# Patient Record
Sex: FEMALE | Race: WHITE | NOT HISPANIC OR LATINO | Employment: FULL TIME | URBAN - METROPOLITAN AREA
[De-identification: names, ages, dates, MRNs, and addresses within clinical notes are randomized per-mention and may not be internally consistent; named-entity substitution may affect disease eponyms.]

---

## 2017-04-10 ENCOUNTER — ALLSCRIPTS OFFICE VISIT (OUTPATIENT)
Dept: OTHER | Facility: OTHER | Age: 27
End: 2017-04-10

## 2017-09-08 ENCOUNTER — GENERIC CONVERSION - ENCOUNTER (OUTPATIENT)
Dept: OTHER | Facility: OTHER | Age: 27
End: 2017-09-08

## 2017-10-12 ENCOUNTER — ALLSCRIPTS OFFICE VISIT (OUTPATIENT)
Dept: OTHER | Facility: OTHER | Age: 27
End: 2017-10-12

## 2017-10-24 ENCOUNTER — GENERIC CONVERSION - ENCOUNTER (OUTPATIENT)
Dept: OTHER | Facility: OTHER | Age: 27
End: 2017-10-24

## 2017-10-24 ENCOUNTER — APPOINTMENT (OUTPATIENT)
Dept: RADIOLOGY | Facility: CLINIC | Age: 27
End: 2017-10-24
Payer: COMMERCIAL

## 2017-10-24 ENCOUNTER — TRANSCRIBE ORDERS (OUTPATIENT)
Dept: LAB | Facility: CLINIC | Age: 27
End: 2017-10-24

## 2017-10-24 DIAGNOSIS — S22.39XA CLOSED FRACTURE OF ONE RIB: ICD-10-CM

## 2017-10-24 PROCEDURE — 71100 X-RAY EXAM RIBS UNI 2 VIEWS: CPT

## 2017-10-29 ENCOUNTER — GENERIC CONVERSION - ENCOUNTER (OUTPATIENT)
Dept: OTHER | Facility: OTHER | Age: 27
End: 2017-10-29

## 2017-10-29 NOTE — PROGRESS NOTES
Assessment    1  Rib fractures (807 00) (S22 39XA)   2  Cough (786 2) (R05)    Plan  Rib fractures    · Betamethasone Sod Phos & Acet 6 (3-3) MG/ML Injection Suspension   · Ketorolac Tromethamine 60 MG/2ML Intramuscular Solution    Discussion/Summary    Will give toradol for rib pain  Will also give 6mg betamethasone to help stop cough and improve pain  RTO 1 month  Chief Complaint  Pt presents today for right rib pain that is not getting any beter  Pt still has cough also  History of Present Illness  HPI: Pt presents complaining of right rib pain  She suffered a fractured right 6th rib due to repeptitive coughing  She notes she was in the ER twice for the symptoms and was given tramadol, naproxen, dilaudid and tylenol #3 and desires much benefit with any of them  She continues with the coughing also  She denies fever, chills or night sweats  No hemoptysis  Review of Systems   Constitutional: as noted in HPI   ENT: no ear ache, no loss of hearing, no nosebleeds or nasal discharge, no sore throat or hoarseness  Cardiovascular: no complaints of slow or fast heart rate, no chest pain, no palpitations, no leg claudication or lower extremity edema  Respiratory: no complaints of shortness of breath, no wheezing, no dyspnea on exertion, no orthopnea or PND  Breasts: no complaints of breast pain, breast lump or nipple discharge  Gastrointestinal: no complaints of abdominal pain, no constipation, no nausea or diarrhea, no vomiting, no bloody stools  Genitourinary: no complaints of dysuria, no incontinence, no pelvic pain, no dysmenorrhea, no vaginal discharge or abnormal vaginal bleeding  Musculoskeletal: no complaints of arthralgia, no myalgia, no joint swelling or stiffness, no limb pain or swelling  Integumentary: no complaints of skin rash or lesion, no itching or dry skin, no skin wounds  Neurological: no complaints of headache, no confusion, no numbness or tingling, no dizziness or fainting  ROS reviewed  Active Problems  1  Acanthosis nigricans (701 2) (L83)   2  Anemia (285 9) (D64 9)   3  Atypical migraine (346 80) (G43 009)   4  Combined hyperlipidemia (272 2) (E78 2)   5  Cough (786 2) (R05)   6  Depression screening (V79 0) (Z13 89)   7  Elevated triglycerides with high cholesterol (272 2) (E78 2)   8  Exercise-induced asthma (493 81) (J45 990)   9  Headache (784 0) (R51)   10  Hypertension (401 9) (I10)   11  Irregular menstrual cycle (626 4) (N92 6)   12  Migraine variant, intractable (346 21) (G43 819)   13  Neuropathy (355 9) (G62 9)   14  Non-toxic multinodular goiter (241 1) (E04 2)   15  Obesity (278 00) (E66 9)   16  Obstructive sleep apnea (327 23) (G47 33)   17  Oral contraceptive prescribed (V25 01) (Z30 011)   18  Vitamin D deficiency (268 9) (E55 9)    Past Medical History  Active Problems And Past Medical History Reviewed: The active problems and past medical history were reviewed and updated today  Surgical History  Surgical History Reviewed: The surgical history was reviewed and updated today  Social History     · Being A Social Drinker   · Marital History - Single   · Never A Smoker  The social history was reviewed and updated today  The social history was reviewed and is unchanged  Family History  Family History Reviewed: The family history was reviewed and updated today  Current Meds   1  Atorvastatin Calcium 20 MG Oral Tablet; TAKE 1 TABLET DAILY; Therapy: 35Ndl2693 to (Lory Sierra)  Requested for: 06Mjz2985; Last Rx:53Sce6880 Ordered   2  Butalbital-ASA-Caff-Codeine -45-30 MG Oral Capsule; Take 2 by mouth twice a day; Therapy: 18OJW9345 to (Last Rx:11Oct2017) Ordered   3  Fenofibrate Micronized 134 MG Oral Capsule; TAKE 1 CAPSULE DAILY   ; Therapy: 63SGK3224 to (Last Rx:11Oct2017)  Requested for: 49RCC4467 Ordered   4  Ferrous Sulfate 325 (65 Fe) MG Oral Tablet; TAKE 1 TABLET DAILY AS DIRECTED;  Therapy: 94KIV1423 to (Evaluate:89Sbj4372) Recorded   5  NuvaRing 0 12-0 015 MG/24HR Vaginal Ring; INSERT 1 RING INTRAVAGINALLY FOR 3 WEEKS THEN REMOVE FOR 1 WEEK EACH MONTH ; Therapy: 33VFI9961 to (Last Rx:24Oct2016)  Requested for: 24Oct2016 Ordered   6  Propranolol HCl - 10 MG Oral Tablet; TAKE (1) TABLET THREE TIMES DAILY; Therapy: 58IUP5041 to (Last Rx:78Ndn4204)  Requested for: 55Axi1209 Ordered    The medication list was reviewed and updated today  Allergies  1  Penicillins   2  Bactrim DS TABS    Vitals   Recorded: 24UZO0846 01:24PM   Temperature 97 5 F   Heart Rate 110   Respiration 20   Systolic 526   Diastolic 82   Height 5 ft 4 in   Weight 260 lb    BMI Calculated 44 63   BSA Calculated 2 19   O2 Saturation 98       Physical Exam   Constitutional  General appearance: No acute distress, well appearing and well nourished  Pulmonary  Respiratory effort: No increased work of breathing or signs of respiratory distress  Auscultation of lungs: Clear to auscultation  Cardiovascular  Palpation of heart: Normal PMI, no thrills  Auscultation of heart: Normal rate and rhythm, normal S1 and S2, without murmurs  Examination of extremities for edema and/or varicosities: Normal    Musculoskeletal  Gait and station: Normal    Digits and nails: Normal without clubbing or cyanosis  Inspection/palpation of joints, bones, and muscles: Normal    Skin  Skin and subcutaneous tissue: Normal without rashes or lesions     Psychiatric  Orientation to person, place, and time: Normal    Mood and affect: Normal          Signatures   Electronically signed by : Roddy Moritz, PAC; Oct 13 2017  3:36PM EST                       (Author)    Electronically signed by : GINA Singleton ; Oct 13 2017  4:00PM EST                       (Author)

## 2018-01-13 NOTE — RESULT NOTES
Verified Results  (1) CBC/ PLT (NO DIFF) [id number] 08:43AM Flower Mitchell Order Number: [id number]     Test Name Result Flag Reference   HEMATOCRIT 42 9 %  34 8-46 1   HEMOGLOBIN 14 0 g/dL  11 5-15 4   MCHC 32 6 g/dL  31 4-37 4   MCH 27 8 pg  26 8-34 3   MCV 85 fL  82-98   PLATELET COUNT 288 Thousands/uL  149-390   RBC COUNT 5 04 Million/uL  3 81-5 12   RDW 14 3 %  11 6-15 1   WBC COUNT 11 14 Thousand/uL H 4 31-10 16   MPV 11 8 fL  8 9-12 7     (1) COMPREHENSIVE METABOLIC PANEL 40AET1054 33:30ZB Flower Mitchell Order Number: [id number]     Test Name Result Flag Reference   GLUCOSE,RANDM 86 mg/dL     If the patient is fasting, the ADA then defines impaired fasting glucose as > 100 mg/dL and diabetes as > or equal to 123 mg/dL  SODIUM 137 mmol/L  136-145   POTASSIUM 3 8 mmol/L  3 5-5 3   CHLORIDE 105 mmol/L  100-108   CARBON DIOXIDE 22 mmol/L  21-32   ANION GAP (CALC) 10 mmol/L  4-13   BLOOD UREA NITROGEN 13 mg/dL  5-25   CREATININE 0 77 mg/dL  0 60-1 30   Standardized to IDMS reference method   CALCIUM 9 2 mg/dL  8 3-10 1   BILI, TOTAL 0 36 mg/dL  0 20-1 00   ALK PHOSPHATAS 60 U/L     ALT (SGPT) 29 U/L  12-78   AST(SGOT) 17 U/L  5-45   ALBUMIN 3 4 g/dL L 3 5-5 0   TOTAL PROTEIN 8 0 g/dL  6 4-8 2   eGFR Non-African American      >60 0 ml/min/1 73sq m   - Patient Instructions: This is a fasting blood test  Water,black tea or black  coffee only after 9:00pm the night before test Drink 2 glasses of water the morning of test - Patient Instructions: This bloodwork is non-fasting  Please drink two glasses of   water morning of bloodwork  National Kidney Disease Education Program recommendations are as follows:  GFR calculation is accurate only with a steady state creatinine  Chronic Kidney disease less than 60 ml/min/1 73 sq  meters  Kidney failure less than 15 ml/min/1 73 sq  meters       (1) LIPID PANEL, FASTING 75BIH8035 08:43AM Elsi Beckman   TW Order Number: [id number]     Test Name Result Flag Reference   CHOLESTEROL 275 mg/dL H    HDL,DIRECT 59 mg/dL  40-60   Specimen collection should occur prior to Metamizole administration due to the potential for falsely depressed results  LDL CHOLESTEROL CALCULATED 180 mg/dL H 0-100   - Patient Instructions: This is a fasting blood test  Water,black tea or black  coffee only after 9:00pm the night before test   Drink 2 glasses of water the morning of test     - Patient Instructions: This is a fasting blood test  Water,black tea or black  coffee only after 9:00pm the night before test Drink 2 glasses of water the morning of test - Patient Instructions: This bloodwork is non-fasting  Please drink two glasses of   water morning of bloodwork  Triglyceride:         Normal              <150 mg/dl       Borderline High    150-199 mg/dl       High               200-499 mg/dl       Very High          >499 mg/dl  Cholesterol:         Desirable        <200 mg/dl      Borderline High  200-239 mg/dl      High             >239 mg/dl  HDL Cholesterol:        High    >59 mg/dL      Low     <41 mg/dL  LDL CALCULATED:    This screening LDL is a calculated result  It does not have the accuracy of the Direct Measured LDL in the monitoring of patients with hyperlipidemia and/or statin therapy  Direct Measure LDL (GYG906) must be ordered separately in these patients  TRIGLYCERIDES 182 mg/dL H <=150   Specimen collection should occur prior to N-Acetylcysteine or Metamizole administration due to the potential for falsely depressed results  (1) TSH 71YSP2492 08:43AM Essie Patricio Order Number: [id number]     Test Name Result Flag Reference   TSH 1 780 uIU/mL  0 358-3 740   - Patient Instructions: This bloodwork is non-fasting  Please drink two glasses of water morning of bloodwork  - Patient Instructions:  This is a fasting blood test  Water,black tea or black  coffee only after 9:00pm the night before test Drink 2 glasses of water the morning of test - Patient Instructions: This bloodwork is non-fasting  Please drink two glasses of   water morning of bloodwork  Patients undergoing fluorescein dye angiography may retain small amounts of fluorescein in the body for 48-72 hours post procedure  Samples containing fluorescein can produce falsely depressed TSH values  If the patient had this procedure,a specimen should be resubmitted post fluorescein clearance  The recommended reference ranges for TSH during pregnancy are as follows:  First trimester 0 1 to 2 5 uIU/mL  Second trimester  0 2 to 3 0 uIU/mL  Third trimester 0 3 to 3 0 uIU/m     (1) VITAMIN D 25-HYDROXY 52Bxz5450 08:43AM Essie Patricio Order Number: [id number]     Test Name Result Flag Reference   VIT D 25-HYDROX 27 8 ng/mL L 30 0-100 0   This assay is a certified procedure of the CDC Vitamin D Standardization Certification Program (VDSCP)     Deficiency <20ng/ml   Insufficiency 20-30ng/ml   Sufficient  ng/ml     *Patients undergoing fluorescein dye angiography may retain small amounts of fluorescein in the body for 48-72 hours post procedure  Samples containing fluorescein can produce falsely elevated Vitamin D values  If the patient had this procedure, a specimen should be resubmitted post fluorescein clearance

## 2018-01-13 NOTE — RESULT NOTES
Verified Results  (1) FACTOR V LEIDEN MUTATION DNA ANALYSIS 44QJU6740 11:11AM Anayeli Reason Order Number: [id number]     Test Name Result Flag Reference   FACTOR V LEIDEN Comment     Result:  Negative (no mutation found)  Factor V Leiden is a specific mutation (R506Q) in the factor  V gene that is associated with an increased risk of venous  thrombosis  Factor V Leiden is more resistant to  inactivation by activated protein C  As a result, factor V  persists in the circulation leading to a mild hyper-  coagulable state  The Leiden mutation accounts for 90% -  95% of APC resistance  Factor V Leiden has been reported in  patients with deep vein thrombosis, pulmonary embolus,  central retinal vein occlusion, cerebral sinus thrombosis  and hepatic vein thrombosis  Other risk factors to be  considered in the workup for venous thrombosis include the  [id number] mutation in the factor II (prothrombin) gene,  protein S and C deficiency, and antithrombin deficiencies  Anticardiolipin antibody and lupus anticoagulant analysis  may be appropriate for certain patients, as well as  homocysteine levels  Contact your local [facility] for information on how to order  additional testing if desired  FACTOR V LEIDEN INTERPRETATION Comment     **Genetic counselors are available for health care providers to**    discuss results at [phone]  Methodology:  DNA analysis of the Factor V gene was performed by allele-specific  PCR  The diagnostic sensitivity and specificity is >99% for both  Molecular-based testing is highly accurate, but as in any laboratory  test, diagnostic errors may occur  All test results must be combined  with clinical information for the most accurate interpretation  References:  Dori MARTINEZ (1996)  Clin Lab Med 88:542-323    Gianfranco Horner, PhD, Umair Hill, PhD, Nonda Dandy, PhD, GINA Waterman , PhD, Janak Guzmán, PhD, Chelsea Paul, PhD, Sean Triana PhD, [facility], [facility]     Performed at:  [facility], [place]  [id number]  : Carrillo Hitchcock MD, Phone:  [phone]

## 2018-01-14 VITALS
HEART RATE: 76 BPM | SYSTOLIC BLOOD PRESSURE: 116 MMHG | HEIGHT: 64 IN | RESPIRATION RATE: 18 BRPM | BODY MASS INDEX: 44.9 KG/M2 | TEMPERATURE: 98.1 F | DIASTOLIC BLOOD PRESSURE: 84 MMHG | WEIGHT: 263 LBS

## 2018-01-14 VITALS
WEIGHT: 260 LBS | RESPIRATION RATE: 20 BRPM | HEART RATE: 110 BPM | TEMPERATURE: 97.5 F | HEIGHT: 64 IN | BODY MASS INDEX: 44.39 KG/M2 | DIASTOLIC BLOOD PRESSURE: 82 MMHG | SYSTOLIC BLOOD PRESSURE: 126 MMHG | OXYGEN SATURATION: 98 %

## 2018-01-15 NOTE — RESULT NOTES
Verified Results  (1) PT WITH INR 16Sep2016 11:11AM Cruz Dove Order Number: [id number]     Test Name Result Flag Reference   INR 0 98  0 86-1 16   PT 13 1 seconds  12 0-14 3     (1) CBC/PLT/DIFF 16Sep2016 11:11AM Elsi Beckman     Test Name Result Flag Reference   WBC COUNT 11 97 Thousand/uL H 4 31-10 16   RBC COUNT 4 77 Million/uL  3 81-5 12   HEMOGLOBIN 13 3 g/dL  11 5-15 4   HEMATOCRIT 41 1 %  34 8-46  1   MCV 86 fL  82-98   MCH 27 9 pg  26 8-34 3   MCHC 32 4 g/dL  31 4-37 4   RDW 14 6 %  11 6-15 1   MPV 11 5 fL  8 9-12 7   PLATELET COUNT 374 Thousands/uL  149-390   nRBC AUTOMATED 0 /100 WBCs     NEUTROPHILS RELATIVE PERCENT 74 %  43-75   LYMPHOCYTES RELATIVE PERCENT 19 %  14-44   MONOCYTES RELATIVE PERCENT 6 %  4-12   EOSINOPHILS RELATIVE PERCENT 1 %  0-6   BASOPHILS RELATIVE PERCENT 0 %  0-1   NEUTROPHILS ABSOLUTE COUNT 8 88 Thousands/?L H 1 85-7 62   LYMPHOCYTES ABSOLUTE COUNT 2 26 Thousands/?L  0 60-4 47   MONOCYTES ABSOLUTE COUNT 0 67 Thousand/?L  0 17-1 22   EOSINOPHILS ABSOLUTE COUNT 0 08 Thousand/?L  0 00-0 61   BASOPHILS ABSOLUTE COUNT 0 04 Thousands/?L  0 00-0 10   - Patient Instructions: This bloodwork is non-fasting  Please drink two glasses of water morning of bloodwork  - Patient Instructions: This bloodwork is non-fasting  Please drink two glasses of water morning of bloodwork       (1) PROTEIN C ACTIVITY 16Sep2016 11:11AM Cruz Dove Order Number: [id number]     Test Name Result Flag Reference   PROTEIN C >150 % of Normal H      (1) ANTITHROMBIN III ANTIGEN [id number] 11:11AM Cruz Dove Order Number: [id number]     Test Name Result Flag Reference   ANTITHROMBIN III ANTIGEN 81 %  75 - 130   Performed at:  [address]  [id number]  : Melvin Guerrero MD, Phone:  [phone]

## 2018-01-16 NOTE — RESULT NOTES
Verified Results  XR RIBS 2 VIEW RIGHT [id number] 10:29AM Jannet Marinelli Order Number: [id number]     Test Name Result Flag Reference   XR RIBS 2 VW RIGHT (Report)     RIGHT RIBS AND CHEST     INDICATION: Right lower anterior rib pain since September, started with heavy coughing  Hospital discovered cracked ribs  COMPARISON: None     VIEWS: Frontal chest and 5 views right hemithorax     VIEWS: 6     FINDINGS:     The cardiomediastinal silhouette is unremarkable  Lungs are clear  No pleural effusions  There is no pneumothorax  Probable nondisplaced fractures of the right lateral 6th, 7th and 8th ribs  IMPRESSION:     1  No active pulmonary disease  2  Probable nondisplaced fractures of the right lateral 6th, 7th and 8th ribs         Workstation performed: [id number]     Signed by:   Claire Reyna DO   10/26/17

## 2018-01-16 NOTE — RESULT NOTES
Verified Results  (1) INSULIN, FASTING 26Oct2016 08:51AM Avery Mccormick Order Number: [id number]     Test Name Result Flag Reference   INSULIN, FASTING 30 0 mU/L H 3 0-25 0

## 2018-01-22 VITALS
HEIGHT: 64 IN | RESPIRATION RATE: 20 BRPM | SYSTOLIC BLOOD PRESSURE: 122 MMHG | DIASTOLIC BLOOD PRESSURE: 78 MMHG | TEMPERATURE: 97.4 F | OXYGEN SATURATION: 97 % | HEART RATE: 100 BPM | WEIGHT: 260 LBS | BODY MASS INDEX: 44.39 KG/M2

## 2018-01-22 VITALS
HEART RATE: 98 BPM | WEIGHT: 265.5 LBS | OXYGEN SATURATION: 98 % | RESPIRATION RATE: 20 BRPM | HEIGHT: 64 IN | DIASTOLIC BLOOD PRESSURE: 84 MMHG | SYSTOLIC BLOOD PRESSURE: 134 MMHG | TEMPERATURE: 96.3 F | BODY MASS INDEX: 45.33 KG/M2

## 2018-05-09 ENCOUNTER — TRANSITIONAL CARE MANAGEMENT (OUTPATIENT)
Dept: FAMILY MEDICINE CLINIC | Facility: CLINIC | Age: 28
End: 2018-05-09

## 2018-05-10 RX ORDER — OXYCODONE HYDROCHLORIDE 5 MG/1
5 TABLET ORAL EVERY 4 HOURS
COMMUNITY
Start: 2018-05-05 | End: 2018-06-29 | Stop reason: ALTCHOICE

## 2018-05-10 RX ORDER — NITROFURANTOIN 25; 75 MG/1; MG/1
100 CAPSULE ORAL
COMMUNITY
Start: 2018-05-05 | End: 2018-05-10

## 2018-05-10 RX ORDER — MORPHINE SULFATE 15 MG/1
15 TABLET, FILM COATED, EXTENDED RELEASE ORAL
COMMUNITY
Start: 2018-05-05 | End: 2018-06-29 | Stop reason: ALTCHOICE

## 2018-05-10 RX ORDER — ACETAMINOPHEN 500 MG
1000 TABLET ORAL
COMMUNITY
Start: 2018-05-05 | End: 2019-03-18 | Stop reason: ALTCHOICE

## 2018-05-10 RX ORDER — IBUPROFEN 600 MG/1
600 TABLET ORAL
COMMUNITY
Start: 2018-05-05 | End: 2019-03-18 | Stop reason: ALTCHOICE

## 2018-05-10 RX ORDER — BACITRACIN ZINC AND POLYMYXIN B SULFATE 500; 1000 [USP'U]/G; [USP'U]/G
OINTMENT TOPICAL
COMMUNITY
Start: 2018-05-05 | End: 2018-09-24 | Stop reason: ALTCHOICE

## 2018-05-10 RX ORDER — METHOCARBAMOL 750 MG/1
750 TABLET, FILM COATED ORAL 3 TIMES DAILY
COMMUNITY
Start: 2018-05-05 | End: 2018-06-29 | Stop reason: ALTCHOICE

## 2018-05-11 ENCOUNTER — OFFICE VISIT (OUTPATIENT)
Dept: FAMILY MEDICINE CLINIC | Facility: CLINIC | Age: 28
End: 2018-05-11
Payer: COMMERCIAL

## 2018-05-11 VITALS
HEART RATE: 100 BPM | TEMPERATURE: 98.1 F | DIASTOLIC BLOOD PRESSURE: 70 MMHG | RESPIRATION RATE: 20 BRPM | SYSTOLIC BLOOD PRESSURE: 114 MMHG

## 2018-05-11 DIAGNOSIS — S22.49XD CLOSED FRACTURE OF MULTIPLE RIBS WITH ROUTINE HEALING, UNSPECIFIED LATERALITY, SUBSEQUENT ENCOUNTER: ICD-10-CM

## 2018-05-11 DIAGNOSIS — S82.141A TIBIAL PLATEAU FRACTURE, RIGHT, CLOSED, INITIAL ENCOUNTER: Primary | ICD-10-CM

## 2018-05-11 DIAGNOSIS — S32.009D CLOSED FRACTURE OF TRANSVERSE PROCESS OF LUMBAR VERTEBRA WITH ROUTINE HEALING, SUBSEQUENT ENCOUNTER: ICD-10-CM
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
## 2018-05-11 PROBLEM — S92.402A FRACTURE OF GREAT TOE, LEFT, CLOSED: Status: ACTIVE | Noted: 2018-04-29

## 2018-05-11 PROBLEM — S60.511A: Status: ACTIVE | Noted: 2018-04-29

## 2018-05-11 PROBLEM — S00.81XA FACIAL ABRASION, INITIAL ENCOUNTER: Status: ACTIVE | Noted: 2018-04-29

## 2018-05-11 PROBLEM — S22.49XA MULTIPLE FRACTURES OF RIBS: Status: ACTIVE | Noted: 2018-04-29
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
## 2018-05-11 PROBLEM — S93.401A RIGHT ANKLE SPRAIN: Status: ACTIVE | Noted: 2018-04-29

## 2018-05-11 PROBLEM — S82.401A FRACTURE OF RIGHT FIBULA: Status: ACTIVE | Noted: 2018-04-29

## 2018-05-11 PROBLEM — S32.009A LUMBAR TRANSVERSE PROCESS FRACTURE (HCC): Status: ACTIVE | Noted: 2018-04-29

## 2018-05-11 PROCEDURE — 99496 TRANSJ CARE MGMT HIGH F2F 7D: CPT | Performed by: PHYSICIAN ASSISTANT

## 2018-05-11 NOTE — ASSESSMENT & PLAN NOTE
Patient is having routine healing of multiple orthopedic injuries    Continue current regimen of medications and continue with follow-up appointment with Ortho in 1 week
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
## 2018-06-29 ENCOUNTER — OFFICE VISIT (OUTPATIENT)
Dept: FAMILY MEDICINE CLINIC | Facility: CLINIC | Age: 28
End: 2018-06-29
Payer: COMMERCIAL

## 2018-06-29 VITALS
RESPIRATION RATE: 20 BRPM | DIASTOLIC BLOOD PRESSURE: 92 MMHG | TEMPERATURE: 97.7 F | SYSTOLIC BLOOD PRESSURE: 140 MMHG | HEART RATE: 84 BPM

## 2018-06-29 DIAGNOSIS — S82.141A TIBIAL PLATEAU FRACTURE, RIGHT, CLOSED, INITIAL ENCOUNTER: ICD-10-CM

## 2018-06-29 DIAGNOSIS — S32.009D CLOSED FRACTURE OF TRANSVERSE PROCESS OF LUMBAR VERTEBRA WITH ROUTINE HEALING, SUBSEQUENT ENCOUNTER: ICD-10-CM
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
## 2018-06-29 PROCEDURE — 99213 OFFICE O/P EST LOW 20 MIN: CPT | Performed by: PHYSICIAN ASSISTANT
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
## 2018-07-05 ENCOUNTER — TELEPHONE (OUTPATIENT)
Dept: FAMILY MEDICINE CLINIC | Facility: CLINIC | Age: 28
End: 2018-07-05

## 2018-07-05 NOTE — TELEPHONE ENCOUNTER
Pt called and she was wondering if she can use Vitamin E mineral skin oil on her knee and scar to help with healing faster and she said her skin is very tight especially around her knee      Please advise    [phone]

## 2018-07-27 ENCOUNTER — OFFICE VISIT (OUTPATIENT)
Dept: FAMILY MEDICINE CLINIC | Facility: CLINIC | Age: 28
End: 2018-07-27
Payer: COMMERCIAL

## 2018-07-27 VITALS
RESPIRATION RATE: 20 BRPM | BODY MASS INDEX: 48.49 KG/M2 | HEIGHT: 64 IN | TEMPERATURE: 99 F | DIASTOLIC BLOOD PRESSURE: 98 MMHG | SYSTOLIC BLOOD PRESSURE: 136 MMHG | WEIGHT: 284 LBS | HEART RATE: 80 BPM
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
## 2018-07-27 DIAGNOSIS — S32.009D CLOSED FRACTURE OF TRANSVERSE PROCESS OF LUMBAR VERTEBRA WITH ROUTINE HEALING, SUBSEQUENT ENCOUNTER: ICD-10-CM

## 2018-07-27 DIAGNOSIS — S82.141A TIBIAL PLATEAU FRACTURE, RIGHT, CLOSED, INITIAL ENCOUNTER: ICD-10-CM

## 2018-07-27 PROBLEM — S00.81XA FACIAL ABRASION, INITIAL ENCOUNTER: Status: RESOLVED | Noted: 2018-04-29 | Resolved: 2018-07-27

## 2018-07-27 PROBLEM — S93.401A RIGHT ANKLE SPRAIN: Status: RESOLVED | Noted: 2018-04-29 | Resolved: 2018-07-27

## 2018-07-27 PROCEDURE — 99213 OFFICE O/P EST LOW 20 MIN: CPT | Performed by: PHYSICIAN ASSISTANT
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
## 2018-08-31 ENCOUNTER — OFFICE VISIT (OUTPATIENT)
Dept: FAMILY MEDICINE CLINIC | Facility: CLINIC | Age: 28
End: 2018-08-31
Payer: COMMERCIAL

## 2018-08-31 ENCOUNTER — TRANSCRIBE ORDERS (OUTPATIENT)
Dept: LAB | Facility: CLINIC | Age: 28
End: 2018-08-31

## 2018-08-31 ENCOUNTER — APPOINTMENT (OUTPATIENT)
Dept: LAB | Facility: CLINIC | Age: 28
End: 2018-08-31
Payer: COMMERCIAL

## 2018-08-31 VITALS
HEART RATE: 80 BPM | SYSTOLIC BLOOD PRESSURE: 120 MMHG | HEIGHT: 64 IN | WEIGHT: 291 LBS | BODY MASS INDEX: 49.68 KG/M2 | RESPIRATION RATE: 20 BRPM | TEMPERATURE: 97.7 F | DIASTOLIC BLOOD PRESSURE: 96 MMHG

## 2018-08-31 DIAGNOSIS — E55.9 VITAMIN D DEFICIENCY: ICD-10-CM

## 2018-08-31 DIAGNOSIS — I10 ESSENTIAL HYPERTENSION: ICD-10-CM

## 2018-08-31 DIAGNOSIS — Z13.29 SCREENING FOR THYROID DISORDER: ICD-10-CM

## 2018-08-31 DIAGNOSIS — Z13.1 SCREENING FOR DIABETES MELLITUS: ICD-10-CM

## 2018-08-31 DIAGNOSIS — Z13.220 SCREENING FOR HYPERLIPIDEMIA: ICD-10-CM

## 2018-08-31 DIAGNOSIS — Z13.0 SCREENING FOR DEFICIENCY ANEMIA: ICD-10-CM

## 2018-08-31 DIAGNOSIS — Z00.00 WELL ADULT EXAM: Primary | ICD-10-CM

## 2018-08-31 LAB
25(OH)D3 SERPL-MCNC: 17.3 NG/ML (ref 30–100)
ALBUMIN SERPL BCP-MCNC: 3.6 G/DL (ref 3.5–5)
ALP SERPL-CCNC: 116 U/L (ref 46–116)
ALT SERPL W P-5'-P-CCNC: 38 U/L (ref 12–78)
ANION GAP SERPL CALCULATED.3IONS-SCNC: 10 MMOL/L (ref 4–13)
AST SERPL W P-5'-P-CCNC: 17 U/L (ref 5–45)
BASOPHILS # BLD AUTO: 0.05 THOUSANDS/ΜL (ref 0–0.1)
BASOPHILS NFR BLD AUTO: 1 % (ref 0–1)
BILIRUB SERPL-MCNC: 0.26 MG/DL (ref 0.2–1)
BUN SERPL-MCNC: 17 MG/DL (ref 5–25)
CALCIUM SERPL-MCNC: 9.2 MG/DL (ref 8.3–10.1)
CHLORIDE SERPL-SCNC: 103 MMOL/L (ref 100–108)
CHOLEST SERPL-MCNC: 280 MG/DL (ref 50–200)
CO2 SERPL-SCNC: 23 MMOL/L (ref 21–32)
CREAT SERPL-MCNC: 0.78 MG/DL (ref 0.6–1.3)
EOSINOPHIL # BLD AUTO: 0.1 THOUSAND/ΜL (ref 0–0.61)
EOSINOPHIL NFR BLD AUTO: 1 % (ref 0–6)
ERYTHROCYTE [DISTWIDTH] IN BLOOD BY AUTOMATED COUNT: 15.3 % (ref 11.6–15.1)
GFR SERPL CREATININE-BSD FRML MDRD: 103 ML/MIN/1.73SQ M
GLUCOSE P FAST SERPL-MCNC: 76 MG/DL (ref 65–99)
HCT VFR BLD AUTO: 44.5 % (ref 34.8–46.1)
HDLC SERPL-MCNC: 44 MG/DL (ref 40–60)
HGB BLD-MCNC: 14 G/DL (ref 11.5–15.4)
IMM GRANULOCYTES # BLD AUTO: 0.05 THOUSAND/UL (ref 0–0.2)
IMM GRANULOCYTES NFR BLD AUTO: 1 % (ref 0–2)
LDLC SERPL CALC-MCNC: 187 MG/DL (ref 0–100)
LYMPHOCYTES # BLD AUTO: 2.48 THOUSANDS/ΜL (ref 0.6–4.47)
LYMPHOCYTES NFR BLD AUTO: 24 % (ref 14–44)
MCH RBC QN AUTO: 27 PG (ref 26.8–34.3)
MCHC RBC AUTO-ENTMCNC: 31.5 G/DL (ref 31.4–37.4)
MCV RBC AUTO: 86 FL (ref 82–98)
MONOCYTES # BLD AUTO: 0.66 THOUSAND/ΜL (ref 0.17–1.22)
MONOCYTES NFR BLD AUTO: 6 % (ref 4–12)
NEUTROPHILS # BLD AUTO: 6.93 THOUSANDS/ΜL (ref 1.85–7.62)
NEUTS SEG NFR BLD AUTO: 67 % (ref 43–75)
NONHDLC SERPL-MCNC: 236 MG/DL
NRBC BLD AUTO-RTO: 0 /100 WBCS
PLATELET # BLD AUTO: 391 THOUSANDS/UL (ref 149–390)
PMV BLD AUTO: 11.1 FL (ref 8.9–12.7)
POTASSIUM SERPL-SCNC: 4 MMOL/L (ref 3.5–5.3)
PROT SERPL-MCNC: 8.3 G/DL (ref 6.4–8.2)
RBC # BLD AUTO: 5.18 MILLION/UL (ref 3.81–5.12)
SODIUM SERPL-SCNC: 136 MMOL/L (ref 136–145)
TRIGL SERPL-MCNC: 244 MG/DL
TSH SERPL DL<=0.05 MIU/L-ACNC: 1.91 UIU/ML (ref 0.36–3.74)
WBC # BLD AUTO: 10.27 THOUSAND/UL (ref 4.31–10.16)

## 2018-08-31 PROCEDURE — 99395 PREV VISIT EST AGE 18-39: CPT | Performed by: PHYSICIAN ASSISTANT

## 2018-08-31 PROCEDURE — 80053 COMPREHEN METABOLIC PANEL: CPT

## 2018-08-31 PROCEDURE — 82306 VITAMIN D 25 HYDROXY: CPT

## 2018-08-31 PROCEDURE — 85025 COMPLETE CBC W/AUTO DIFF WBC: CPT

## 2018-08-31 PROCEDURE — 80061 LIPID PANEL: CPT

## 2018-08-31 PROCEDURE — 84443 ASSAY THYROID STIM HORMONE: CPT

## 2018-08-31 PROCEDURE — 36415 COLL VENOUS BLD VENIPUNCTURE: CPT

## 2018-08-31 RX ORDER — PROPRANOLOL HYDROCHLORIDE 20 MG/1
20 TABLET ORAL EVERY 12 HOURS SCHEDULED
Qty: 30 TABLET | Refills: 2 | Status: SHIPPED | OUTPATIENT
Start: 2018-08-31 | End: 2018-09-24 | Stop reason: SDUPTHER
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
## 2018-09-05 DIAGNOSIS — E78.00 PURE HYPERCHOLESTEROLEMIA: ICD-10-CM

## 2018-09-05 DIAGNOSIS — E55.9 VITAMIN D DEFICIENCY: Primary | ICD-10-CM

## 2018-09-05 RX ORDER — ERGOCALCIFEROL 1.25 MG/1
50000 CAPSULE ORAL WEEKLY
Qty: 4 CAPSULE | Refills: 0 | Status: SHIPPED | OUTPATIENT
Start: 2018-09-05 | End: 2018-09-24 | Stop reason: SDUPTHER

## 2018-09-05 RX ORDER — ATORVASTATIN CALCIUM 20 MG/1
20 TABLET, FILM COATED ORAL DAILY
Qty: 30 TABLET | Refills: 5 | Status: SHIPPED | OUTPATIENT
Start: 2018-09-05 | End: 2019-02-25 | Stop reason: SDUPTHER

## 2018-09-10 ENCOUNTER — APPOINTMENT (OUTPATIENT)
Dept: RADIOLOGY | Facility: CLINIC | Age: 28
End: 2018-09-10
Payer: COMMERCIAL

## 2018-09-10 ENCOUNTER — OFFICE VISIT (OUTPATIENT)
Dept: URGENT CARE | Facility: CLINIC | Age: 28
End: 2018-09-10
Payer: COMMERCIAL

## 2018-09-10 VITALS
HEIGHT: 64 IN | TEMPERATURE: 98.2 F | HEART RATE: 96 BPM | DIASTOLIC BLOOD PRESSURE: 100 MMHG | BODY MASS INDEX: 49.68 KG/M2 | RESPIRATION RATE: 18 BRPM | OXYGEN SATURATION: 99 % | WEIGHT: 291 LBS | SYSTOLIC BLOOD PRESSURE: 142 MMHG

## 2018-09-10 DIAGNOSIS — S89.91XA RIGHT LEG INJURY, INITIAL ENCOUNTER: Primary | ICD-10-CM

## 2018-09-10 DIAGNOSIS — S89.91XA RIGHT LEG INJURY, INITIAL ENCOUNTER: ICD-10-CM

## 2018-09-10 PROCEDURE — 73610 X-RAY EXAM OF ANKLE: CPT

## 2018-09-10 PROCEDURE — 73590 X-RAY EXAM OF LOWER LEG: CPT

## 2018-09-10 PROCEDURE — 73564 X-RAY EXAM KNEE 4 OR MORE: CPT

## 2018-09-10 PROCEDURE — 99213 OFFICE O/P EST LOW 20 MIN: CPT | Performed by: PHYSICIAN ASSISTANT

## 2018-09-10 NOTE — PATIENT INSTRUCTIONS
Advised patient to use crutches for ambulation  She does have a  Knee immobilizer at home that she can use as well  Recommend icing and elevating the her right lower leg  Ibuprofen 600 mg every 6-8 hours  Patient has an appointment to follow up with her surgeon in 2 days  Recommend follow-up at that time
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
## 2018-09-14 ENCOUNTER — EVALUATION (OUTPATIENT)
Dept: PHYSICAL THERAPY | Facility: CLINIC | Age: 28
End: 2018-09-14
Payer: COMMERCIAL

## 2018-09-14 ENCOUNTER — TRANSCRIBE ORDERS (OUTPATIENT)
Dept: PHYSICAL THERAPY | Facility: CLINIC | Age: 28
End: 2018-09-14

## 2018-09-14 DIAGNOSIS — Z98.890 S/P ORIF (OPEN REDUCTION INTERNAL FIXATION) FRACTURE: Primary | ICD-10-CM

## 2018-09-14 DIAGNOSIS — Z87.81 S/P ORIF (OPEN REDUCTION INTERNAL FIXATION) FRACTURE: Primary | ICD-10-CM

## 2018-09-14 DIAGNOSIS — M25.561 RIGHT KNEE PAIN, UNSPECIFIED CHRONICITY: ICD-10-CM

## 2018-09-14 DIAGNOSIS — M25.561 ACUTE PAIN OF RIGHT KNEE: ICD-10-CM

## 2018-09-14 PROCEDURE — G8991 OTHER PT/OT GOAL STATUS: HCPCS | Performed by: PHYSICAL THERAPIST

## 2018-09-14 PROCEDURE — G8990 OTHER PT/OT CURRENT STATUS: HCPCS | Performed by: PHYSICAL THERAPIST

## 2018-09-14 PROCEDURE — 97162 PT EVAL MOD COMPLEX 30 MIN: CPT | Performed by: PHYSICAL THERAPIST

## 2018-09-14 PROCEDURE — 97535 SELF CARE MNGMENT TRAINING: CPT | Performed by: PHYSICAL THERAPIST
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
## 2018-09-17 ENCOUNTER — OFFICE VISIT (OUTPATIENT)
Dept: PHYSICAL THERAPY | Facility: CLINIC | Age: 28
End: 2018-09-17
Payer: COMMERCIAL

## 2018-09-17 DIAGNOSIS — M25.561 RIGHT KNEE PAIN, UNSPECIFIED CHRONICITY: ICD-10-CM

## 2018-09-17 DIAGNOSIS — Z98.890 S/P ORIF (OPEN REDUCTION INTERNAL FIXATION) FRACTURE: Primary | ICD-10-CM

## 2018-09-17 DIAGNOSIS — Z87.81 S/P ORIF (OPEN REDUCTION INTERNAL FIXATION) FRACTURE: Primary | ICD-10-CM

## 2018-09-17 PROCEDURE — 97110 THERAPEUTIC EXERCISES: CPT | Performed by: PHYSICAL THERAPIST

## 2018-09-17 PROCEDURE — 97140 MANUAL THERAPY 1/> REGIONS: CPT | Performed by: PHYSICAL THERAPIST

## 2018-09-17 NOTE — PROGRESS NOTES
Daily Note     Today's date: 2018  Patient name: Adrienne Mejia  : 1990  MRN: [id number]  Referring provider: Juanjose Allison MD  Dx:   Encounter Diagnosis     ICD-10-CM    1  S/P ORIF (open reduction internal fixation) fracture Z96 7     Z87 81    2  Right knee pain, unspecified chronicity M25 561                   Subjective: My Knee just feels stiff today  It hurts sometimes, but I cant explain it "       Objective: See treatment diary below  Precautions: S/P ORIF right tibial plateau fx  DOS 18     Daily Treatment Diary     Manual              Flexion/Extension PROM to right knee  10 min                                                                     Exercise Diary              Nu Step 10 min             Quad Set  10''10x            Hip Add with Ball 5''20x            Clamshell Heavy Green 5''20x            Bridges  5''20x            SLR 4 way  3x10 flexion and Abduction             Step Up  B 3x10             SLS             HR/TR  30x             Mini Squat             Weight Shifting on BD  Forward 60 hit, Lateral 60 hits                                                                                                                                      Modalities                                                         Assessment: Tolerated treatment well  Patient demonstrated fatigue post treatment and exhibited good technique with therapeutic exercises  RLE weakness observed during SLR exercises  Pt requires rest breaks during step ups, due to fatigue  Pt ambulates with an antalgic gait pattern  No difficulty with weight shifting activities on the [employer]        Plan: Continue per plan of care  Progress treatment as tolerated

## 2018-09-19 ENCOUNTER — OFFICE VISIT (OUTPATIENT)
Dept: PHYSICAL THERAPY | Facility: CLINIC | Age: 28
End: 2018-09-19
Payer: COMMERCIAL

## 2018-09-19 DIAGNOSIS — Z98.890 S/P ORIF (OPEN REDUCTION INTERNAL FIXATION) FRACTURE: Primary | ICD-10-CM

## 2018-09-19 DIAGNOSIS — M25.561 RIGHT KNEE PAIN, UNSPECIFIED CHRONICITY: ICD-10-CM

## 2018-09-19 DIAGNOSIS — Z87.81 S/P ORIF (OPEN REDUCTION INTERNAL FIXATION) FRACTURE: Primary | ICD-10-CM

## 2018-09-19 PROCEDURE — 97110 THERAPEUTIC EXERCISES: CPT

## 2018-09-19 PROCEDURE — 97140 MANUAL THERAPY 1/> REGIONS: CPT

## 2018-09-19 NOTE — PROGRESS NOTES
Daily Note     Today's date: 2018  Patient name: Jarvis Cartwright  : 1990  MRN: [id number]  Referring provider: Katelyn Fiore MD  Dx:   Encounter Diagnosis     ICD-10-CM    1  S/P ORIF (open reduction internal fixation) fracture Z96 7     Z87 81    2  Right knee pain, unspecified chronicity M25 561                   Subjective: "I did a lot of walking yesterday " "This leg makes everything hard " "I'm tired "      Objective: See treatment diary below  Precautions: S/P ORIF right tibial plateau fx  DOS 18     Daily Treatment Diary     Manual        Flexion/Extension PROM to right knee  10 min  10'                                          Exercise Diary        Nu Step 10 min  L3 x10'      Quad Set  10''10x 10" x10      Hip Add with Ball 5''20x 5" x20      Clamshell Heavy Green 5''20x Heavy Green 5''20x      Bridges  5''20x Pt defers      SLR 4 way  3x10 flexion and Abduction  3x10 Flex/ABD      Step Up  B 3x10  "B" 3x10      SLS        HR/TR  30x  x30      Mini Squat        Weight Shifting on BD  Forward 60 hit, Lateral 60 hits  Static, fwd/back and s/s x60 hits ea                                                                                  Modalities                                     Assessment: Tolerated treatment fair  Pt defers bridges secondary to pain  Patient demonstrated fatigue post treatment and would benefit from continued PT      Plan: Continue per plan of care  Progress treatment as tolerated

## 2018-09-21 ENCOUNTER — OFFICE VISIT (OUTPATIENT)
Dept: PHYSICAL THERAPY | Facility: CLINIC | Age: 28
End: 2018-09-21
Payer: COMMERCIAL

## 2018-09-21 DIAGNOSIS — Z98.890 S/P ORIF (OPEN REDUCTION INTERNAL FIXATION) FRACTURE: Primary | ICD-10-CM

## 2018-09-21 DIAGNOSIS — M25.561 RIGHT KNEE PAIN, UNSPECIFIED CHRONICITY: ICD-10-CM

## 2018-09-21 DIAGNOSIS — Z87.81 S/P ORIF (OPEN REDUCTION INTERNAL FIXATION) FRACTURE: Primary | ICD-10-CM

## 2018-09-21 PROCEDURE — 97140 MANUAL THERAPY 1/> REGIONS: CPT

## 2018-09-21 PROCEDURE — 97110 THERAPEUTIC EXERCISES: CPT

## 2018-09-21 RX ORDER — ERGOCALCIFEROL (VITAMIN D2) 1250 MCG
50000 CAPSULE ORAL
COMMUNITY
Start: 2018-09-05 | End: 2018-09-24 | Stop reason: SDUPTHER

## 2018-09-21 NOTE — PROGRESS NOTES
Daily Note     Today's date: 2018  Patient name: Donta Harris  : 1990  MRN: [id number]  Referring provider: Felicita Lyon MD  Dx:   Encounter Diagnosis     ICD-10-CM    1  S/P ORIF (open reduction internal fixation) fracture Z96 7     Z87 81    2  Right knee pain, unspecified chronicity M25 561                   Subjective: "i had a bad day yesterday, I'm tired " "Let's get this over with "      Objective: See treatment diary below  Precautions: S/P ORIF right tibial plateau fx  DOS 18     Daily Treatment Diary     Manual       Flexion/Extension PROM to right knee  10 min  10' 10'                                         Exercise Diary       Nu Step 10 min  L3 x10' L3 x10'     Quad Set  10''10x 10" x10 10" x10     Hip Add with Ball 5''20x 5" x20 5" x20     Clamshell Heavy Green 5''20x Heavy Green 5''20x Heavy Green 5" x20     Bridges  5''20x Pt defers Pt defers      SLR 4 way  3x10 flexion and Abduction  3x10 Flex/ABD 3x10 Flex and ABD     Step Up  B 3x10  "B" 3x10 "B" 3x10     SLS        HR/TR  30x  x30 x30     Mini Squat        Weight Shifting on BD  Forward 60 hit, Lateral 60 hits  Static, fwd/back and s/s x60 hits ea Static, fwd/back and s/s x60 hits ea                                                                                 Modalities                                     Assessment: Tolerated treatment well  Patient demonstrated fatigue post treatment, exhibited good technique with therapeutic exercises and would benefit from continued PT      Plan: Continue per plan of care  Progress treatment as tolerated

## 2018-09-24 ENCOUNTER — OFFICE VISIT (OUTPATIENT)
Dept: PHYSICAL THERAPY | Facility: CLINIC | Age: 28
End: 2018-09-24
Payer: COMMERCIAL

## 2018-09-24 ENCOUNTER — OFFICE VISIT (OUTPATIENT)
Dept: FAMILY MEDICINE CLINIC | Facility: CLINIC | Age: 28
End: 2018-09-24
Payer: COMMERCIAL

## 2018-09-24 VITALS
WEIGHT: 292 LBS | HEIGHT: 64 IN | TEMPERATURE: 95.7 F | DIASTOLIC BLOOD PRESSURE: 84 MMHG | RESPIRATION RATE: 20 BRPM | SYSTOLIC BLOOD PRESSURE: 120 MMHG | HEART RATE: 68 BPM | BODY MASS INDEX: 49.85 KG/M2

## 2018-09-24 DIAGNOSIS — I10 ESSENTIAL HYPERTENSION: ICD-10-CM

## 2018-09-24 DIAGNOSIS — Z98.890 S/P ORIF (OPEN REDUCTION INTERNAL FIXATION) FRACTURE: Primary | ICD-10-CM

## 2018-09-24 DIAGNOSIS — Z01.419 WOMEN'S ANNUAL ROUTINE GYNECOLOGICAL EXAMINATION: Primary | ICD-10-CM

## 2018-09-24 DIAGNOSIS — Z87.81 S/P ORIF (OPEN REDUCTION INTERNAL FIXATION) FRACTURE: Primary | ICD-10-CM

## 2018-09-24 DIAGNOSIS — E55.9 VITAMIN D DEFICIENCY: ICD-10-CM

## 2018-09-24 DIAGNOSIS — M25.561 RIGHT KNEE PAIN, UNSPECIFIED CHRONICITY: ICD-10-CM

## 2018-09-24 PROCEDURE — 99213 OFFICE O/P EST LOW 20 MIN: CPT | Performed by: PHYSICIAN ASSISTANT

## 2018-09-24 PROCEDURE — 97140 MANUAL THERAPY 1/> REGIONS: CPT | Performed by: PHYSICAL THERAPIST

## 2018-09-24 PROCEDURE — 3008F BODY MASS INDEX DOCD: CPT | Performed by: PHYSICIAN ASSISTANT

## 2018-09-24 PROCEDURE — 97110 THERAPEUTIC EXERCISES: CPT | Performed by: PHYSICAL THERAPIST

## 2018-09-24 PROCEDURE — G0145 SCR C/V CYTO,THINLAYER,RESCR: HCPCS | Performed by: PHYSICIAN ASSISTANT

## 2018-09-24 PROCEDURE — 3074F SYST BP LT 130 MM HG: CPT | Performed by: PHYSICIAN ASSISTANT

## 2018-09-24 PROCEDURE — 3079F DIAST BP 80-89 MM HG: CPT | Performed by: PHYSICIAN ASSISTANT

## 2018-09-24 PROCEDURE — 1036F TOBACCO NON-USER: CPT | Performed by: PHYSICIAN ASSISTANT

## 2018-09-24 RX ORDER — ETONOGESTREL AND ETHINYL ESTRADIOL 11.7; 2.7 MG/1; MG/1
INSERT, EXTENDED RELEASE VAGINAL
Qty: 1 EACH | Refills: 6 | Status: SHIPPED | OUTPATIENT
Start: 2018-09-24 | End: 2019-03-18 | Stop reason: SDDI

## 2018-09-24 RX ORDER — PROPRANOLOL HYDROCHLORIDE 20 MG/1
20 TABLET ORAL EVERY 12 HOURS SCHEDULED
Qty: 60 TABLET | Refills: 4 | Status: SHIPPED | OUTPATIENT
Start: 2018-09-24 | End: 2019-02-25 | Stop reason: SDUPTHER

## 2018-09-24 RX ORDER — ERGOCALCIFEROL 1.25 MG/1
50000 CAPSULE ORAL WEEKLY
Qty: 4 CAPSULE | Refills: 4 | Status: SHIPPED | OUTPATIENT
Start: 2018-09-24 | End: 2019-02-25 | Stop reason: SDUPTHER
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
## 2018-09-24 NOTE — PROGRESS NOTES
Daily Note     Today's date: 2018  Patient name: Roula Baron  : 1990  MRN: [id number]  Referring provider: Kishore Andrea MD  Dx:   Encounter Diagnosis     ICD-10-CM    1  S/P ORIF (open reduction internal fixation) fracture Z96 7     Z87 81    2  Right knee pain, unspecified chronicity M25 561                   Subjective: " My knee hurts today,  I am soaking it in the tub at home "       Objective: See treatment diary below  Manual      Flexion/Extension PROM to right knee  10 min  10' 10' 10'                                         Exercise Diary      Nu Step 10 min  L3 x10' L3 x10' L3 x10'     Quad Set  10''10x 10" x10 10" x10     Hip Add with Ball 5''20x 5" x20 5" x20 5''20x    Clamshell Heavy Green 5''20x Heavy Green 5''20x Heavy Green 5" x20 Heavy Green 5" x20    Bridges  5''20x Pt defers Pt defers      SLR 4 way  3x10 flexion and Abduction  3x10 Flex/ABD 3x10 Flex and ABD 3x10 Flex and ABD    Step Up  B 3x10  "B" 3x10 "B" 3x10 B 3x10     SLS    3x20'' each leg     HR/TR  30x  x30 x30 x30    Mini Squat    10x Biodex, Cues     Weight Shifting on BD  Forward 60 hit, Lateral 60 hits  Static, fwd/back and s/s x60 hits ea Static, fwd/back and s/s x60 hits ea                                                                                 Modalities                                       Assessment: Tolerated treatment well  Patient exhibited good technique with therapeutic exercises  Added Mini squats on the biodex, and SLS balance this session, to help with closed chain strength and stability in right knee  Mild buckling episodes during SLS balance  Plan: Continue per plan of care

## 2018-09-26 ENCOUNTER — OFFICE VISIT (OUTPATIENT)
Dept: PHYSICAL THERAPY | Facility: CLINIC | Age: 28
End: 2018-09-26
Payer: COMMERCIAL

## 2018-09-26 DIAGNOSIS — Z87.81 S/P ORIF (OPEN REDUCTION INTERNAL FIXATION) FRACTURE: Primary | ICD-10-CM

## 2018-09-26 DIAGNOSIS — Z98.890 S/P ORIF (OPEN REDUCTION INTERNAL FIXATION) FRACTURE: Primary | ICD-10-CM

## 2018-09-26 DIAGNOSIS — M25.561 RIGHT KNEE PAIN, UNSPECIFIED CHRONICITY: ICD-10-CM

## 2018-09-26 PROCEDURE — 97140 MANUAL THERAPY 1/> REGIONS: CPT | Performed by: PHYSICAL THERAPIST

## 2018-09-26 PROCEDURE — 97110 THERAPEUTIC EXERCISES: CPT | Performed by: PHYSICAL THERAPIST

## 2018-09-26 NOTE — PROGRESS NOTES
Daily Note     Today's date: 2018  Patient name: Kina Darnell  : 1990  MRN: [id number]  Referring provider: Tomás Shepard MD  Dx:   Encounter Diagnosis     ICD-10-CM    1  S/P ORIF (open reduction internal fixation) fracture Z96 7     Z87 81    2  Right knee pain, unspecified chronicity M25 561        Start Time: 0900  Stop Time: 1000  Total time in clinic (min): 60 minutes    Subjective: Patient states 3-4/10 pain level coming into clinic today  Objective: See treatment diary below  Manual     Flexion/Extension PROM to right knee  10 min  10' 10' 10'   10'                                                                 Exercise Diary     Nu Step 10 min  L3 x10' L3 x10' L3 x10'   L3 10'   Quad Set  10''10x 10" x10 10" x10    10"x10   Hip Add with Ball 5''20x 5" x20 5" x20 5''20x  5"x20   Clamshell Heavy Green 5''20x Heavy Green 5''20x Heavy Green 5" x20 Heavy Green 5" x20 Heavy grn 5"x20   Bridges  5''20x Pt defers Pt defers        SLR 4 way  3x10 flexion and Abduction  3x10 Flex/ABD 3x10 Flex and ABD 3x10 Flex and ABD  1# 2x10 flex and abd   Step Up  B 3x10  "B" 3x10 "B" 3x10 B 3x10   B 3x10   SLS       3x20'' each leg   3x20"   HR/TR  30x  x30 x30 x30  x30   Mini Squat       10x Biodex, Cues   10x biodex, cues   Weight Shifting on BD  Forward 60 hit, Lateral 60 hits  Static, fwd/back and s/s x60 hits ea Static, fwd/back and s/s x60 hits ea        standing hamstring curl         10x                                                                                                                          Modalities                                                              Assessment: Patient displays good scar mobility  Limited knee flexion strength impacting pain mechanics with decreased knee flexion in swing; keeps knee in extension causing hip hike and circumduction  Plan: Continue per plan of care  Progress treatment as tolerated

## 2018-09-28 ENCOUNTER — OFFICE VISIT (OUTPATIENT)
Dept: PHYSICAL THERAPY | Facility: CLINIC | Age: 28
End: 2018-09-28
Payer: COMMERCIAL

## 2018-09-28 DIAGNOSIS — Z87.81 S/P ORIF (OPEN REDUCTION INTERNAL FIXATION) FRACTURE: Primary | ICD-10-CM

## 2018-09-28 DIAGNOSIS — Z98.890 S/P ORIF (OPEN REDUCTION INTERNAL FIXATION) FRACTURE: Primary | ICD-10-CM

## 2018-09-28 DIAGNOSIS — M25.561 RIGHT KNEE PAIN, UNSPECIFIED CHRONICITY: ICD-10-CM

## 2018-09-28 LAB
LAB AP GYN PRIMARY INTERPRETATION: NORMAL
Lab: NORMAL

## 2018-09-28 PROCEDURE — 97110 THERAPEUTIC EXERCISES: CPT | Performed by: PHYSICAL THERAPIST

## 2018-09-28 PROCEDURE — 97140 MANUAL THERAPY 1/> REGIONS: CPT | Performed by: PHYSICAL THERAPIST

## 2018-09-28 NOTE — PROGRESS NOTES
Daily Note     Today's date: 2018  Patient name: Clarence Dawson  : 1990  MRN: [id number]  Referring provider: Jose Maria Desai MD  Dx:   Encounter Diagnosis     ICD-10-CM    1  S/P ORIF (open reduction internal fixation) fracture Z96 7     Z87 81    2  Right knee pain, unspecified chronicity M25 561                   Subjective: " Im frustrated with myself, that I cant get my knee straight "        Objective: See treatment diary below  Manual     Flexion/Extension PROM to right knee  10 min  10' 10' 10'   10'                                                                 Exercise Diary     Nu Step 10 min  L3 x10' L3 x10' L3 x10'   L3 10'   Quad Set  10''10x 10" x10 10" x10    10"x10   Hip Add with Ball 5''20x 5" x20 5" x20 5''20x  5"x20   Clamshell Heavy Green 5''20x Heavy Green 5''20x Heavy Green 5" x20 Heavy Green 5" x20 Heavy grn 5"x20   Bridges  5''20x Pt defers Pt defers        SLR 4 way  3x10 flexion and Abduction  3x10 Flex/ABD 3x10 Flex and ABD 3x10 Flex and ABD  1# 2x10 flex and abd   Step Up  B 3x10  "B" 3x10 "B" 3x10 B 3x10   B 3x10   SLS  3x20'' each leg      3x20'' each leg   3x20"   HR/TR  30x  x30 x30 x30  x30   Mini Squat  2x10 Biodex      10x Biodex, Cues   10x biodex, cues   Weight Shifting on BD   Static, fwd/back and s/s x60 hits ea Static, fwd/back and s/s x60 hits ea        standing hamstring curl         10x                                                                                                                          Modalities                                                              Assessment: Tolerated treatment well  Patient exhibited good technique with therapeutic exercises  Pt continues to ambulate with antalgic gait and no AD  Lacks TKE on her right lower extremity  Plan: Continue per plan of care

## 2018-10-01 ENCOUNTER — OFFICE VISIT (OUTPATIENT)
Dept: PHYSICAL THERAPY | Facility: CLINIC | Age: 28
End: 2018-10-01
Payer: COMMERCIAL

## 2018-10-01 DIAGNOSIS — Z87.81 S/P ORIF (OPEN REDUCTION INTERNAL FIXATION) FRACTURE: Primary | ICD-10-CM

## 2018-10-01 DIAGNOSIS — M25.561 RIGHT KNEE PAIN, UNSPECIFIED CHRONICITY: ICD-10-CM

## 2018-10-01 DIAGNOSIS — Z98.890 S/P ORIF (OPEN REDUCTION INTERNAL FIXATION) FRACTURE: Primary | ICD-10-CM

## 2018-10-01 PROCEDURE — 97140 MANUAL THERAPY 1/> REGIONS: CPT

## 2018-10-01 PROCEDURE — 97110 THERAPEUTIC EXERCISES: CPT

## 2018-10-01 NOTE — PROGRESS NOTES
Daily Note     Today's date: 10/1/2018  Patient name: Gallo Pruitt  : 1990  MRN: [id number]  Referring provider: Pranav Frey MD  Dx: No diagnosis found  Subjective: Pt  Reports she went to work for four hours for the first time this weekend  Pt states," I am sore, but no pain "      Objective: See treatment diary below  Manual  9/28 10/1 9/21 9/24  9/26   Flexion/Extension PROM to right knee  10 min  10' 10' 10'   10'                                                                 Exercise Diary  9/28 10/1 9/21 9/24  9/26   Nu Step 10 min  L3 x10' L3 x10' L3 x10'   L3 10'   Quad Set  10''10x 10" x10 10" x10    10"x10   Hip Add with Ball 5''20x 5" x20 5" x20 5''20x  5"x20   Clamshell Heavy Green 5''20x Heavy Green 5''20x Heavy Green 5" x20 Heavy Green 5" x20 Heavy grn 5"x20   Bridges  5''20x Pt defers Pt defers        SLR 4 way  3x10 flexion and Abduction  3x10 Flex/ABD 1# 3x10 Flex and ABD 3x10 Flex and ABD  1# 2x10 flex and abd   Step Up  B 3x10  "B" 3x10 "B" 3x10 B 3x10   B 3x10   SLS  3x20'' each leg   3x30" b/l   3x20'' each leg   3x20"   HR/TR  30x  x30 x30 x30  x30   Mini Squat  2x10 Biodex   2x10 Biodex   10x Biodex, Cues   10x biodex, cues   Weight Shifting on BD   Static, fwd/back and s/s x60 hits ea Static, fwd/back and s/s x60 hits ea        standing hamstring curl         10x         TKE Blue TB NV                                                                                                                 Modalities                                                                Assessment: Tolerated treatment well  Patient demonstrated fatigue post treatment, exhibited good technique with therapeutic exercises and would benefit from continued PT  Pt  lacks terminal knee extension  Will add TKE with blue band NV  Plan: Progress treatment as tolerated

## 2018-10-03 ENCOUNTER — OFFICE VISIT (OUTPATIENT)
Dept: PHYSICAL THERAPY | Facility: CLINIC | Age: 28
End: 2018-10-03
Payer: COMMERCIAL

## 2018-10-03 DIAGNOSIS — M25.561 RIGHT KNEE PAIN, UNSPECIFIED CHRONICITY: ICD-10-CM

## 2018-10-03 DIAGNOSIS — Z87.81 S/P ORIF (OPEN REDUCTION INTERNAL FIXATION) FRACTURE: Primary | ICD-10-CM

## 2018-10-03 DIAGNOSIS — Z98.890 S/P ORIF (OPEN REDUCTION INTERNAL FIXATION) FRACTURE: Primary | ICD-10-CM

## 2018-10-03 PROCEDURE — 97110 THERAPEUTIC EXERCISES: CPT | Performed by: PHYSICAL THERAPIST

## 2018-10-03 PROCEDURE — 97140 MANUAL THERAPY 1/> REGIONS: CPT | Performed by: PHYSICAL THERAPIST

## 2018-10-03 NOTE — PROGRESS NOTES
Daily Note     Today's date: 10/3/2018  Patient name: Mary Jane Fabian  : 1990  MRN: [id number]  Referring provider: Adrian Garnett MD  Dx:   Encounter Diagnosis     ICD-10-CM    1  S/P ORIF (open reduction internal fixation) fracture Z96 7     Z87 81    2  Right knee pain, unspecified chronicity M25 561        Start Time: 0800  Stop Time: 0900  Total time in clinic (min): 60 minutes    Subjective: Pt reports she is a little sore today and states she worked yesterday which has made her even more sore  Notes she continues to have some pain on the anterior aspect of the knee  Objective: See treatment diary below  Manual  9/28 10/1 10/3 9/24  9/26   Flexion/Extension PROM to right knee  10 min  10' 10 min 10'   10'                                                                 Exercise Diary  9/28 10/1 10/3 9/24  9/26   Nu Step 10 min  L3 x10' L6 10 min L3 x10'   L3 10'   Quad Set  10''10x 10" x10 10x 10"    10"x10   Hip Add with Ball 5''20x 5" x20 5" 20x 5''20x  5"x20   Clamshell Heavy Green 5''20x Heavy Green 5''20x Heavy Green  20x 5" Heavy Green 5" x20 Heavy grn 5"x20   Bridges  5''20x Pt defers Defers        SLR 4 way  3x10 flexion and Abduction  3x10 Flex/ABD 1# 3x10  Flex/Abd  1# 3x10 Flex and ABD  1# 2x10 flex and abd   Step Up  B 3x10  "B" 3x10 "B" 3x10 B 3x10   B 3x10   SLS  3x20'' each leg   3x30" b/l 3x30" B/L 3x20'' each leg   3x20"   HR/TR  30x  x30 30x x30  x30   Mini Squat  2x10 Biodex   2x10 Biodex 2x10 Biodex 10x Biodex, Cues   10x biodex, cues   Weight Shifting on BD    Static, fwd/back and s/s x60 hits ea Statis  Fwd/Back, S/S  60x ea        standing hamstring curl        10x         TKE Blue TB NV 2x10 5" hold  Blue                                                                                                               Modalities                                                              Assessment: Tolerated treatment well   Patient exhibited good technique with therapeutic exercises and would benefit from continued PT  Pt with good tolerance to exercises this visit  Required occasional cuing for proper exercise technique and rest break t/o treatment 2* fatigue and subjective report of increased soreness  Added standing TKE with blue TB  Reported difficulty, but was able to complete with good form  Plan: Continue per plan of care  Progress treatment as tolerated

## 2018-10-04 ENCOUNTER — OFFICE VISIT (OUTPATIENT)
Dept: PHYSICAL THERAPY | Facility: CLINIC | Age: 28
End: 2018-10-04
Payer: COMMERCIAL

## 2018-10-04 DIAGNOSIS — Z98.890 S/P ORIF (OPEN REDUCTION INTERNAL FIXATION) FRACTURE: Primary | ICD-10-CM

## 2018-10-04 DIAGNOSIS — M25.561 RIGHT KNEE PAIN, UNSPECIFIED CHRONICITY: ICD-10-CM

## 2018-10-04 DIAGNOSIS — Z87.81 S/P ORIF (OPEN REDUCTION INTERNAL FIXATION) FRACTURE: Primary | ICD-10-CM

## 2018-10-04 PROCEDURE — 97140 MANUAL THERAPY 1/> REGIONS: CPT | Performed by: PHYSICAL THERAPIST

## 2018-10-04 PROCEDURE — 97110 THERAPEUTIC EXERCISES: CPT | Performed by: PHYSICAL THERAPIST

## 2018-10-04 NOTE — PROGRESS NOTES
Daily Note     Today's date: 10/4/2018  Patient name: Ene Gunter  : 1990  MRN: [id number]  Referring provider: Min Munguia MD  Dx:   Encounter Diagnosis     ICD-10-CM    1  S/P ORIF (open reduction internal fixation) fracture Z96 7     Z87 81    2  Right knee pain, unspecified chronicity M25 561        Start Time: 0900  Stop Time: 1000  Total time in clinic (min): 60 minutes    Subjective: Patient states she started working again a few days ago which has aggravated knee  Objective: See treatment diary below  Manual  9/28 10/1 10/3 10/4  9/26   Flexion/Extension PROM to right knee  10 min  10' 10 min 10'   10'                                                                 Exercise Diary  9/28 10/1 10/3 10/4  9/26   Nu Step 10 min  L3 x10' L6 10 min L3 x10'   L3 10'   Quad Set  10''10x 10" x10 10x 10"  10x10"  10"x10   Hip Add with Ball 5''20x 5" x20 5" 20x 5''20x  5"x20   Clamshell Heavy Green 5''20x Heavy Green 5''20x Heavy Green  20x 5" Heavy green 20x 5" Heavy grn 5"x20   Bridges  5''20x Pt defers Defers  defer     SLR 4 way  3x10 flexion and Abduction  3x10 Flex/ABD 1# 3x10  Flex/Abd  1# 3x10 flex/abd 1#  1# 2x10 flex and abd   Step Up  B 3x10  "B" 3x10 "B" 3x10 "B" 3x10  B 3x10   SLS  3x20'' each leg   3x30" b/l 3x30" B/L 3x30" bilat  3x20"   HR/TR  30x  x30 30x 30x  x30   Mini Squat  2x10 Biodex   2x10 Biodex 2x10 Biodex 2x10 biodex  10x biodex, cues   Weight Shifting on BD    Static, fwd/back and s/s x60 hits ea Statis  Fwd/Back, S/S  60x ea Statis fwd/back, S/S 60x ea      standing hamstring curl        10x         TKE Blue TB NV 2x10 5" hold  Blue 2x10 blue                                                                                                             Modalities                                                              Assessment: Difficulty with WB TKE causing pain at medial portion of patella possibly contributing to gait dysfunction        Plan: Continue per plan of care   Progress treatment as tolerated

## 2018-10-05 ENCOUNTER — APPOINTMENT (OUTPATIENT)
Dept: PHYSICAL THERAPY | Facility: CLINIC | Age: 28
End: 2018-10-05
Payer: COMMERCIAL

## 2018-10-09 ENCOUNTER — OFFICE VISIT (OUTPATIENT)
Dept: PHYSICAL THERAPY | Facility: CLINIC | Age: 28
End: 2018-10-09
Payer: COMMERCIAL

## 2018-10-09 DIAGNOSIS — M25.561 RIGHT KNEE PAIN, UNSPECIFIED CHRONICITY: ICD-10-CM

## 2018-10-09 DIAGNOSIS — Z98.890 S/P ORIF (OPEN REDUCTION INTERNAL FIXATION) FRACTURE: Primary | ICD-10-CM

## 2018-10-09 DIAGNOSIS — Z87.81 S/P ORIF (OPEN REDUCTION INTERNAL FIXATION) FRACTURE: Primary | ICD-10-CM

## 2018-10-09 PROCEDURE — 97140 MANUAL THERAPY 1/> REGIONS: CPT | Performed by: PHYSICAL THERAPIST

## 2018-10-09 PROCEDURE — 97110 THERAPEUTIC EXERCISES: CPT | Performed by: PHYSICAL THERAPIST
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
## 2018-10-10 ENCOUNTER — OFFICE VISIT (OUTPATIENT)
Dept: PHYSICAL THERAPY | Facility: CLINIC | Age: 28
End: 2018-10-10
Payer: COMMERCIAL

## 2018-10-10 DIAGNOSIS — Z87.81 S/P ORIF (OPEN REDUCTION INTERNAL FIXATION) FRACTURE: Primary | ICD-10-CM

## 2018-10-10 DIAGNOSIS — M25.561 RIGHT KNEE PAIN, UNSPECIFIED CHRONICITY: ICD-10-CM

## 2018-10-10 DIAGNOSIS — Z98.890 S/P ORIF (OPEN REDUCTION INTERNAL FIXATION) FRACTURE: Primary | ICD-10-CM

## 2018-10-10 PROCEDURE — 97110 THERAPEUTIC EXERCISES: CPT

## 2018-10-10 PROCEDURE — 97140 MANUAL THERAPY 1/> REGIONS: CPT

## 2018-10-10 NOTE — PROGRESS NOTES
Daily Note     Today's date: 10/10/2018  Patient name: Daria Slater  : 1990  MRN: [id number]  Referring provider: Mo Agee MD  Dx:   Encounter Diagnosis     ICD-10-CM    1  S/P ORIF (open reduction internal fixation) fracture Z96 7     Z87 81    2  Right knee pain, unspecified chronicity M25 561                   Subjective: Pt reports that she "is tired today " Pt reports she is working 6 hrs after she leaves PT  Objective: See treatment diary below  Manual  10/10 10/1 10/3 10/4  10/9   Flexion/Extension PROM to right knee  10 min  10' 10 min 10'   10'                                                                 Exercise Diary  10/10 10/1 10/3 10/4  10/9   Nu Step L6 x10 min  L3 x10' L6 10 min L3 x10'   L3 10'   Quad Set  10''10x 10" x10 10x 10"  10x10"  10"x10   Hip Add with Ball 5''20x 5" x20 5" 20x 5''20x  5"x20   Clamshell Heavy Green 5''20x Heavy Green 5''20x Heavy Green  20x 5" Heavy green 20x 5" Heavy grn 5"x20   Bridges   Pt defers Defers  defer     SLR 4 way  3x10 flex/abd 1 5#  3x10 Flex/ABD 1# 3x10  Flex/Abd  1# 3x10 flex/abd 1#  1# 2x10 flex and abd   Step Up  B 3x10  "B" 3x10 "B" 3x10 "B" 3x10  patient deferred   SLS  3x30" bilat  3x30" b/l 3x30" B/L 3x30" bilat  3x20"   HR/TR  30x  x30 30x 30x  x30   Mini Squat  2x10 Biodex   2x10 Biodex 2x10 Biodex 2x10 biodex  10x biodex, cues   Weight Shifting on BD   Statis fwd/back, S/S 60x ea Static, fwd/back and s/s x60 hits ea Statis  Fwd/Back, S/S  60x ea Statis fwd/back, S/S 60x ea Statis fwd/back, S/S 60x ea    standing hamstring curl  x20       10x    TKE   2x10 blue  TKE Blue TB NV 2x10 5" hold  Blue 2x10 blue  2x10 blue                                                                                                           Modalities                                                            Assessment: Tolerated treatment well  Increased bonilla during SLR this date  PROM of the R knee continues to slightly increase   Patient demonstrated fatigue post treatment, exhibited good technique with therapeutic exercises and would benefit from continued PT      Plan: Continue per plan of care  Progress treatment as tolerated

## 2018-10-11 ENCOUNTER — EVALUATION (OUTPATIENT)
Dept: PHYSICAL THERAPY | Facility: CLINIC | Age: 28
End: 2018-10-11
Payer: COMMERCIAL

## 2018-10-11 DIAGNOSIS — Z98.890 S/P ORIF (OPEN REDUCTION INTERNAL FIXATION) FRACTURE: Primary | ICD-10-CM

## 2018-10-11 DIAGNOSIS — Z87.81 S/P ORIF (OPEN REDUCTION INTERNAL FIXATION) FRACTURE: Primary | ICD-10-CM

## 2018-10-11 DIAGNOSIS — M25.561 RIGHT KNEE PAIN, UNSPECIFIED CHRONICITY: ICD-10-CM

## 2018-10-11 PROCEDURE — G8990 OTHER PT/OT CURRENT STATUS: HCPCS | Performed by: PHYSICAL THERAPIST

## 2018-10-11 PROCEDURE — G8991 OTHER PT/OT GOAL STATUS: HCPCS | Performed by: PHYSICAL THERAPIST

## 2018-10-11 PROCEDURE — 97164 PT RE-EVAL EST PLAN CARE: CPT | Performed by: PHYSICAL THERAPIST

## 2018-10-11 PROCEDURE — 97110 THERAPEUTIC EXERCISES: CPT | Performed by: PHYSICAL THERAPIST

## 2018-10-11 PROCEDURE — 97140 MANUAL THERAPY 1/> REGIONS: CPT | Performed by: PHYSICAL THERAPIST
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
## 2018-10-12 ENCOUNTER — APPOINTMENT (OUTPATIENT)
Dept: PHYSICAL THERAPY | Facility: CLINIC | Age: 28
End: 2018-10-12
Payer: COMMERCIAL

## 2018-10-15 ENCOUNTER — OFFICE VISIT (OUTPATIENT)
Dept: PHYSICAL THERAPY | Facility: CLINIC | Age: 28
End: 2018-10-15
Payer: COMMERCIAL

## 2018-10-15 DIAGNOSIS — Z87.81 S/P ORIF (OPEN REDUCTION INTERNAL FIXATION) FRACTURE: Primary | ICD-10-CM

## 2018-10-15 DIAGNOSIS — M25.561 RIGHT KNEE PAIN, UNSPECIFIED CHRONICITY: ICD-10-CM

## 2018-10-15 DIAGNOSIS — Z98.890 S/P ORIF (OPEN REDUCTION INTERNAL FIXATION) FRACTURE: Primary | ICD-10-CM

## 2018-10-15 PROCEDURE — 97110 THERAPEUTIC EXERCISES: CPT

## 2018-10-15 PROCEDURE — 97140 MANUAL THERAPY 1/> REGIONS: CPT

## 2018-10-15 NOTE — PROGRESS NOTES
Daily Note     Today's date: 10/15/2018  Patient name: Halina Sepulveda  : 1990  MRN: [id number]  Referring provider: Gregg Gilbert MD  Dx: No diagnosis found  Subjective: Pt  reports she worked 4 days in a row, so she is sore in the front of her knee  Objective: See treatment diary below    Manual  10/10 10/11 10/15 10/4  10/9   Flexion/Extension PROM to right knee  10 min  10' 10 min 10'   10'                                                                 Exercise Diary  10/10 10/11 10/15 10/4  10/9   Nu Step L6 x10 min  L3 x10' L6 10 min L3 x10'   L3 10'   Quad Set  10''10x 10" x10 10x 10"  10x10"  10"x10   Hip Add with Ball 5''20x 5" x20 5" 20x 5''20x  5"x20   Clamshell Heavy Green 5''20x Heavy Green 5''20x Heavy Green  20x 5" Heavy green 20x 5" Heavy grn 5"x20   Bridges   Pt defers Defers  defer     SLR 4 way  3x10 flex/abd 1 5#  3x10 Flex/ABD 1# 3x10  Flex/Abd  1 5 # 3x10 flex/abd 1#  1# 2x10 flex and abd   Step Up  B 3x10  "B" 3x10 "B" 3x10 "B" 3x10  patient deferred   SLS  3x30" bilat  3x30" b/l 3x30" B/L 3x30" bilat  3x20"   HR/TR  30x  x30 30x 30x  x30   Mini Squat  2x10 Biodex   2x10 Biodex 2x10 Biodex 2x10 biodex  10x biodex, cues   Weight Shifting on BD   Statis fwd/back, S/S 60x ea Static, fwd/back and s/s x60 hits ea Static  Fwd/Back, S/S  60x ea Statis fwd/back, S/S 60x ea Statis fwd/back, S/S 60x ea    standing hamstring curl  x20       10x    TKE   2x10 blue  TKE Blue TB NV 2x10 5" hold  Blue 2x10 blue  2x10 blue                                                                                                           Modalities                                                              Assessment: Tolerated treatment well  Patient demonstrated fatigue post treatment, exhibited good technique with therapeutic exercises and would benefit from continued PT  Noted increased strength and ROM  Plan: Progress treatment as tolerated

## 2018-10-17 ENCOUNTER — APPOINTMENT (OUTPATIENT)
Dept: PHYSICAL THERAPY | Facility: CLINIC | Age: 28
End: 2018-10-17
Payer: COMMERCIAL

## 2018-10-19 ENCOUNTER — OFFICE VISIT (OUTPATIENT)
Dept: PHYSICAL THERAPY | Facility: CLINIC | Age: 28
End: 2018-10-19
Payer: COMMERCIAL

## 2018-10-19 DIAGNOSIS — M25.561 RIGHT KNEE PAIN, UNSPECIFIED CHRONICITY: ICD-10-CM

## 2018-10-19 DIAGNOSIS — Z87.81 S/P ORIF (OPEN REDUCTION INTERNAL FIXATION) FRACTURE: Primary | ICD-10-CM

## 2018-10-19 DIAGNOSIS — Z98.890 S/P ORIF (OPEN REDUCTION INTERNAL FIXATION) FRACTURE: Primary | ICD-10-CM

## 2018-10-19 PROCEDURE — 97110 THERAPEUTIC EXERCISES: CPT | Performed by: PHYSICAL THERAPIST

## 2018-10-19 PROCEDURE — 97140 MANUAL THERAPY 1/> REGIONS: CPT | Performed by: PHYSICAL THERAPIST

## 2018-10-19 NOTE — PROGRESS NOTES
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 "       Objective: See treatment diary below    Manual  10/10 10/11 10/15 10/19  10/9   Flexion/Extension PROM to right knee  10 min  10' 10 min 10'   10'                                                                 Exercise Diary  10/10 10/11 10/15 10/19  10/9   Nu Step L6 x10 min  L3 x10' L6 10 min L3 x10'   L3 10'   Quad Set  10''10x 10" x10 10x 10"  10x10"  10"x10   Hip Add with Ball 5''20x 5" x20 5" 20x 5''20x  5"x20   Clamshell Heavy Green 5''20x Heavy Green 5''20x Heavy Green  20x 5" Heavy green 20x 5" Heavy grn 5"x20   Bridges   Pt defers Defers  defer     SLR 4 way  3x10 flex/abd 1 5#  3x10 Flex/ABD 1# 3x10  Flex/Abd  1 5 # 3x10 flex/abd 1 5#  1# 2x10 flex and abd   Step Up  B 3x10  "B" 3x10 "B" 3x10 "B" 3x10  patient deferred   SLS  3x30" bilat  3x30" b/l 3x30" B/L 3x30" bilat  3x20"   HR/TR  30x  x30 30x 30x  x30   Mini Squat  2x10 Biodex   2x10 Biodex 2x10 Biodex 2x10 biodex    Pt defers   10x biodex, cues   Weight Shifting on BD   Statis fwd/back, S/S 60x ea Static, fwd/back and s/s x60 hits ea Static  Fwd/Back, S/S  60x ea Statis fwd/back, S/S 60x ea    Pt defers  Statis fwd/back, S/S 60x ea    standing hamstring curl  x20       10x    TKE   2x10 blue  TKE Blue TB NV 2x10 5" hold  Blue 2x10 blue  2x10 blue    LAQ/ SAQ         3'' 3x10 bilateral                                                                                                Modalities                                                                  Assessment: Tolerated treatment well  Patient exhibited good technique with therapeutic exercises  Better tolerance to right knee extension PROM during manual therapy  Added LAQ and SAQ this session to work on [employer] and quad strengthening  Plan: Continue per plan of care

## 2018-10-22 ENCOUNTER — OFFICE VISIT (OUTPATIENT)
Dept: PHYSICAL THERAPY | Facility: CLINIC | Age: 28
End: 2018-10-22
Payer: COMMERCIAL

## 2018-10-22 DIAGNOSIS — M25.561 RIGHT KNEE PAIN, UNSPECIFIED CHRONICITY: ICD-10-CM

## 2018-10-22 DIAGNOSIS — Z98.890 S/P ORIF (OPEN REDUCTION INTERNAL FIXATION) FRACTURE: Primary | ICD-10-CM

## 2018-10-22 DIAGNOSIS — Z87.81 S/P ORIF (OPEN REDUCTION INTERNAL FIXATION) FRACTURE: Primary | ICD-10-CM

## 2018-10-22 PROCEDURE — 97140 MANUAL THERAPY 1/> REGIONS: CPT | Performed by: PHYSICAL THERAPIST

## 2018-10-22 PROCEDURE — 97110 THERAPEUTIC EXERCISES: CPT | Performed by: PHYSICAL THERAPIST

## 2018-10-22 NOTE — PROGRESS NOTES
Daily Note     Today's date: 10/22/2018  Patient name: Carolyn Mcpherson  : 1990  MRN: [id number]  Referring provider: Mirna Nails MD  Dx:   Encounter Diagnosis     ICD-10-CM    1  S/P ORIF (open reduction internal fixation) fracture Z96 7     Z87 81    2  Right knee pain, unspecified chronicity M25 561                   Subjective:  " My knee feels pretty good today, since it is early in the morning "       Objective: See treatment diary below  Manual  10/10 10/11 10/15 10/19  10/22   Flexion/Extension PROM to right knee  10 min  10' 10 min 10'   10'                                                                 Exercise Diary  10/10 10/11 10/15 10/19  10/22   Nu Step L6 x10 min  L3 x10' L6 10 min L3 x10'   L3 10'   Quad Set  10''10x 10" x10 10x 10"  10x10"  10"x10   Hip Add with Ball 5''20x 5" x20 5" 20x 5''20x  5"x20   Clamshell Heavy Green 5''20x Heavy Green 5''20x Heavy Green  20x 5" Heavy green 20x 5" Heavy grn 5"x20   Bridges   Pt defers Defers  defer     SLR 4 way  3x10 flex/abd 1 5#  3x10 Flex/ABD 1# 3x10  Flex/Abd  1 5 # 3x10 flex/abd 1 5#  1 5# 2x10 flex and abd   Step Up  B 3x10  "B" 3x10 "B" 3x10 "B" 3x10  B 3x10   SLS  3x30" bilat  3x30" b/l 3x30" B/L 3x30" bilat  3x20"   HR/TR  30x  x30 30x 30x  x30   Mini Squat  2x10 Biodex   2x10 Biodex 2x10 Biodex 2x10 biodex    Pt defers   10x biodex, cues   Weight Shifting on BD   Statis fwd/back, S/S 60x ea Static, fwd/back and s/s x60 hits ea Static  Fwd/Back, S/S  60x ea Statis fwd/back, S/S 60x ea    Pt defers      standing hamstring curl  x20       10x    TKE   2x10 blue  TKE Blue TB NV 2x10 5" hold  Blue 2x10 blue  2x10 blue    LAQ/ SAQ         3'' 3x10 bilateral   1 5 # 3x10 Bilateral                                                                                              Modalities                                                                Assessment: Tolerated treatment well  Patient exhibited good technique with therapeutic exercises   Pt continues to ambulate with an antalgic gait pattern, and lacks TKE on her right LE  Plan: Continue per plan of care

## 2018-10-23 ENCOUNTER — OFFICE VISIT (OUTPATIENT)
Dept: PHYSICAL THERAPY | Facility: CLINIC | Age: 28
End: 2018-10-23
Payer: COMMERCIAL

## 2018-10-23 DIAGNOSIS — Z87.81 S/P ORIF (OPEN REDUCTION INTERNAL FIXATION) FRACTURE: Primary | ICD-10-CM

## 2018-10-23 DIAGNOSIS — M25.561 RIGHT KNEE PAIN, UNSPECIFIED CHRONICITY: ICD-10-CM

## 2018-10-23 DIAGNOSIS — Z98.890 S/P ORIF (OPEN REDUCTION INTERNAL FIXATION) FRACTURE: Primary | ICD-10-CM

## 2018-10-23 PROCEDURE — 97140 MANUAL THERAPY 1/> REGIONS: CPT | Performed by: PHYSICAL THERAPIST

## 2018-10-23 PROCEDURE — 97110 THERAPEUTIC EXERCISES: CPT | Performed by: PHYSICAL THERAPIST

## 2018-10-23 NOTE — PROGRESS NOTES
Daily Note     Today's date: 10/23/2018  Patient name: Camden Ceron  : 1990  MRN: [id number]  Referring provider: Elba Gustafson MD  Dx:   Encounter Diagnosis     ICD-10-CM    1  S/P ORIF (open reduction internal fixation) fracture Z96 7     Z87 81    2  Right knee pain, unspecified chronicity M25 561        Start Time: 0800  Stop Time: 0905  Total time in clinic (min): 65 minutes    Subjective: Worked yesterday and has been doing a lot causing some increased discomfort, but no significant pain today        Objective: See treatment diary below  Manual  10/23 10/11 10/15 10/19  10/22   Flexion/Extension PROM to right knee  10 min  10' 10 min 10'   10'                                                                 Exercise Diary  10/23 10/11 10/15 10/19  10/22   Nu Step L7 x10 min  L3 x10' L6 10 min L3 x10'   L3 10'   Quad Set  HEP 10" x10 10x 10"  10x10"  10"x10   Hip Add with Ball 5" x20 5" x20 5" 20x 5''20x  5"x20   Clamshell Heavy Green 5''20x Heavy Green 5''20x Heavy Green  20x 5" Heavy green 20x 5" Heavy grn 5"x20   Bridges  DC Pt defers Defers  defer     SLR 4 way  3x10  Flex/Abd  1 5 # 3x10 Flex/ABD 1# 3x10  Flex/Abd  1 5 # 3x10 flex/abd 1 5#  1 5# 2x10 flex and abd   Step Up  "B" 3x10 "B" 3x10 "B" 3x10 "B" 3x10  B 3x10   SLS 3x30"  3x30" b/l 3x30" B/L 3x30" bilat  3x20"   HR/TR  TR x30  Ecc HR (2 up, 1 down) 10x x30 30x 30x  x30   Mini Squat 2x10 Biodex  2x10 Biodex 2x10 Biodex 2x10 biodex     Pt defers   10x biodex, cues   Weight Shifting on BD  Static, fwd/back and s/s x60 hits ea Static, fwd/back and s/s x60 hits ea Static  Fwd/Back, S/S  60x ea Statis fwd/back, S/S 60x ea     Pt defers       standing hamstring curl 2x10       10x    TKE Blue 2x10  TKE Blue TB NV 2x10 5" hold  Blue 2x10 blue  2x10 blue    LAQ/ SAQ   1 5 # 3x10 Bilateral each      3'' 3x10 bilateral   1 5 # 3x10 Bilateral                                                                                              Modalities                                                              Assessment: Good activation of VMO in supine position with absent extensor lag during SLR Good progress towards goals with increased ability to tolerate ADLs and work activities  Plan: Continue per plan of care  Progress treatment as tolerated  Patient will be OOT next week

## 2018-10-24 ENCOUNTER — APPOINTMENT (OUTPATIENT)
Dept: PHYSICAL THERAPY | Facility: CLINIC | Age: 28
End: 2018-10-24
Payer: COMMERCIAL

## 2018-10-26 ENCOUNTER — TELEPHONE (OUTPATIENT)
Dept: FAMILY MEDICINE CLINIC | Facility: CLINIC | Age: 28
End: 2018-10-26

## 2018-10-26 ENCOUNTER — OFFICE VISIT (OUTPATIENT)
Dept: PHYSICAL THERAPY | Facility: CLINIC | Age: 28
End: 2018-10-26
Payer: COMMERCIAL

## 2018-10-26 DIAGNOSIS — Z87.81 S/P ORIF (OPEN REDUCTION INTERNAL FIXATION) FRACTURE: Primary | ICD-10-CM

## 2018-10-26 DIAGNOSIS — S32.009D CLOSED FRACTURE OF TRANSVERSE PROCESS OF LUMBAR VERTEBRA WITH ROUTINE HEALING, SUBSEQUENT ENCOUNTER: Primary | ICD-10-CM

## 2018-10-26 DIAGNOSIS — M25.561 RIGHT KNEE PAIN, UNSPECIFIED CHRONICITY: ICD-10-CM

## 2018-10-26 DIAGNOSIS — Z98.890 S/P ORIF (OPEN REDUCTION INTERNAL FIXATION) FRACTURE: Primary | ICD-10-CM

## 2018-10-26 PROCEDURE — 97140 MANUAL THERAPY 1/> REGIONS: CPT | Performed by: PHYSICAL THERAPIST

## 2018-10-26 PROCEDURE — 97110 THERAPEUTIC EXERCISES: CPT | Performed by: PHYSICAL THERAPIST

## 2018-10-26 RX ORDER — TRAMADOL HYDROCHLORIDE 50 MG/1
50 TABLET ORAL EVERY 6 HOURS PRN
Qty: 30 TABLET | Refills: 0 | Status: SHIPPED | OUTPATIENT
Start: 2018-10-26 | End: 2019-03-18 | Stop reason: ALTCHOICE

## 2018-10-26 NOTE — TELEPHONE ENCOUNTER
Lauryn is going to [place] on Monday and would like to know if she could get some pain medication, just a few pills just in case she has pain while traveling    Please advise    [address] [place] [address]    [phone]

## 2018-11-05 ENCOUNTER — OFFICE VISIT (OUTPATIENT)
Dept: PHYSICAL THERAPY | Facility: CLINIC | Age: 28
End: 2018-11-05
Payer: COMMERCIAL

## 2018-11-05 DIAGNOSIS — Z98.890 S/P ORIF (OPEN REDUCTION INTERNAL FIXATION) FRACTURE: Primary | ICD-10-CM

## 2018-11-05 DIAGNOSIS — M25.561 RIGHT KNEE PAIN, UNSPECIFIED CHRONICITY: ICD-10-CM

## 2018-11-05 DIAGNOSIS — Z87.81 S/P ORIF (OPEN REDUCTION INTERNAL FIXATION) FRACTURE: Primary | ICD-10-CM

## 2018-11-05 PROCEDURE — 97110 THERAPEUTIC EXERCISES: CPT

## 2018-11-05 PROCEDURE — 97140 MANUAL THERAPY 1/> REGIONS: CPT

## 2018-11-05 NOTE — PROGRESS NOTES
Daily Note     Today's date: 2018  Patient name: Modesto Bonilla  : 1990  MRN: [id number]  Referring provider: Nela Lyons MD  Dx:   Encounter Diagnosis     ICD-10-CM    1  S/P ORIF (open reduction internal fixation) fracture Z96 7     Z87 81    2  Right knee pain, unspecified chronicity M25 561                   Subjective: Pt  reports she did a lot of walking on her vacation  Her knee does "lock" sometimes but it "goes away " Today she reports it is good sore        Objective: See treatment diary below  Manual  10/23 10/26 11/5 10/19  10/22   Flexion/Extension PROM to right knee  10 min  10' 10 min 10'   10'                                                                 Exercise Diary  10/23 10/26 11/5 10/19  10/22   Nu Step L7 x10 min  L3 x10' L7 10 min L3 x10'   L3 10'   Quad Set  HEP 10" x10 10x 10"  10x10"  10"x10   Hip Add with Ball 5" x20 5" x20 5" 20x 5''20x  5"x20   Clamshell Heavy Green 5''20x Heavy Green 5''20x Heavy Green  20x 5" Heavy green 20x 5" Heavy grn 5"x20   Bridges  DC Pt defers Defers  defer     SLR 4 way  3x10  Flex/Abd  1 5 # 3x10 Flex/ABD 1# 3x10  Flex/Abd  1 5 # 3x10 flex/abd 1 5#  1 5# 2x10 flex and abd   Step Up  "B" 3x10 "B" 3x10 "B" 3x10 "B" 3x10  B 3x10   SLS 3x30"  3x30" b/l 3x30" B/L 3x30" bilat  3x20"   HR/TR  TR x30  Ecc HR (2 up, 1 down) 10x x30 30x 30x  x30   Mini Squat 2x10 Biodex  2x10 Biodex 2x10 Biodex 2x10 biodex     Pt defers   10x biodex, cues   Weight Shifting on BD  Static, fwd/back and s/s x60 hits ea Static, fwd/back and s/s x60 hits ea Static  Fwd/Back, S/S  60x ea Statis fwd/back, S/S 60x ea     Pt defers       standing hamstring curl 2x10    2x10    10x    TKE Blue 2x10  TKE Blue TB NV 2x10 5" hold  Blue 2x10 blue  2x10 blue    LAQ/ SAQ   1 5 # 3x10 Bilateral each   1 5 # 3x10 Bilateral each  1 5# 3x10  b/l  3'' 3x10 bilateral   1 5 # 3x10 Bilateral           Bosu lunges x 10 b/l                                                                                   Modalities                                                                Assessment: Tolerated treatment well  Patient demonstrated fatigue post treatment, exhibited good technique with therapeutic exercises and would benefit from continued PT  Noted persistent antalgic gait  Will add TM next visit to encourage heel strike and equal step length  Plan: Progress treatment as tolerated

## 2018-11-07 ENCOUNTER — OFFICE VISIT (OUTPATIENT)
Dept: PHYSICAL THERAPY | Facility: CLINIC | Age: 28
End: 2018-11-07
Payer: COMMERCIAL

## 2018-11-07 DIAGNOSIS — Z98.890 S/P ORIF (OPEN REDUCTION INTERNAL FIXATION) FRACTURE: Primary | ICD-10-CM

## 2018-11-07 DIAGNOSIS — Z87.81 S/P ORIF (OPEN REDUCTION INTERNAL FIXATION) FRACTURE: Primary | ICD-10-CM

## 2018-11-07 DIAGNOSIS — M25.561 RIGHT KNEE PAIN, UNSPECIFIED CHRONICITY: ICD-10-CM

## 2018-11-07 PROCEDURE — 97110 THERAPEUTIC EXERCISES: CPT

## 2018-11-07 PROCEDURE — 97116 GAIT TRAINING THERAPY: CPT

## 2018-11-07 NOTE — PROGRESS NOTES
Daily Note     Today's date: 2018  Patient name: Gallo Pruitt  : 1990  MRN: [id number]  Referring provider: Pranav Frey MD  Dx:   Encounter Diagnosis     ICD-10-CM    1  S/P ORIF (open reduction internal fixation) fracture Z96 7     Z87 81    2   Right knee pain, unspecified chronicity M25 561                   Subjective: "I am really tired from work " "I've had a rough week " Pt denies pain in the knee at rest        Objective: See treatment diary below  Manual  10/23 10/26 11/5 11/7  10/22   Flexion/Extension PROM to right knee  10 min  10' 10 min NP  10'                                                                 Exercise Diary  10/23 10/26 11/5 11/7  10/22   Nu Step L7 x10 min  L3 x10' L7 10 min L7 x10'   L3 10'   Quad Set  HEP 10" x10 10x 10"  10x10"  10"x10   Hip Add with Ball 5" x20 5" x20 5" 20x 5''20x  5"x20   Clamshell Heavy Green 5''20x Heavy Green 5''20x Heavy Green  20x 5" Kennedy 20x 5" Heavy grn 5"x20   Bridges  DC Pt defers Defers  defer     SLR 4 way  3x10  Flex/Abd  1 5 # 3x10 Flex/ABD 1# 3x10  Flex/Abd  1 5 # 3x10 flex/abd 1 5#  1 5# 2x10 flex and abd   Step Up  "B" 3x10 "B" 3x10 "B" 3x10 "B" 3x10  B 3x10   SLS 3x30"  3x30" b/l 3x30" B/L 3x30" bilat  3x20"   HR/TR  TR x30  Ecc HR (2 up, 1 down) 10x x30 30x 30x  x30   Mini Squat 2x10 Biodex  2x10 Biodex 2x10 Biodex 2x10 biodex  10x biodex, cues   Weight Shifting on BD  Static, fwd/back and s/s x60 hits ea Static, fwd/back and s/s x60 hits ea Static  Fwd/Back, S/S  60x ea Statis fwd/back, S/S 60x ea      standing hamstring curl 2x10    2x10    2x10  10x    TKE Blue 2x10  TKE Blue TB NV 2x10 5" hold  Blue 2x10 blue  2x10 blue    LAQ/ SAQ   1 5 # 3x10 Bilateral each   1 5 # 3x10 Bilateral each  1 5# 3x10  b/l 1 5# 3x10 bilateral   1 5 # 3x10 Bilateral     Lunges      Bosu lunges x 10 b/l  Bosu lunges x 10 b/l      TMill walk        5' @ 1 5 mph Cueing                                                                    Modalities                                                            Assessment: Tolerated treatment well  Manuals held today secondary to time restraints  Initiated a tmill walk to normalize gait pattern  Pt requires cueing to have proper heel strike  Patient demonstrated fatigue post treatment and would benefit from continued PT      Plan: Continue per plan of care  Progress treatment as tolerated

## 2018-11-09 ENCOUNTER — OFFICE VISIT (OUTPATIENT)
Dept: PHYSICAL THERAPY | Facility: CLINIC | Age: 28
End: 2018-11-09
Payer: COMMERCIAL

## 2018-11-09 DIAGNOSIS — M25.561 RIGHT KNEE PAIN, UNSPECIFIED CHRONICITY: ICD-10-CM

## 2018-11-09 DIAGNOSIS — Z87.81 S/P ORIF (OPEN REDUCTION INTERNAL FIXATION) FRACTURE: Primary | ICD-10-CM

## 2018-11-09 DIAGNOSIS — Z98.890 S/P ORIF (OPEN REDUCTION INTERNAL FIXATION) FRACTURE: Primary | ICD-10-CM

## 2018-11-09 PROCEDURE — 97110 THERAPEUTIC EXERCISES: CPT

## 2018-11-09 NOTE — PROGRESS NOTES
Daily Note     Today's date: 2018  Patient name: Izzy Sharma  : 1990  MRN: [id number]  Referring provider: Sarah Nation MD  Dx:   Encounter Diagnosis     ICD-10-CM    1  S/P ORIF (open reduction internal fixation) fracture Z96 7     Z87 81    2  Right knee pain, unspecified chronicity M25 561                   Subjective: "I'm sore today   I've been working a lot "      Objective: See treatment diary below  Manual  10/23 10/26 11/5 11/7 11/9   Flexion/Extension PROM to right knee  10 min  10' 10 min NP NP                                                                 Exercise Diary  10/23 10/26 11/5 11/7  11/9   Nu Step L7 x10 min  L3 x10' L7 10 min L7 x10'   L7 10'   Quad Set  HEP 10" x10 10x 10"  10x10" 10"x10   Hip Add with Ball 5" x20 5" x20 5" 20x 5''20x 5"x20   Clamshell Heavy Green 5''20x Heavy Green 5''20x Heavy Green  20x 5" Grey 20x 5" Grey 5"x20   Bridges  DC Pt defers Defers  defer  defers   SLR 4 way  3x10  Flex/Abd  1 5 # 3x10 Flex/ABD 1# 3x10  Flex/Abd  1 5 # 3x10 flex/abd 1 5# 1 5# 3x10 flex and abd   Step Up  "B" 3x10 "B" 3x10 "B" 3x10 "B" 3x10 B 3x10   SLS 3x30"  3x30" b/l 3x30" B/L 3x30" bilat  3x30"   HR/TR  TR x30  Ecc HR (2 up, 1 down) 10x x30 30x 30x x30   Mini Squat 2x10 Biodex  2x10 Biodex 2x10 Biodex 2x10 biodex 2x10 biodex, cues   Weight Shifting on BD  Static, fwd/back and s/s x60 hits ea Static, fwd/back and s/s x60 hits ea Static  Fwd/Back, S/S  60x ea Statis fwd/back, S/S 60x ea Statis fwd/back, S/S 60x ea    standing hamstring curl 2x10    2x10   2x10  2x10   TKE Blue 2x10  TKE Blue TB NV 2x10 5" hold  Blue 2x10 blue  2x10 blue    LAQ/ SAQ   1 5 # 3x10 Bilateral each   1 5 # 3x10 Bilateral each  1 5# 3x10  b/l 1 5# 3x10 bilateral   1 5 # 3x10 Bilateral     Lunges      Bosu lunges x 10 b/l  Bosu lunges x 10 b/l  Bosu lunges x 10 b/l    TMill walk        5' @ 1 5 mph Cueing   5' @ 1 6 mph Cueing 3%                                                               Modalities                                                            Assessment: Tolerated treatment well  Better tolerance to TMill today  Patient demonstrated fatigue post treatment, exhibited good technique with therapeutic exercises and would benefit from continued PT      Plan: Continue per plan of care  Progress treatment as tolerated

## 2018-11-12 ENCOUNTER — APPOINTMENT (OUTPATIENT)
Dept: PHYSICAL THERAPY | Facility: CLINIC | Age: 28
End: 2018-11-12
Payer: COMMERCIAL

## 2018-11-14 ENCOUNTER — OFFICE VISIT (OUTPATIENT)
Dept: PHYSICAL THERAPY | Facility: CLINIC | Age: 28
End: 2018-11-14
Payer: COMMERCIAL

## 2018-11-14 DIAGNOSIS — Z98.890 S/P ORIF (OPEN REDUCTION INTERNAL FIXATION) FRACTURE: Primary | ICD-10-CM

## 2018-11-14 DIAGNOSIS — M25.561 RIGHT KNEE PAIN, UNSPECIFIED CHRONICITY: ICD-10-CM

## 2018-11-14 DIAGNOSIS — Z87.81 S/P ORIF (OPEN REDUCTION INTERNAL FIXATION) FRACTURE: Primary | ICD-10-CM

## 2018-11-14 PROCEDURE — 97110 THERAPEUTIC EXERCISES: CPT | Performed by: PHYSICAL THERAPIST

## 2018-11-14 NOTE — PROGRESS NOTES
Daily Note     Today's date: 2018  Patient name: Modesto Bonilla  : 1990  MRN: [id number]  Referring provider: Nela Lyons MD  Dx:   Encounter Diagnosis     ICD-10-CM    1  S/P ORIF (open reduction internal fixation) fracture Z96 7     Z87 81    2  Right knee pain, unspecified chronicity M25 561        Start Time: 0900  Stop Time: 1000  Total time in clinic (min): 60 minutes    Subjective: Pt reports "I've had a migraine the past 3 days " No new complaints noted with right knee         Objective: See treatment diary below  Manual  11/14 10/26 11/5 11/7 11/9   Flexion/Extension PROM to right knee  NP  10' 10 min NP NP                                                                 Exercise Diary  11/14 10/26 11/5 11/7  11/9   Nu Step L8 10 min L3 x10' L7 10 min L7 x10'   L7 10'   Quad Set  10x 10" 10" x10 10x 10"  10x10" 10"x10   Hip Add with Ball 20x 5" 5" x20 5" 20x 5''20x 5"x20   Clamshell 20x5"  Grey  Heavy Green 5''20x Heavy Green  20x 5" Grey 20x 5" Grey 5"x20   Bridges  Declined  Pt defers Defers  defer  defers   SLR 4 way  3x10  1 5#  Flex, Abd 3x10 Flex/ABD 1# 3x10  Flex/Abd  1 5 # 3x10 flex/abd 1 5# 1 5# 3x10 flex and abd   Step Up  "B" 3x10 "B" 3x10 "B" 3x10 "B" 3x10 B 3x10   SLS 3x 30"  3x30" b/l 3x30" B/L 3x30" bilat  3x30"   HR/TR  30x x30 30x 30x x30   Mini Squat 2x10  Biodex  2x10 Biodex 2x10 Biodex 2x10 biodex 2x10 biodex, cues   Weight Shifting on BD  Static  Fwd/Back, S/S 60x ea Static, fwd/back and s/s x60 hits ea Static  Fwd/Back, S/S  60x ea Statis fwd/back, S/S 60x ea Statis fwd/back, S/S 60x ea    standing hamstring curl 2x10    2x10   2x10  2x10   TKE 2x10  Blue   TKE Blue TB NV 2x10 5" hold  Blue 2x10 blue  2x10 blue    LAQ/ SAQ  1 5# 3x10  Bilateral    1 5 # 3x10 Bilateral each  1 5# 3x10  b/l 1 5# 3x10 bilateral   1 5 # 3x10 Bilateral     Lunges Bosu Lunges  10x B/L    Bosu lunges x 10 b/l  Bosu lunges x 10 b/l  Bosu lunges x 10 b/l    TMill walk 5 min  1 6 mph  3% incline       5' @ 1 5 mph Cueing   5' @ 1 6 mph Cueing 3%                                                             Modalities                                                              Assessment: Tolerated treatment well  Patient exhibited good technique with therapeutic exercises and would benefit from continued PT  Pt with good tolerance to exercises this visit  No increased pain noted in the right knee  Occasional cuing required for proper exercise technique  Plan: Continue per plan of care  Progress treatment as tolerated

## 2018-11-16 ENCOUNTER — APPOINTMENT (OUTPATIENT)
Dept: PHYSICAL THERAPY | Facility: CLINIC | Age: 28
End: 2018-11-16
Payer: COMMERCIAL

## 2018-11-19 ENCOUNTER — OFFICE VISIT (OUTPATIENT)
Dept: PHYSICAL THERAPY | Facility: CLINIC | Age: 28
End: 2018-11-19
Payer: COMMERCIAL

## 2018-11-19 DIAGNOSIS — Z98.890 S/P ORIF (OPEN REDUCTION INTERNAL FIXATION) FRACTURE: Primary | ICD-10-CM

## 2018-11-19 DIAGNOSIS — M25.561 RIGHT KNEE PAIN, UNSPECIFIED CHRONICITY: ICD-10-CM

## 2018-11-19 DIAGNOSIS — Z87.81 S/P ORIF (OPEN REDUCTION INTERNAL FIXATION) FRACTURE: Primary | ICD-10-CM

## 2018-11-19 PROCEDURE — 97110 THERAPEUTIC EXERCISES: CPT

## 2018-11-20 ENCOUNTER — APPOINTMENT (OUTPATIENT)
Dept: PHYSICAL THERAPY | Facility: CLINIC | Age: 28
End: 2018-11-20
Payer: COMMERCIAL

## 2018-11-21 ENCOUNTER — OFFICE VISIT (OUTPATIENT)
Dept: PHYSICAL THERAPY | Facility: CLINIC | Age: 28
End: 2018-11-21
Payer: COMMERCIAL

## 2018-11-21 DIAGNOSIS — Z87.81 S/P ORIF (OPEN REDUCTION INTERNAL FIXATION) FRACTURE: Primary | ICD-10-CM

## 2018-11-21 DIAGNOSIS — Z98.890 S/P ORIF (OPEN REDUCTION INTERNAL FIXATION) FRACTURE: Primary | ICD-10-CM

## 2018-11-21 DIAGNOSIS — M25.561 RIGHT KNEE PAIN, UNSPECIFIED CHRONICITY: ICD-10-CM

## 2018-11-21 PROCEDURE — 97110 THERAPEUTIC EXERCISES: CPT | Performed by: PHYSICAL THERAPIST

## 2018-11-21 PROCEDURE — 97140 MANUAL THERAPY 1/> REGIONS: CPT | Performed by: PHYSICAL THERAPIST

## 2018-11-21 NOTE — PROGRESS NOTES
Daily Note     Today's date: 2018  Patient name: Guy Ramirez  : 1990  MRN: [id number]  Referring provider: Paresh Beaulieu MD  Dx:   Encounter Diagnosis     ICD-10-CM    1  S/P ORIF (open reduction internal fixation) fracture Z96 7     Z87 81    2  Right knee pain, unspecified chronicity M25 561        Start Time: 0900  Stop Time: 1010  Total time in clinic (min): 70 minutes    Subjective: Pt with no new complaints noted at this time   States "It's the same as it always is "      Objective: See treatment diary below  Manual     Flexion/Extension PROM to right knee  NP  10' 10 min NP NP                                                                 Exercise Diary     Nu Step L8 10 min L6 x10' L7 10 min L7 x10'   L7 10'   Quad Set  10x 10" 10" x10 10x 10"  10x10" 10"x10   Hip Add with Ball 20x 5" 5" x20 20x 5" 5''20x 5"x20   Clamshell 20x5"  [place] 5''20x S/L Abd and IR  30x B/L Kennedy 20x 5" Kennedy 5"x20   Bridges  Declined  Pt defers Defers defer  defers   SLR 4 way  3x10  1 5#  Flex, Abd 3x10 Flex/ABD 1 5# 3x10   Flex/Abd  1 5# 3x10 flex/abd 1 5# 1 5# 3x10 flex and abd   Step Up  "B" 3x10 NP as/pt  "B" 3x10 "B" 3x10 B 3x10   SLS 3x 30"  3x30" b/l 3x30" B/L 3x30" bilat  3x30"   HR/TR  30x x30 30x 30x x30   Mini Squat 2x10  Biodex  2x10 Biodex 2x10 Biodex 2x10 biodex 2x10 biodex, cues   Weight Shifting on BD  Static  Fwd/Back, S/S 60x ea NP as/pt  Static  FwdBack, S/S 60x ea Statis fwd/back, S/S 60x ea Statis fwd/back, S/S 60x ea    standing hamstring curl 2x10  2x10 2x10  2x10  2x10   TKE 2x10  Blue    Blue TB  2x10 Blue TB 2x10 blue  2x10 blue    LAQ/ SAQ  1 5# 3x10  Bilateral    1 5 # 3x10 Bilateral each 3x10 1 5#  B/L each 1 5# 3x10 bilateral   1 5 # 3x10 Bilateral     Lunges Bosu Lunges  10x B/L  Bosu 10x b/l NP  Bosu lunges x 10 b/l  Bosu lunges x 10 b/l    TMill walk 5 min  1 6 mph  3% incline   5 min  1 6 mph  3% incline 5 min  1 6 mph  3% incline   5' @ 1 5 mph Cueing   5' @ 1 6 mph Cueing 3%                                                             Modalities                                                              Assessment: Tolerated treatment well  Patient exhibited good technique with therapeutic exercises and would benefit from continued PT  Pt with good tolerance to exercises this visit  Evy iglesias held per pt's request  Hip abd progress to sidelying clam #3 (hip in abd with IR)  WFL flexibility noted of right LE with stretching  Plan: Continue per plan of care  Progress treatment as tolerated

## 2018-11-23 ENCOUNTER — APPOINTMENT (OUTPATIENT)
Dept: PHYSICAL THERAPY | Facility: CLINIC | Age: 28
End: 2018-11-23
Payer: COMMERCIAL

## 2018-11-26 ENCOUNTER — EVALUATION (OUTPATIENT)
Dept: PHYSICAL THERAPY | Facility: CLINIC | Age: 28
End: 2018-11-26
Payer: COMMERCIAL

## 2018-11-26 ENCOUNTER — TRANSCRIBE ORDERS (OUTPATIENT)
Dept: PHYSICAL THERAPY | Facility: CLINIC | Age: 28
End: 2018-11-26

## 2018-11-26 DIAGNOSIS — M25.561 RIGHT KNEE PAIN, UNSPECIFIED CHRONICITY: ICD-10-CM

## 2018-11-26 DIAGNOSIS — Z98.890 S/P ORIF (OPEN REDUCTION INTERNAL FIXATION) FRACTURE: Primary | ICD-10-CM

## 2018-11-26 DIAGNOSIS — Z87.81 S/P ORIF (OPEN REDUCTION INTERNAL FIXATION) FRACTURE: Primary | ICD-10-CM

## 2018-11-26 PROCEDURE — G8991 OTHER PT/OT GOAL STATUS: HCPCS | Performed by: PHYSICAL THERAPIST

## 2018-11-26 PROCEDURE — 97164 PT RE-EVAL EST PLAN CARE: CPT | Performed by: PHYSICAL THERAPIST

## 2018-11-26 PROCEDURE — G8990 OTHER PT/OT CURRENT STATUS: HCPCS | Performed by: PHYSICAL THERAPIST

## 2018-11-26 PROCEDURE — 97110 THERAPEUTIC EXERCISES: CPT | Performed by: PHYSICAL THERAPIST
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
## 2018-11-27 ENCOUNTER — APPOINTMENT (OUTPATIENT)
Dept: PHYSICAL THERAPY | Facility: CLINIC | Age: 28
End: 2018-11-27
Payer: COMMERCIAL

## 2018-11-29 ENCOUNTER — APPOINTMENT (OUTPATIENT)
Dept: PHYSICAL THERAPY | Facility: CLINIC | Age: 28
End: 2018-11-29
Payer: COMMERCIAL

## 2018-12-03 ENCOUNTER — OFFICE VISIT (OUTPATIENT)
Dept: PHYSICAL THERAPY | Facility: CLINIC | Age: 28
End: 2018-12-03
Payer: COMMERCIAL

## 2018-12-03 DIAGNOSIS — M25.561 RIGHT KNEE PAIN, UNSPECIFIED CHRONICITY: ICD-10-CM

## 2018-12-03 DIAGNOSIS — Z98.890 S/P ORIF (OPEN REDUCTION INTERNAL FIXATION) FRACTURE: Primary | ICD-10-CM

## 2018-12-03 DIAGNOSIS — Z87.81 S/P ORIF (OPEN REDUCTION INTERNAL FIXATION) FRACTURE: Primary | ICD-10-CM

## 2018-12-03 PROCEDURE — 97140 MANUAL THERAPY 1/> REGIONS: CPT | Performed by: PHYSICAL THERAPIST

## 2018-12-03 PROCEDURE — 97110 THERAPEUTIC EXERCISES: CPT | Performed by: PHYSICAL THERAPIST

## 2018-12-03 NOTE — PROGRESS NOTES
Daily Note     Today's date: 12/3/2018  Patient name: Leonor Davidson  : 1990  MRN: [id number]  Referring provider: Андрей Shabazz MD  Dx:   Encounter Diagnosis     ICD-10-CM    1  S/P ORIF (open reduction internal fixation) fracture Z96 7     Z87 81    2   Right knee pain, unspecified chronicity M25 561                   Subjective: "My knee is sore from feeling like it's been buckling lately "      Objective: See treatment diary below    Manual  11/14 11/26 12/3 11/7 11/9   Flexion/Extension PROM to right knee  NP  NP 10 min NP NP                                                                 Exercise Diary  11/14 11/26  12/3 11/7  11/9   Nu Step L8 10 min L6 x10' L6 10 min L7 x10'   L7 10'   Quad Set  10x 10" 10" x10 10x 10"  10x10" 10"x10   Hip Add with Ball 20x 5" 5" x20 20x 5" 5''20x 5"x20   Clamshell 20x5"  Shabazz Quick 5''20x S/L Abd and IR  30x B/L Kennedy 20x 5" Kennedy 5"x20   Bridges  Declined  Pt defers Defers defer  defers   SLR 4 way  3x10  1 5#  Flex, Abd 3x10 Flex/ABD 1 5# 3x10   Flex/Abd  2# 3x10 flex/abd 1 5# 1 5# 3x10 flex and abd   Step Up  "B" 3x10 NP as/pt   "B" 3x10 B 3x10   SLS 3x 30"  3x30" b/l  3x30" bilat  3x30"   HR/TR  30x x30  30x x30   Mini Squat 2x10  Biodex  2x10 Biodex  2x10 biodex 2x10 biodex, cues   Weight Shifting on BD  Static  Fwd/Back, S/S 60x ea NP as/pt   Statis fwd/back, S/S 60x ea Statis fwd/back, S/S 60x ea    standing hamstring curl 2x10  2x10 DC - machine  2x10  2x10   TKE 2x10  Blue    Blue TB   2x10 blue  2x10 blue    LAQ/ SAQ  1 5# 3x10  Bilateral    1 5 # 3x10 Bilateral each 3x10 2#  B/L each 1 5# 3x10 bilateral   1 5 # 3x10 Bilateral     Lunges Bosu Lunges  10x B/L  Bosu 10x b/l Resume NV  Bosu lunges x 10 b/l  Bosu lunges x 10 b/l    TMill walk 5 min  1 6 mph  3% incline   5 min  1 6 mph  3% incline 5 min  1 6 mph  5% incline   5' @ 1 5 mph Cueing   5' @ 1 6 mph Cueing 3%    Leg Extension     20 3x10  20# 3x10        Leg Curl     20 3x10  20# 3x10        Hip Adduction     90 3x10  90# 3x10        Hip Abduction   Leg Press     70 3x10   120 3x10  70# 3x10  120# 3x10             Modalities                                                                 Assessment: Tolerated treatment well  Did not progress due to recent progressions from last visit  Review lunges with patient next visit and monitor LE alignment, progress reps as able  Patient demonstrated fatigue post treatment, exhibited good technique with therapeutic exercises and would benefit from continued PT      Plan: Continue per plan of care  Progress treatment as tolerated

## 2018-12-04 ENCOUNTER — OFFICE VISIT (OUTPATIENT)
Dept: PHYSICAL THERAPY | Facility: CLINIC | Age: 28
End: 2018-12-04
Payer: COMMERCIAL

## 2018-12-04 DIAGNOSIS — Z87.81 S/P ORIF (OPEN REDUCTION INTERNAL FIXATION) FRACTURE: Primary | ICD-10-CM

## 2018-12-04 DIAGNOSIS — Z98.890 S/P ORIF (OPEN REDUCTION INTERNAL FIXATION) FRACTURE: Primary | ICD-10-CM

## 2018-12-04 DIAGNOSIS — M25.561 RIGHT KNEE PAIN, UNSPECIFIED CHRONICITY: ICD-10-CM

## 2018-12-04 PROCEDURE — 97140 MANUAL THERAPY 1/> REGIONS: CPT | Performed by: PHYSICAL THERAPIST

## 2018-12-04 PROCEDURE — 97110 THERAPEUTIC EXERCISES: CPT | Performed by: PHYSICAL THERAPIST

## 2018-12-04 NOTE — PROGRESS NOTES
Daily Note     Today's date: 2018  Patient name: Gallo Pruitt  : 1990  MRN: [id number]  Referring provider: Pranav Frey MD  Dx:   Encounter Diagnosis     ICD-10-CM    1  S/P ORIF (open reduction internal fixation) fracture Z96 7     Z87 81    2  Right knee pain, unspecified chronicity M25 561                   Subjective: "My foot and ankle are a little sore today "      Objective: See treatment diary below    Manual  11/14 11/26 12/3 12/4 11/9   Flexion/Extension PROM to right knee  NP  NP 10 min 10 min NP                                                                 Exercise Diary  11/14 11/26  12/3 12/4  11/9   Nu Step L8 10 min L6 x10' L6 10 min L5 x10'   L7 10'   Quad Set  10x 10" 10" x10 10x 10"  10x10" 10"x10   Hip Add with Ball 20x 5" 5" x20 20x 5" 5''20x 5"x20   Clamshell 20x5"  Kennedy  Kennedy 5''20x S/L Abd and IR  30x B/L Kennedy 20x 5" Kennedy 5"x20   Bridges  Declined  Pt defers Defers defer  defers   SLR 4 way  3x10  1 5#  Flex, Abd 3x10 Flex/ABD 1 5# 3x10   Flex/Abd  2# 3x10 flex/abd 2# 1 5# 3x10 flex and abd   Step Up  "B" 3x10 NP as/pt     B 3x10   SLS 3x 30"  3x30" b/l    3x30"   HR/TR  30x x30   x30   Mini Squat 2x10  Biodex  2x10 Biodex   2x10 biodex, cues   Weight Shifting on BD  Static  Fwd/Back, S/S 60x ea NP as/pt    Statis fwd/back, S/S 60x ea    standing hamstring curl 2x10  2x10 DC - machine   2x10   TKE 2x10  Blue    Blue TB     2x10 blue    LAQ/ SAQ  1 5# 3x10  Bilateral    1 5 # 3x10 Bilateral each 3x10 2#  B/L each 2# 3x10 bilateral   1 5 # 3x10 Bilateral     Lunges Bosu Lunges  10x B/L  Bosu 10x b/l Resume NV Bosu lunges 1x10, 5", b/l  Bosu lunges x 10 b/l    TMill walk 5 min  1 6 mph  3% incline   5 min  1 6 mph  3% incline 5 min  1 6 mph  5% incline    5' @ 1 6 mph Cueing 3%    Leg Extension     20 3x10  20# 3x10  10# 3x10      Leg Curl     20 3x10  20# 3x10  20# 3x10      Hip Adduction     90 3x10  90# 3x10  90# 3x10      Hip Abduction   Leg Press     70 3x10   120 3x10 70# 3x10  120# 3x10  70# 3x10  120# 3x10           Modalities                                                               Assessment: Tolerated treatment fair  Reported onset of hip pain after 10 reps of lunges  Decreased weight for leg extension machine this date due to patient compensating using LLE  Patient demonstrated fatigue post treatment, exhibited good technique with therapeutic exercises and would benefit from continued PT      Plan: Continue per plan of care  Progress treatment as tolerated

## 2018-12-05 ENCOUNTER — APPOINTMENT (OUTPATIENT)
Dept: PHYSICAL THERAPY | Facility: CLINIC | Age: 28
End: 2018-12-05
Payer: COMMERCIAL

## 2018-12-06 ENCOUNTER — OFFICE VISIT (OUTPATIENT)
Dept: PHYSICAL THERAPY | Facility: CLINIC | Age: 28
End: 2018-12-06
Payer: COMMERCIAL

## 2018-12-06 DIAGNOSIS — Z98.890 S/P ORIF (OPEN REDUCTION INTERNAL FIXATION) FRACTURE: Primary | ICD-10-CM

## 2018-12-06 DIAGNOSIS — M25.561 RIGHT KNEE PAIN, UNSPECIFIED CHRONICITY: ICD-10-CM

## 2018-12-06 DIAGNOSIS — Z87.81 S/P ORIF (OPEN REDUCTION INTERNAL FIXATION) FRACTURE: Primary | ICD-10-CM

## 2018-12-06 PROCEDURE — 97140 MANUAL THERAPY 1/> REGIONS: CPT

## 2018-12-06 PROCEDURE — 97110 THERAPEUTIC EXERCISES: CPT

## 2018-12-06 NOTE — PROGRESS NOTES
Daily Note     Today's date: 2018  Patient name: Nathanael Treviño  : 1990  MRN: [id number]  Referring provider: Bobby De Jesus MD  Dx:   Encounter Diagnosis     ICD-10-CM    1  S/P ORIF (open reduction internal fixation) fracture Z96 7     Z87 81    2  Right knee pain, unspecified chronicity M25 561                   Subjective: "My knee otis and locks and I have to be careful walking  The leg doesn't feel like it there sometimes "      Objective: See treatment diary below  Manual  11/14 11/26 12/3 12/4 12/6   Flexion/Extension PROM to right knee  NP  NP 10 min 10 min 10 min                                                                 Exercise Diary  11/14 11/26  12/3 12/4  12/6   Nu Step L8 10 min L6 x10' L6 10 min L5 x10'   L7 10'   Quad Set  10x 10" 10" x10 10x 10"  10x10" 10"x10   Hip Add with Ball 20x 5" 5" x20 20x 5" 5''20x 5"x20   Clamshell 20x5"  Adali Conch 5''20x S/L Abd and IR  30x B/L Kennedy 20x 5" Kennedy 5"x20   Bridges  Declined  Pt defers Defers defer  defers   SLR 4 way  3x10  1 5#  Flex, Abd 3x10 Flex/ABD 1 5# 3x10   Flex/Abd  2# 3x10 flex/abd 2# 2# 3x10 flex and abd   Step Up  "B" 3x10 NP as/pt        SLS 3x 30"  3x30" b/l      HR/TR  30x x30      Mini Squat 2x10  Biodex  2x10 Biodex      Weight Shifting on BD  Static  Fwd/Back, S/S 60x ea NP as/pt        standing hamstring curl 2x10  2x10 DC - machine      TKE 2x10  Blue    Blue TB        LAQ/ SAQ  1 5# 3x10  Bilateral    1 5 # 3x10 Bilateral each 3x10 2#  B/L each 2# 3x10 bilateral   2# 3x10 Bilateral     Lunges Bosu Lunges  10x B/L  Bosu 10x b/l Resume NV Bosu lunges 1x10, 5", b/l  Bosu lunges x 10 b/l 5"    TMill walk 5 min  1 6 mph  3% incline   5 min  1 6 mph  3% incline 5 min  1 6 mph  5% incline    NP    Leg Extension     20 3x10  20# 3x10  10# 3x10  20# 3x10    Leg Curl     20 3x10  20# 3x10  20# 3x10  20# 3x10    Hip Adduction     90 3x10  90# 3x10  90# 3x10  90# 3x10    Hip Abduction   Leg Press   HR on LP    70 3x10   120 3x10 70# 3x10  120# 3x10  70# 3x10  120# 3x10  70# 3x10  140# 3x10  120# 3x10         Modalities                                                            Assessment: Tolerated treatment well  Patient demonstrated fatigue post treatment, exhibited good technique with therapeutic exercises and would benefit from continued PT      Plan: Continue per plan of care  Progress treatment as tolerated

## 2018-12-10 ENCOUNTER — OFFICE VISIT (OUTPATIENT)
Dept: PHYSICAL THERAPY | Facility: CLINIC | Age: 28
End: 2018-12-10
Payer: COMMERCIAL

## 2018-12-10 DIAGNOSIS — M25.561 RIGHT KNEE PAIN, UNSPECIFIED CHRONICITY: ICD-10-CM

## 2018-12-10 DIAGNOSIS — Z98.890 S/P ORIF (OPEN REDUCTION INTERNAL FIXATION) FRACTURE: Primary | ICD-10-CM

## 2018-12-10 DIAGNOSIS — Z87.81 S/P ORIF (OPEN REDUCTION INTERNAL FIXATION) FRACTURE: Primary | ICD-10-CM

## 2018-12-10 PROCEDURE — 97110 THERAPEUTIC EXERCISES: CPT

## 2018-12-10 PROCEDURE — 97140 MANUAL THERAPY 1/> REGIONS: CPT

## 2018-12-10 NOTE — PROGRESS NOTES
Daily Note     Today's date: 12/10/2018  Patient name: Trevor Thornton  : 1990  MRN: [id number]  Referring provider: Sherri Sniedr MD  Dx:   Encounter Diagnosis     ICD-10-CM    1  S/P ORIF (open reduction internal fixation) fracture Z96 7     Z87 81    2  Right knee pain, unspecified chronicity M25 561        Start Time: 0800  Stop Time: 0900  Total time in clinic (min): 60 minutes    Subjective: "I feel the same  My knee just doesn't like me "      Objective: See treatment diary below  Manual  12/10 11/26 12/3 12/4 12/6   Flexion/Extension PROM to right knee  10 min NP 10 min 10 min 10 min                                                                 Exercise Diary  12/10 11/26  12/3 12/4  12/6   Nu Step L8 10 min L6 x10' L6 10 min L5 x10'   L7 10'   Quad Set  10x 10" 10" x10 10x 10"  10x10" 10"x10   Hip Add with Ball -- 5" x20 20x 5" 5''20x 5"x20   Clamshell -- Kennedy 5''20x S/L Abd and IR  30x B/L Kennedy 20x 5" Kennedy 5"x20   Bridges  Declined  Pt defers Defers defer  defers   SLR 4 way  3x10 2#  Flex, Abd 3x10 Flex/ABD 1 5# 3x10   Flex/Abd  2# 3x10 flex/abd 2# 2# 3x10 flex and abd   Step Up   NP as/pt        SLS 3x30" b/l 3x30" b/l      HR/TR  x30 TR x30      Mini Squat  2x10 Biodex      Weight Shifting on BD   NP as/pt        standing hamstring curl   2x10 DC - machine      TKE    Blue TB        LAQ/ SAQ  2# 3x10  Bilateral    1 5 # 3x10 Bilateral each 3x10 2#  B/L each 2# 3x10 bilateral   2# 3x10 Bilateral     Lunges Bosu Lunges  10x B/L 5"  Bosu 10x b/l Resume NV Bosu lunges 1x10, 5", b/l  Bosu lunges x 10 b/l 5"    TMill walk    5 min  1 6 mph  3% incline 5 min  1 6 mph  5% incline    NP    Leg Extension   20# 3x10  20 3x10  20# 3x10  10# 3x10  20# 3x10    Leg Curl   30# 3x10  20 3x10  20# 3x10  20# 3x10  20# 3x10    Hip Adduction   90# 3x10  90 3x10  90# 3x10  90# 3x10  90# 3x10    Hip Abduction   Leg Press   HR on LP  90# 3x10  140# 3x10  140# 3x10  70 3x10   120 3x10  70# 3x10  120# 3x10  70# 3x10  120# 3x10  70# 3x10  140# 3x10  120# 3x10         Modalities                                                            Assessment: Tolerated treatment well  Increased weight on multiple machines to increase strength  Patient demonstrated fatigue post treatment and would benefit from continued PT      Plan: Continue per plan of care  Progress treatment as tolerated

## 2018-12-12 ENCOUNTER — OFFICE VISIT (OUTPATIENT)
Dept: PHYSICAL THERAPY | Facility: CLINIC | Age: 28
End: 2018-12-12
Payer: COMMERCIAL

## 2018-12-12 DIAGNOSIS — Z98.890 S/P ORIF (OPEN REDUCTION INTERNAL FIXATION) FRACTURE: Primary | ICD-10-CM

## 2018-12-12 DIAGNOSIS — M25.561 RIGHT KNEE PAIN, UNSPECIFIED CHRONICITY: ICD-10-CM

## 2018-12-12 DIAGNOSIS — Z87.81 S/P ORIF (OPEN REDUCTION INTERNAL FIXATION) FRACTURE: Primary | ICD-10-CM

## 2018-12-12 PROCEDURE — 97110 THERAPEUTIC EXERCISES: CPT

## 2018-12-12 PROCEDURE — 97140 MANUAL THERAPY 1/> REGIONS: CPT

## 2018-12-12 NOTE — PROGRESS NOTES
Daily Note     Today's date: 2018  Patient name: Ene Gunter  : 1990  MRN: [id number]  Referring provider: Min Munguia MD  Dx:   Encounter Diagnosis     ICD-10-CM    1  S/P ORIF (open reduction internal fixation) fracture Z96 7     Z87 81    2  Right knee pain, unspecified chronicity M25 561                   Subjective: "My knee is okay "       Objective: See treatment diary below  Manual  12/10 12/12 12/3 12/4 12/6   Flexion/Extension PROM to right knee  10 min 10 min 10 min 10 min 10 min                                                                 Exercise Diary  12/10 12/12 12/3 12/4  12/6   Nu Step L8 10 min L7 x10' L6 10 min L5 x10'   L7 10'   Quad Set  10x 10" 10" x10 10x 10"  10x10" 10"x10   Hip Add with Ball -- -- 20x 5" 5''20x 5"x20   Clamshell -- -- S/L Abd and IR  30x B/L Kennedy 20x 5" Kennedy 5"x20   Bridges  Declined  Pt defers Defers defer  defers   SLR 4 way  3x10 2#  Flex, Abd 3x10 Flex/ABD 2# 3x10   Flex/Abd  2# 3x10 flex/abd 2# 2# 3x10 flex and abd   Step Up   --      SLS 3x30" b/l 3x30" b/l      HR/TR  x30 TR x30      Mini Squat  --      Weight Shifting on BD   --       standing hamstring curl  -- DC - machine      TKE  --       LAQ/ SAQ  2# 3x10  Bilateral  2# 3x10 Bilateral each 3x10 2#  B/L each 2# 3x10 bilateral   2# 3x10 Bilateral     Lunges Bosu Lunges  10x B/L 5" Bosu 5" 2x10 b/l Resume NV Bosu lunges 1x10, 5", b/l  Bosu lunges x 10 b/l 5"    TMill walk   -- 5 min  1 6 mph  5% incline    NP    Leg Extension   20# 3x10  30 3x10  20# 3x10  10# 3x10  20# 3x10    Leg Curl   30# 3x10  30 3x10  20# 3x10  20# 3x10  20# 3x10    Hip Adduction   90# 3x10  90 3x10  90# 3x10  90# 3x10  90# 3x10    Hip Abduction   Leg Press   HR on LP  90# 3x10  140# 3x10  140# 3x10  90 3x10   140 3x10  140 3x10  70# 3x10  120# 3x10  70# 3x10  120# 3x10  70# 3x10  140# 3x10  120# 3x10         Modalities                                                            Assessment: Tolerated treatment well  Increased weight during knee extensions to increase quad strength  Patient demonstrated fatigue post treatment and exhibited good technique with therapeutic exercises      Plan: Continue per plan of care

## 2018-12-18 ENCOUNTER — OFFICE VISIT (OUTPATIENT)
Dept: PHYSICAL THERAPY | Facility: CLINIC | Age: 28
End: 2018-12-18
Payer: COMMERCIAL

## 2018-12-18 DIAGNOSIS — M25.561 RIGHT KNEE PAIN, UNSPECIFIED CHRONICITY: ICD-10-CM

## 2018-12-18 DIAGNOSIS — Z87.81 S/P ORIF (OPEN REDUCTION INTERNAL FIXATION) FRACTURE: Primary | ICD-10-CM

## 2018-12-18 DIAGNOSIS — Z98.890 S/P ORIF (OPEN REDUCTION INTERNAL FIXATION) FRACTURE: Primary | ICD-10-CM

## 2018-12-18 PROCEDURE — 97110 THERAPEUTIC EXERCISES: CPT | Performed by: PHYSICAL THERAPIST

## 2018-12-18 NOTE — PROGRESS NOTES
Daily Note     Today's date: 2018  Patient name: Brianda Arriaga  : 1990  MRN: [id number]  Referring provider: Arcadio Goins MD  Dx:   Encounter Diagnosis     ICD-10-CM    1  S/P ORIF (open reduction internal fixation) fracture Z96 7     Z87 81    2   Right knee pain, unspecified chronicity M25 561                   Subjective: " My knee is there, It still doesn't go straight all the way "       Objective: See treatment diary below  Manual  12/10 12/12 12/18 12/4 12/6   Flexion/Extension PROM to right knee  10 min 10 min Pt Defers  10 min 10 min                                                                 Exercise Diary  12/10 12/12 12/18 12/4  12/6   Nu Step L8 10 min L7 x10' L6 10 min L5 x10'   L7 10'   Quad Set  10x 10" 10" x10 10x 10"  10x10" 10"x10   Hip Add with Ball -- -- 20x 5" 5''20x 5"x20   Clamshell -- -- S/L Abd and IR  30x B/L Kennedy 20x 5" Kennedy 5"x20   Bridges  Declined  Pt defers Defers defer  defers   SLR 4 way  3x10 2#  Flex, Abd 3x10 Flex/ABD 2# 3x10   Flex/Abd  2# 3x10 flex/abd 2# 2# 3x10 flex and abd   Step Up   --      SLS 3x30" b/l 3x30" b/l      HR/TR  x30 TR x30      Mini Squat  --      Weight Shifting on BD   --       standing hamstring curl  -- DC - machine      TKE  --       LAQ/ SAQ  2# 3x10  Bilateral  2# 3x10 Bilateral each 3x10 2#  B/L each 2# 3x10 bilateral   2# 3x10 Bilateral     Lunges Bosu Lunges  10x B/L 5" Bosu 5" 2x10 b/l Resume NV Bosu lunges 1x10, 5", b/l  Bosu lunges x 10 b/l 5"    TMill walk   --    NP    Leg Extension   20# 3x10  30 3x10  30# 3x10  10# 3x10  20# 3x10    Leg Curl   30# 3x10  30 3x10  30# 3x10  20# 3x10  20# 3x10    Hip Adduction   90# 3x10  90 3x10  90# 3x10  90# 3x10  90# 3x10    Hip Abduction   Leg Press   HR on LP  90# 3x10  140# 3x10  140# 3x10  90 3x10   140 3x10  140 3x10  90# 3x10  140 3x10  140 3x10   70# 3x10  120# 3x10  70# 3x10  140# 3x10  120# 3x10         Modalities                                                       Assessment: Tolerated treatment well  Patient exhibited good technique with therapeutic exercises  Pt Continues to ambulate with a decreased step and stride length on her right LE during ambulation  Plan: Continue per plan of care

## 2018-12-19 ENCOUNTER — OFFICE VISIT (OUTPATIENT)
Dept: PHYSICAL THERAPY | Facility: CLINIC | Age: 28
End: 2018-12-19
Payer: COMMERCIAL

## 2018-12-19 DIAGNOSIS — Z87.81 S/P ORIF (OPEN REDUCTION INTERNAL FIXATION) FRACTURE: Primary | ICD-10-CM

## 2018-12-19 DIAGNOSIS — M25.561 RIGHT KNEE PAIN, UNSPECIFIED CHRONICITY: ICD-10-CM

## 2018-12-19 DIAGNOSIS — Z98.890 S/P ORIF (OPEN REDUCTION INTERNAL FIXATION) FRACTURE: Primary | ICD-10-CM

## 2018-12-19 PROCEDURE — 97110 THERAPEUTIC EXERCISES: CPT | Performed by: PHYSICAL THERAPIST

## 2018-12-19 PROCEDURE — 97140 MANUAL THERAPY 1/> REGIONS: CPT | Performed by: PHYSICAL THERAPIST

## 2018-12-19 NOTE — PROGRESS NOTES
Daily Note     Today's date: 2018  Patient name: Daniella Rodriguez  : 1990  MRN: [id number]  Referring provider: Carolyne Nissen, MD  Dx:   Encounter Diagnosis     ICD-10-CM    1  S/P ORIF (open reduction internal fixation) fracture Z96 7     Z87 81    2  Right knee pain, unspecified chronicity M25 561        Start Time: 0800  Stop Time: 0855  Total time in clinic (min): 55 minutes    Subjective: Pt with no new complaints noted at this time  Continues to report varying soreness in the right knee dependent upon how much she works         Objective: See treatment diary below  Manual  12/10 12/12 12/18 12/19 12/6   Flexion/Extension PROM to right knee  10 min 10 min Pt Defers  10 min 10 min                                                                 Exercise Diary  12/10 12/12 12/18 12/19  12/6   Nu Step L8 10 min L7 x10' L6 10 min L7 10 min  L7 10'   Quad Set  10x 10" 10" x10 10x 10" NP 10"x10   Hip Add with Ball -- -- 20x 5" NP 5"x20   Clamshell -- -- S/L Abd and IR  30x B/L NP Kennedy 5"x20   Bridges  Declined  Pt defers Defers Defers   defers   SLR 4 way  3x10 2#  Flex, Abd 3x10 Flex/ABD 2# 3x10   Flex/Abd  2# 3x10  Flex/Abd  2# 2# 3x10 flex and abd   Step Up    --        SLS 3x30" b/l 3x30" b/l        HR/TR  x30 TR x30        Mini Squat   --        Weight Shifting on BD    --         standing hamstring curl   -- DC - machine NP     TKE   --         LAQ/ SAQ  2# 3x10  Bilateral  2# 3x10 Bilateral each 3x10 2#  B/L each 3x10 2#  B/L ea  2# 3x10 Bilateral     Lunges Bosu Lunges  10x B/L 5" Bosu 5" 2x10 b/l Resume NV   Bosu lunges x 10 b/l 5"    TMill walk   --     NP    Leg Extension   20# 3x10  30 3x10  30# 3x10 30# 3x10  20# 3x10    Leg Curl   30# 3x10  30 3x10  30# 3x10 30# 3x10  20# 3x10    Hip Adduction   90# 3x10  90 3x10  90# 3x10 90# 3x10  90# 3x10    Hip Abduction   Leg Press   HR on LP  90# 3x10  140# 3x10  140# 3x10  90 3x10   140 3x10  140 3x10  90# 3x10  140 3x10  140 3x10  90# 3x10  140# 3x10  140# 3x10  70# 3x10  140# 3x10  120# 3x10         Modalities                                                               Assessment: Tolerated treatment well  Patient exhibited good technique with therapeutic exercises and would benefit from continued PT  Pt with good tolerance to exercises this visit  No increased pain noted t/o tx  Continues to progress towards full ROM of the right knee  Plan: Continue per plan of care  Progress treatment as tolerated

## 2018-12-20 ENCOUNTER — APPOINTMENT (OUTPATIENT)
Dept: PHYSICAL THERAPY | Facility: CLINIC | Age: 28
End: 2018-12-20
Payer: COMMERCIAL

## 2018-12-24 ENCOUNTER — APPOINTMENT (OUTPATIENT)
Dept: PHYSICAL THERAPY | Facility: CLINIC | Age: 28
End: 2018-12-24
Payer: COMMERCIAL

## 2018-12-27 ENCOUNTER — APPOINTMENT (OUTPATIENT)
Dept: PHYSICAL THERAPY | Facility: CLINIC | Age: 28
End: 2018-12-27
Payer: COMMERCIAL

## 2019-01-02 ENCOUNTER — TRANSCRIBE ORDERS (OUTPATIENT)
Dept: PHYSICAL THERAPY | Facility: CLINIC | Age: 29
End: 2019-01-02

## 2019-01-02 ENCOUNTER — EVALUATION (OUTPATIENT)
Dept: PHYSICAL THERAPY | Facility: CLINIC | Age: 29
End: 2019-01-02
Payer: COMMERCIAL

## 2019-01-02 DIAGNOSIS — Z98.890 S/P ORIF (OPEN REDUCTION INTERNAL FIXATION) FRACTURE: Primary | ICD-10-CM

## 2019-01-02 DIAGNOSIS — M25.561 CHRONIC PAIN OF RIGHT KNEE: ICD-10-CM

## 2019-01-02 DIAGNOSIS — M25.561 RIGHT KNEE PAIN, UNSPECIFIED CHRONICITY: ICD-10-CM

## 2019-01-02 DIAGNOSIS — Z87.81 S/P ORIF (OPEN REDUCTION INTERNAL FIXATION) FRACTURE: Primary | ICD-10-CM

## 2019-01-02 DIAGNOSIS — G89.29 CHRONIC PAIN OF RIGHT KNEE: ICD-10-CM

## 2019-01-02 PROCEDURE — G8991 OTHER PT/OT GOAL STATUS: HCPCS | Performed by: PHYSICAL THERAPIST

## 2019-01-02 PROCEDURE — G8990 OTHER PT/OT CURRENT STATUS: HCPCS | Performed by: PHYSICAL THERAPIST

## 2019-01-02 PROCEDURE — 97110 THERAPEUTIC EXERCISES: CPT | Performed by: PHYSICAL THERAPIST

## 2019-01-02 PROCEDURE — 97164 PT RE-EVAL EST PLAN CARE: CPT | Performed by: PHYSICAL THERAPIST
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
## 2019-01-07 ENCOUNTER — OFFICE VISIT (OUTPATIENT)
Dept: PHYSICAL THERAPY | Facility: CLINIC | Age: 29
End: 2019-01-07
Payer: COMMERCIAL

## 2019-01-07 DIAGNOSIS — Z87.81 S/P ORIF (OPEN REDUCTION INTERNAL FIXATION) FRACTURE: Primary | ICD-10-CM

## 2019-01-07 DIAGNOSIS — Z98.890 S/P ORIF (OPEN REDUCTION INTERNAL FIXATION) FRACTURE: Primary | ICD-10-CM

## 2019-01-07 DIAGNOSIS — M25.561 RIGHT KNEE PAIN, UNSPECIFIED CHRONICITY: ICD-10-CM

## 2019-01-07 PROCEDURE — 97140 MANUAL THERAPY 1/> REGIONS: CPT | Performed by: PHYSICAL THERAPIST

## 2019-01-07 PROCEDURE — 97110 THERAPEUTIC EXERCISES: CPT | Performed by: PHYSICAL THERAPIST

## 2019-01-07 NOTE — PROGRESS NOTES
Daily Note     Today's date: 2019  Patient name: Mary Jane Fabian  : 1990  MRN: [id number]  Referring provider: Adrian Garnett MD  Dx:   Encounter Diagnosis     ICD-10-CM    1  S/P ORIF (open reduction internal fixation) fracture Z96 7     Z87 81    2  Right knee pain, unspecified chronicity M25 561                   Subjective: " My knee feels pretty good, I just want to do the gym "       Objective: See treatment diary below      Assessment: Tolerated treatment well  Patient exhibited good technique with therapeutic exercises      Plan: Continue per plan of care        Manual     Flexion/Extension PROM to right knee  10 min 10 min Pt Defers  10 min 10 min                                                                 Exercise Diary     Nu Step -- L7 x10' L6 10 min L7 10 min  L7 10'   Quad Set  --  10" x10 10x 10" NP 10"x10   Hip Add with Ball -- -- 20x 5" NP 5"x20   Clamshell -- -- S/L Abd and IR  30x B/L NP Kennedy 5"x20   Bridges  Declined  Pt defers Defers Defers   defers   SLR 4 way  3x10 2#  Flex, Abd 3x10 Flex/ABD 2# 3x10   Flex/Abd  2# 3x10  Flex/Abd  2# 2# 3x10 flex and abd   Step Up    --        SLS 3x30" b/l 3x30" b/l        HR/TR  x30 TR x30        Mini Squat   --        Weight Shifting on BD    --         standing hamstring curl   -- DC - machine NP     TKE   --         LAQ/ SAQ  2# 3x10  Bilateral  2# 3x10 Bilateral each 3x10 2#  B/L each 3x10 2#  B/L ea  2# 3x10 Bilateral     Lunges Bosu Lunges  10x B/L 5" Bosu 5" 2x10 b/l Resume NV   Bosu lunges x 10 b/l 5"    TMill walk   --     NP    Leg Extension  35# 3x10  30 3x10  30# 3x10 30# 3x10  35 # 3x10    Leg Curl  40# 3x10  30 3x10  30# 3x10 30# 3x10  40 # 3x10    Hip Adduction  100# 3x10  90 3x10  90# 3x10 90# 3x10  100#3x10    Hip Abduction   Leg Press   HR on # 3x10  160# 3x10  160# 3x10  90 3x10   140 3x10  140 3x10  90# 3x10  140 3x10  140 3x10  90# 3x10  140# 3x10  140# 3x10  100#3x10  160# 3x10  160# 3x10

## 2019-01-09 ENCOUNTER — OFFICE VISIT (OUTPATIENT)
Dept: PHYSICAL THERAPY | Facility: CLINIC | Age: 29
End: 2019-01-09
Payer: COMMERCIAL

## 2019-01-09 DIAGNOSIS — Z87.81 S/P ORIF (OPEN REDUCTION INTERNAL FIXATION) FRACTURE: Primary | ICD-10-CM

## 2019-01-09 DIAGNOSIS — Z98.890 S/P ORIF (OPEN REDUCTION INTERNAL FIXATION) FRACTURE: Primary | ICD-10-CM

## 2019-01-09 DIAGNOSIS — M25.561 RIGHT KNEE PAIN, UNSPECIFIED CHRONICITY: ICD-10-CM

## 2019-01-09 PROCEDURE — 97140 MANUAL THERAPY 1/> REGIONS: CPT

## 2019-01-09 PROCEDURE — 97110 THERAPEUTIC EXERCISES: CPT

## 2019-01-09 NOTE — PROGRESS NOTES
Daily Note     Today's date: 2019  Patient name: Avila Kamara  : 1990  MRN: [id number]  Referring provider: Travis Ramirez MD  Dx:   Encounter Diagnosis     ICD-10-CM    1  S/P ORIF (open reduction internal fixation) fracture Z96 7     Z87 81    2  Right knee pain, unspecified chronicity M25 561                   Subjective: "I'm fine " Pt denies all pain  Objective: See treatment diary below  Manual     Flexion/ExtensionPROM to right knee  10 min 10 min Pt Defers  10 min 10 min                                                                 Exercise Diary     Nu Step -- L7 x10' L6 10 min L7 10 min  L7 10'   Quad Set  --  -- 10x 10" NP 10"x10   Hip Add with Ball -- -- 20x 5" NP 5"x20   Clamshell -- -- S/L Abd and IR  30x B/L NP Kennedy 5"x20   Bridges  Declined  Pt defers Defers Defers   defers   SLR 4 way  3x10 2#  Flex, Abd -- 3x10   Flex/Abd  2# 3x10  Flex/Abd  2# 2# 3x10 flex and abd   Step Up    --        SLS 3x30" b/l 3x30" b/l        HR/TR  x30 TR x30        Mini Squat   --        Weight Shifting on BD    --         standing hamstring curl   -- DC - machine NP     TKE   --         LAQ/ SAQ  2# 3x10  Bilateral  2# 3x10  Bilateral  3x10 2#  B/L each 3x10 2#  B/L ea  2# 3x10 Bilateral     Lunges Bosu Lunges  10x B/L 5" Bosu 5" 2x10 b/l Resume NV   Bosu lunges x 10 b/l 5"    TMill walk   --     NP    Leg Extension  35# 3x10 15# 3x10 SL 30# 3x10 30# 3x10  35 # 3x10    Leg Curl  40# 3x10 15# 3x10 SL 30# 3x10 30# 3x10  40 # 3x10    Hip Adduction  100# 3x10 100# 3x10  90# 3x10 90# 3x10  100#3x10    Hip Abduction   Leg Press   HR on # 3x10  160# 3x10  160# 3x10 100# 3x10   160# 3x10  160# 3x10  90# 3x10  140 3x10  140 3x10  90# 3x10  140# 3x10  140# 3x10  100#3x10  160# 3x10  160# 3x10       Assessment: Tolerated treatment well  Progressed to single leg on the knee extension and knee curl machine  No exacerbations of symptoms this date   Patient exhibited good technique with therapeutic exercises and would benefit from continued PT      Plan: Continue per plan of care  Progress treatment as tolerated

## 2019-01-11 ENCOUNTER — OFFICE VISIT (OUTPATIENT)
Dept: PHYSICAL THERAPY | Facility: CLINIC | Age: 29
End: 2019-01-11
Payer: COMMERCIAL

## 2019-01-11 DIAGNOSIS — M25.561 RIGHT KNEE PAIN, UNSPECIFIED CHRONICITY: ICD-10-CM

## 2019-01-11 DIAGNOSIS — Z87.81 S/P ORIF (OPEN REDUCTION INTERNAL FIXATION) FRACTURE: Primary | ICD-10-CM

## 2019-01-11 DIAGNOSIS — Z98.890 S/P ORIF (OPEN REDUCTION INTERNAL FIXATION) FRACTURE: Primary | ICD-10-CM

## 2019-01-11 PROCEDURE — 97140 MANUAL THERAPY 1/> REGIONS: CPT | Performed by: PHYSICAL THERAPIST

## 2019-01-11 PROCEDURE — 97110 THERAPEUTIC EXERCISES: CPT | Performed by: PHYSICAL THERAPIST

## 2019-01-11 NOTE — PROGRESS NOTES
Daily Note     Today's date: 2019  Patient name: Carolyn Mcpherson  : 1990  MRN: [id number]  Referring provider: Mirna Nails MD  Dx:   Encounter Diagnosis     ICD-10-CM    1  S/P ORIF (open reduction internal fixation) fracture Z96 7     Z87 81    2  Right knee pain, unspecified chronicity M25 561                   Subjective:     "Pt offers no new complaints this session"       Objective: See treatment diary below  Manual     Flexion/ExtensionPROM to right knee  10 min 10 min 10 min  10 min 10 min                                                                 Exercise Diary     Nu Step -- L7 x10' L7 10 min L7 10 min  L7 10'   Quad Set  --  --  NP 10"x10   Hip Add with Sonam Freeze -- --  NP 5"x20   Clamshell -- --  NP Kennedy 5"x20   Bridges  Declined  Pt defers  Defers   defers   SLR 4 way  3x10 2#  Flex, Abd --  3x10  Flex/Abd  2# 2# 3x10 flex and abd   Step Up    --        SLS 3x30" b/l 3x30" b/l        HR/TR  x30 TR x30        Mini Squat   --        Weight Shifting on BD    --         standing hamstring curl   -- DC - machine NP     TKE   --         LAQ/ SAQ  2# 3x10  Bilateral  2# 3x10  Bilateral  3x10 3#  B/L each 3x10 2#  B/L ea  2# 3x10 Bilateral     Lunges Bosu Lunges  10x B/L 5" Bosu 5" 2x10 b/l Resume NV   Bosu lunges x 10 b/l 5"    TMill walk   --     NP    Leg Extension  35# 3x10 15# 3x10 SL 15# 3x10 SL 30# 3x10  35 # 3x10    Leg Curl  40# 3x10 15# 3x10 SL 15# 3x10 SL 30# 3x10  40 # 3x10    Hip Adduction  100# 3x10 100# 3x10  100# 3x10 90# 3x10  100#3x10    Hip Abduction   Leg Press   HR on # 3x10  160# 3x10  160# 3x10 100# 3x10   160# 3x10  160# 3x10  100# 3x10  160# 3x10  160# 3x10  90# 3x10  140# 3x10  140# 3x10  100#3x10  160# 3x10  160# 3x10           Assessment: Tolerated treatment well  Patient exhibited good technique with therapeutic exercises      Plan: Continue per plan of care

## 2019-01-14 ENCOUNTER — OFFICE VISIT (OUTPATIENT)
Dept: PHYSICAL THERAPY | Facility: CLINIC | Age: 29
End: 2019-01-14
Payer: COMMERCIAL

## 2019-01-14 DIAGNOSIS — M25.561 RIGHT KNEE PAIN, UNSPECIFIED CHRONICITY: ICD-10-CM

## 2019-01-14 DIAGNOSIS — Z87.81 S/P ORIF (OPEN REDUCTION INTERNAL FIXATION) FRACTURE: Primary | ICD-10-CM

## 2019-01-14 DIAGNOSIS — Z98.890 S/P ORIF (OPEN REDUCTION INTERNAL FIXATION) FRACTURE: Primary | ICD-10-CM

## 2019-01-14 PROCEDURE — 97110 THERAPEUTIC EXERCISES: CPT | Performed by: PHYSICAL THERAPIST

## 2019-01-14 PROCEDURE — 97140 MANUAL THERAPY 1/> REGIONS: CPT | Performed by: PHYSICAL THERAPIST

## 2019-01-14 NOTE — PROGRESS NOTES
Daily Note     Today's date: 2019  Patient name: Halina Sepulveda  : 1990  MRN: [id number]  Referring provider: Gregg Gilbert MD  Dx:   Encounter Diagnosis     ICD-10-CM    1  S/P ORIF (open reduction internal fixation) fracture Z96 7     Z87 81    2  Right knee pain, unspecified chronicity M25 561                   Subjective: "My knee feels funny  Just ever since the surgery it feels funny  It's feeling good though considering how much I've been working "      Objective: See treatment diary below  Manual     Flexion/ExtensionPROM to right knee  10 min 10 min 10 min  10 min 10 min                                                                 Exercise Diary     Nu Step -- L7 x10' L7 10 min L7 10 min  L7 10'   SLR 4 way  3x10 2#  Flex, Abd --  3x10  Flex/Abd  3# 2# 3x10 flex and abd   Step Up    --        SLS 3x30" b/l 3x30" b/l        HR/TR  x30 TR x30         LAQ/ SAQ  2# 3x10  Bilateral  2# 3x10  Bilateral  3x10 3#  B/L each 3x10 3#  B/L ea  2# 3x10 Bilateral     Lunges Bosu Lunges  10x B/L 5" Bosu 5" 2x10 b/l Resume NV   Bosu lunges x 10 b/l 5"    TMill walk   --     NP    Leg Extension  35# 3x10 15# 3x10 SL 15# 3x10 SL 10# 3x10 SL  35 # 3x10    Leg Curl  40# 3x10 15# 3x10 SL 15# 3x10 SL 15# 3x10 SL  40 # 3x10    Hip Adduction  100# 3x10 100# 3x10  100# 3x10 140# 3x10  100#3x10    Hip Abduction   Leg Press   HR on # 3x10  160# 3x10  160# 3x10 100# 3x10   160# 3x10  160# 3x10  100# 3x10  160# 3x10  160# 3x10  140# 3x10  160# 3x10  160# 3x10  100#3x10  160# 3x10  160# 3x10       Assessment: Tolerated treatment well  Patient demonstrated fatigue post treatment, exhibited good technique with therapeutic exercises and would benefit from continued PT       Plan: Continue per plan of care  Progress treatment as tolerated

## 2019-01-16 ENCOUNTER — OFFICE VISIT (OUTPATIENT)
Dept: PHYSICAL THERAPY | Facility: CLINIC | Age: 29
End: 2019-01-16
Payer: COMMERCIAL

## 2019-01-16 DIAGNOSIS — M25.561 RIGHT KNEE PAIN, UNSPECIFIED CHRONICITY: ICD-10-CM

## 2019-01-16 DIAGNOSIS — Z98.890 S/P ORIF (OPEN REDUCTION INTERNAL FIXATION) FRACTURE: Primary | ICD-10-CM

## 2019-01-16 DIAGNOSIS — Z87.81 S/P ORIF (OPEN REDUCTION INTERNAL FIXATION) FRACTURE: Primary | ICD-10-CM

## 2019-01-16 PROCEDURE — 97110 THERAPEUTIC EXERCISES: CPT | Performed by: PHYSICAL THERAPIST

## 2019-01-16 PROCEDURE — 97140 MANUAL THERAPY 1/> REGIONS: CPT | Performed by: PHYSICAL THERAPIST

## 2019-01-16 NOTE — PROGRESS NOTES
Daily Note     Today's date: 2019  Patient name: Guy Ramirez  : 1990  MRN: [id number]  Referring provider: Paresh Beaulieu MD  Dx:   Encounter Diagnosis     ICD-10-CM    1  S/P ORIF (open reduction internal fixation) fracture Z96 7     Z87 81    2  Right knee pain, unspecified chronicity M25 561                   Subjective:  "Pt offers no new complaints this session "       Objective: See treatment diary below  Manual      Flexion/ExtensionPROM to right knee  10 min 10 min 10 min  10 min 10 min                                                                 Exercise Diary     Nu Step -- L7 x10' L7 10 min L7 10 min  L7 10'   SLR 4 way  3x10 2#  Flex, Abd --  3x10  Flex/Abd  3# 3# 3x10 flex and abd   Step Up    --        SLS 3x30" b/l 3x30" b/l        HR/TR  x30 TR x30         LAQ/ SAQ  2# 3x10  Bilateral  2# 3x10  Bilateral  3x10 3#  B/L each 3x10 3#  B/L ea  2# 3x10 Bilateral     Lunges Bosu Lunges  10x B/L 5" Bosu 5" 2x10 b/l Resume NV   Bosu lunges x 10 b/l 5"    TMill walk   --     NP    Leg Extension  35# 3x10 15# 3x10 SL 15# 3x10 SL 10# 3x10 SL  15# # 3x10 SL    Leg Curl  40# 3x10 15# 3x10 SL 15# 3x10 SL 15# 3x10 SL  15# # 3x10 SL    Hip Adduction  100# 3x10 100# 3x10  100# 3x10 140# 3x10  140#3x10    Hip Abduction   Leg Press   HR on # 3x10  160# 3x10  160# 3x10 100# 3x10   160# 3x10  160# 3x10  100# 3x10  160# 3x10  160# 3x10  140# 3x10  160# 3x10  160# 3x10  140#3x10  160# 3x10  160# 3x10           Assessment: Tolerated treatment well  Patient exhibited good technique with therapeutic exercises      Plan: Continue per plan of care

## 2019-01-17 ENCOUNTER — APPOINTMENT (OUTPATIENT)
Dept: PHYSICAL THERAPY | Facility: CLINIC | Age: 29
End: 2019-01-17
Payer: COMMERCIAL

## 2019-01-22 ENCOUNTER — OFFICE VISIT (OUTPATIENT)
Dept: PHYSICAL THERAPY | Facility: CLINIC | Age: 29
End: 2019-01-22
Payer: COMMERCIAL

## 2019-01-22 DIAGNOSIS — M25.561 RIGHT KNEE PAIN, UNSPECIFIED CHRONICITY: ICD-10-CM

## 2019-01-22 DIAGNOSIS — Z87.81 S/P ORIF (OPEN REDUCTION INTERNAL FIXATION) FRACTURE: Primary | ICD-10-CM

## 2019-01-22 DIAGNOSIS — Z98.890 S/P ORIF (OPEN REDUCTION INTERNAL FIXATION) FRACTURE: Primary | ICD-10-CM

## 2019-01-22 PROCEDURE — 97140 MANUAL THERAPY 1/> REGIONS: CPT | Performed by: PHYSICAL THERAPIST

## 2019-01-22 PROCEDURE — 97110 THERAPEUTIC EXERCISES: CPT | Performed by: PHYSICAL THERAPIST

## 2019-01-22 NOTE — PROGRESS NOTES
Daily Note     Today's date: 2019  Patient name: Chandra Parker  : 1990  MRN: [id number]  Referring provider: Brody Wallace MD  Dx:   Encounter Diagnosis     ICD-10-CM    1  S/P ORIF (open reduction internal fixation) fracture Z96 7     Z87 81    2  Right knee pain, unspecified chronicity M25 561                   Subjective: Patient reports her knee is "not too bad today " C/o a migraine and it being "too cold to do anything "      Objective: See treatment diary below  Precautions: S/P ORIF right tibial plateau fx  DOS 18   Manual      Flexion/ExtensionPROM to right knee  10 min 10 min 10 min  10 min 10 min                                                                 Exercise Diary     Nu Step L7 x10' L7 x10' L7 10 min L7 10 min  L7 10'   SLR 4 way  3x10, 3#  Flex, Abd --  3x10  Flex/Abd  3# 3# 3x10 flex and abd   Step Up    --        SLS  3x30" b/l        HR/TR  DC to machine x30         LAQ/ SAQ  DC to machine 2# 3x10  Bilateral  3x10 3#  B/L each 3x10 3#  B/L ea  2# 3x10 Bilateral     Lunges  Bosu 5" 2x10 b/l Resume NV   Bosu lunges x 10 b/l 5"    TMill walk   --     NP    Leg Extension  10# 3x10 15# 3x10 SL 15# 3x10 SL 10# 3x10 SL  15# # 3x10 SL    Leg Curl  15# 3x10 15# 3x10 SL 15# 3x10 SL 15# 3x10 SL  15# # 3x10 SL    Hip Adduction  150# 3x10 100# 3x10  100# 3x10 140# 3x10  140#3x10    Hip Abduction   Leg Press   HR on # 3x10  180# 3x10  180# 3x10 100# 3x10   160# 3x10  160# 3x10  100# 3x10  160# 3x10  160# 3x10  140# 3x10  160# 3x10  160# 3x10  140#3x10  160# 3x10  160# 3x10         Assessment: Tolerated treatment well  Able to progress weight on leg press and hip abd/add machines this date  Patient demonstrated fatigue post treatment, exhibited good technique with therapeutic exercises and would benefit from continued PT      Plan: Continue per plan of care  Progress treatment as tolerated

## 2019-01-25 ENCOUNTER — OFFICE VISIT (OUTPATIENT)
Dept: PHYSICAL THERAPY | Facility: CLINIC | Age: 29
End: 2019-01-25
Payer: COMMERCIAL

## 2019-01-25 DIAGNOSIS — Z87.81 S/P ORIF (OPEN REDUCTION INTERNAL FIXATION) FRACTURE: Primary | ICD-10-CM

## 2019-01-25 DIAGNOSIS — Z98.890 S/P ORIF (OPEN REDUCTION INTERNAL FIXATION) FRACTURE: Primary | ICD-10-CM

## 2019-01-25 DIAGNOSIS — M25.561 RIGHT KNEE PAIN, UNSPECIFIED CHRONICITY: ICD-10-CM

## 2019-01-25 PROCEDURE — 97110 THERAPEUTIC EXERCISES: CPT

## 2019-01-25 PROCEDURE — 97140 MANUAL THERAPY 1/> REGIONS: CPT

## 2019-01-25 NOTE — PROGRESS NOTES
Daily Note     Today's date: 2019  Patient name: Cheri Pierce  : 1990  MRN: [id number]  Referring provider: Yanci Weeks MD  Dx:   Encounter Diagnosis     ICD-10-CM    1  S/P ORIF (open reduction internal fixation) fracture Z96 7     Z87 81    2  Right knee pain, unspecified chronicity M25 561                   Subjective: Pt  reports she still has "pinching" in her knee that won't go away  Objective: See treatment diary below  Precautions: S/P ORIF right tibial plateau fx  DOS 18   Manual      Flexion/ExtensionPROM to right knee  10 min 10 min 10 min  10 min 10 min                                                                 Exercise Diary     Nu Step L7 x10' L7 x10' L7 10 min L7 10 min  L7 10'   SLR 4 way  3x10, 3#  Flex, Abd 2x10 4#  Flex/Abd  3x10  Flex/Abd  3# 3# 3x10 flex and abd   Step Up    --        SLS          HR/TR  DC to machine          LAQ/ SAQ  DC to machine  3x10 3#  B/L each 3x10 3#  B/L ea  2# 3x10 Bilateral     Lunges   Resume NV   Bosu lunges x 10 b/l 5"    TMill walk   --     NP    Leg Extension  10# 3x10 15# 3x10 SL 15# 3x10 SL 10# 3x10 SL  15# # 3x10 SL    Leg Curl  15# 3x10 15# 3x10 SL 15# 3x10 SL 15# 3x10 SL  15# # 3x10 SL    Hip Adduction  150# 3x10 150# 3x10  100# 3x10 140# 3x10  140#3x10    Hip Abduction   Leg Press   HR on # 3x10  180# 3x10  180# 3x10 150# 3x10   180# 3x10  180# 3x10  100# 3x10  160# 3x10  160# 3x10  140# 3x10  160# 3x10  160# 3x10  140#3x10  160# 3x10  160# 3x10             Assessment: Tolerated treatment well  Patient exhibited good technique with therapeutic exercises and would benefit from continued PT  Pt  without adverse effect with addition of 4# for SLR  Trialed ice today following therapy session  Plan: Progress treatment as tolerated

## 2019-01-28 ENCOUNTER — APPOINTMENT (OUTPATIENT)
Dept: PHYSICAL THERAPY | Facility: CLINIC | Age: 29
End: 2019-01-28
Payer: COMMERCIAL

## 2019-01-30 ENCOUNTER — OFFICE VISIT (OUTPATIENT)
Dept: PHYSICAL THERAPY | Facility: CLINIC | Age: 29
End: 2019-01-30
Payer: COMMERCIAL

## 2019-01-30 DIAGNOSIS — Z87.81 S/P ORIF (OPEN REDUCTION INTERNAL FIXATION) FRACTURE: Primary | ICD-10-CM

## 2019-01-30 DIAGNOSIS — Z98.890 S/P ORIF (OPEN REDUCTION INTERNAL FIXATION) FRACTURE: Primary | ICD-10-CM

## 2019-01-30 DIAGNOSIS — M25.561 RIGHT KNEE PAIN, UNSPECIFIED CHRONICITY: ICD-10-CM

## 2019-01-30 PROCEDURE — 97110 THERAPEUTIC EXERCISES: CPT | Performed by: PHYSICAL THERAPIST

## 2019-01-30 NOTE — PROGRESS NOTES
Daily Note     Today's date: 2019  Patient name: Rudi Wilson  : 1990  MRN: [id number]  Referring provider: Lauren Min MD  Dx:   Encounter Diagnosis     ICD-10-CM    1  S/P ORIF (open reduction internal fixation) fracture Z96 7     Z87 81    2  Right knee pain, unspecified chronicity M25 561        Start Time: 0850  Stop Time: 0955  Total time in clinic (min): 65 minutes    Subjective: Pt reports her ankle is a little sore today, but otherwise, no new complaints noted at this time  Objective: See treatment diary below    Precautions: S/P ORIF right tibial plateau fx  DOS 18   Manual      Flexion/ExtensionPROM to right knee  10 min 10 min 10 min  10 min 10 min                                                                 Exercise Diary     Nu Step L7 x10' L7 x10' L7 10 min L7 10 min  L7 10'   SLR 4 way  3x10, 3#  Flex, Abd 2x10 4#  Flex/Abd 2x10 4#  Flex/Abd 3x10  Flex/Abd  3# 3# 3x10 flex and abd   Step Up    --        SLS            HR/TR  DC to machine           LAQ/ SAQ  DC to machine    3x10 3#  B/L ea  2# 3x10 Bilateral     Lunges         Bosu lunges x 10 b/l 5"    TMill walk   --     NP    Leg Extension  10# 3x10 15# 3x10 SL 15# 3x10 S/L 10# 3x10 SL  15# # 3x10 SL    Leg Curl  15# 3x10 15# 3x10 SL 25# 3x10 S/L 15# 3x10 SL  15# # 3x10 SL    Hip Adduction  150# 3x10 150# 3x10  150# 3x10 140# 3x10  140#3x10    Hip Abduction   Leg Press   HR on # 3x10  180# 3x10  180# 3x10 150# 3x10   180# 3x10  180# 3x10  150# 3x10  180# 3x10  180# 3x10 140# 3x10  160# 3x10  160# 3x10  140#3x10  160# 3x10  160# 3x10   S/L Leg Press   80# 3x10     Monster Walk & S/S and Squat   1x ea 40 ft  Blue TB           Assessment: Tolerated treatment well  Patient exhibited good technique with therapeutic exercises and would benefit from continued PT  Pt with good tolerance to exercises this visit  Progressed exercises as tolerated by the patient   Added S/L leg press, monster walk, and side step and squat with good tolerance from the patient  Reported minor fatigue post tx consistent with exercises performed  Plan: Continue per plan of care  Progress treatment as tolerated

## 2019-01-31 ENCOUNTER — OFFICE VISIT (OUTPATIENT)
Dept: PHYSICAL THERAPY | Facility: CLINIC | Age: 29
End: 2019-01-31
Payer: COMMERCIAL

## 2019-01-31 DIAGNOSIS — Z87.81 S/P ORIF (OPEN REDUCTION INTERNAL FIXATION) FRACTURE: Primary | ICD-10-CM

## 2019-01-31 DIAGNOSIS — Z98.890 S/P ORIF (OPEN REDUCTION INTERNAL FIXATION) FRACTURE: Primary | ICD-10-CM

## 2019-01-31 DIAGNOSIS — M25.561 RIGHT KNEE PAIN, UNSPECIFIED CHRONICITY: ICD-10-CM

## 2019-01-31 PROCEDURE — 97110 THERAPEUTIC EXERCISES: CPT

## 2019-01-31 PROCEDURE — 97140 MANUAL THERAPY 1/> REGIONS: CPT

## 2019-01-31 NOTE — PROGRESS NOTES
Daily Note     Today's date: 2019  Patient name: Trupti Dale  : 1990  MRN: [id number]  Referring provider: Vincenzo Schreiber MD  Dx:   Encounter Diagnosis     ICD-10-CM    1  S/P ORIF (open reduction internal fixation) fracture Z96 7     Z87 81    2  Right knee pain, unspecified chronicity M25 561                   Subjective: "I am so tired from working  I've works so much this week "      Objective: See treatment diary below  Precautions: S/P ORIF right tibial plateau fx  DOS 18   Manual      Flexion/ExtensionPROM to right knee  10 min 10 min 10 min  10 min 10 min                                              CP post Tx        10'           Exercise Diary     Nu Step L7 x10' L7 x10' L7 10 min L7 10 min  L7 10'   SLR 4 way  3x10, 3#  Flex, Abd 2x10 4#  Flex/Abd 2x10 4#  Flex/Abd 3x10  Flex/Abd  4# 3# 3x10 flex and abd   Step Up    --        SLS            HR/TR  DC to machine           LAQ/ SAQ  DC to machine      2# 3x10 Bilateral     Lunges         Bosu lunges x 10 b/l 5"    TMill walk   --     NP    Leg Extension  10# 3x10 15# 3x10 SL 15# 3x10 S/L 15# 3x10 SL  15# # 3x10 SL    Leg Curl  15# 3x10 15# 3x10 SL 25# 3x10 S/L 25# 3x10 SL  15# # 3x10 SL    Hip Adduction  150# 3x10 150# 3x10  150# 3x10 150# 3x10  140#3x10    Hip Abduction   Leg Press   HR on # 3x10  180# 3x10  180# 3x10 150# 3x10   180# 3x10  180# 3x10  150# 3x10  180# 3x10  180# 3x10 150# 3x10  180# 3x10  180# 3x10  140#3x10  160# 3x10  160# 3x10   S/L Leg Press   80# 3x10 80# 3x10    Monster Walk & S/S and Squat   1x ea 40 ft  Blue TB 1x ea 40 ft  Blue TB        Assessment: Tolerated treatment well  Pt independent in the gym this date  Patient demonstrated fatigue post treatment and exhibited good technique with therapeutic exercises  No adverse affect to CP this date  Plan: Continue per plan of care

## 2019-02-04 ENCOUNTER — OFFICE VISIT (OUTPATIENT)
Dept: PHYSICAL THERAPY | Facility: CLINIC | Age: 29
End: 2019-02-04
Payer: COMMERCIAL

## 2019-02-04 DIAGNOSIS — M25.561 RIGHT KNEE PAIN, UNSPECIFIED CHRONICITY: ICD-10-CM

## 2019-02-04 DIAGNOSIS — Z98.890 S/P ORIF (OPEN REDUCTION INTERNAL FIXATION) FRACTURE: Primary | ICD-10-CM

## 2019-02-04 DIAGNOSIS — Z87.81 S/P ORIF (OPEN REDUCTION INTERNAL FIXATION) FRACTURE: Primary | ICD-10-CM

## 2019-02-04 PROCEDURE — 97110 THERAPEUTIC EXERCISES: CPT | Performed by: PHYSICAL THERAPIST

## 2019-02-04 PROCEDURE — 97140 MANUAL THERAPY 1/> REGIONS: CPT | Performed by: PHYSICAL THERAPIST

## 2019-02-04 NOTE — PROGRESS NOTES
Daily Note     Today's date: 2019  Patient name: Sarabjit Pierre  : 1990  MRN: [id number]  Referring provider: Juliocesar Donald MD  Dx:   Encounter Diagnosis     ICD-10-CM    1  S/P ORIF (open reduction internal fixation) fracture Z96 7     Z87 81    2  Right knee pain, unspecified chronicity M25 561                   Subjective: "It's pretty good today "      Objective: See treatment diary below  Precautions: S/P ORIF right tibial plateau fx  DOS 18   Manual   2/4   Flexion/ExtensionPROM to right knee  10 min 10 min 10 min  10 min 10 min                                              CP post Tx        10'  x10 mins         Exercise Diary   2   Nu Step L7 x10' L7 x10' L7 10 min L7 10 min Upright bike  x5 mins   SLR 4 way  3x10, 3#  Flex, Abd 2x10 4#  Flex/Abd 2x10 4#  Flex/Abd 3x10  Flex/Abd  4# 4# 3x10 flex and abd   Step Up    --        SLS             Lunges            TMill walk   --    2% incline  x10 mins    Leg Extension  10# 3x10 15# 3x10 SL 15# 3x10 S/L 15# 3x10 SL  15# # 3x10 SL    Leg Curl  15# 3x10 15# 3x10 SL 25# 3x10 S/L 25# 3x10 SL Deferred after bike    Hip Adduction  150# 3x10 150# 3x10  150# 3x10 150# 3x10 150#3x10    Hip Abduction   Leg Press   HR on # 3x10  180# 3x10  180# 3x10 150# 3x10   180# 3x10  180# 3x10  150# 3x10  180# 3x10  180# 3x10 150# 3x10  180# 3x10  180# 3x10 150#3x10  180# 3x10  180# 3x10   S/L Leg Press   80# 3x10 80# 3x10 80# 3x10   Monster Walk & S/S and Squat   1x ea 40 ft  Blue TB 1x ea 40 ft  Blue TB Resume NV       Assessment: Tolerated treatment well  Added upright bike, however patient reported "pinching" in the knee therefore only tolerated 5 mins of this activity  Patient demonstrated fatigue post treatment, exhibited good technique with therapeutic exercises and would benefit from continued PT      Plan: Continue per plan of care  Progress treatment as tolerated

## 2019-02-05 ENCOUNTER — TRANSCRIBE ORDERS (OUTPATIENT)
Dept: PHYSICAL THERAPY | Facility: CLINIC | Age: 29
End: 2019-02-05

## 2019-02-05 ENCOUNTER — EVALUATION (OUTPATIENT)
Dept: PHYSICAL THERAPY | Facility: CLINIC | Age: 29
End: 2019-02-05
Payer: COMMERCIAL

## 2019-02-05 DIAGNOSIS — M25.561 RIGHT KNEE PAIN, UNSPECIFIED CHRONICITY: ICD-10-CM

## 2019-02-05 DIAGNOSIS — Z98.890 S/P ORIF (OPEN REDUCTION INTERNAL FIXATION) FRACTURE: Primary | ICD-10-CM

## 2019-02-05 DIAGNOSIS — M25.561 ACUTE PAIN OF RIGHT KNEE: ICD-10-CM

## 2019-02-05 DIAGNOSIS — Z87.81 S/P ORIF (OPEN REDUCTION INTERNAL FIXATION) FRACTURE: Primary | ICD-10-CM

## 2019-02-05 PROCEDURE — 97110 THERAPEUTIC EXERCISES: CPT | Performed by: PHYSICAL THERAPIST

## 2019-02-05 PROCEDURE — 97140 MANUAL THERAPY 1/> REGIONS: CPT | Performed by: PHYSICAL THERAPIST

## 2019-02-05 PROCEDURE — 97164 PT RE-EVAL EST PLAN CARE: CPT | Performed by: PHYSICAL THERAPIST
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
## 2019-02-07 ENCOUNTER — OFFICE VISIT (OUTPATIENT)
Dept: PHYSICAL THERAPY | Facility: CLINIC | Age: 29
End: 2019-02-07
Payer: COMMERCIAL

## 2019-02-07 DIAGNOSIS — M25.561 RIGHT KNEE PAIN, UNSPECIFIED CHRONICITY: ICD-10-CM

## 2019-02-07 DIAGNOSIS — Z98.890 S/P ORIF (OPEN REDUCTION INTERNAL FIXATION) FRACTURE: Primary | ICD-10-CM

## 2019-02-07 DIAGNOSIS — Z87.81 S/P ORIF (OPEN REDUCTION INTERNAL FIXATION) FRACTURE: Primary | ICD-10-CM

## 2019-02-07 PROCEDURE — 97140 MANUAL THERAPY 1/> REGIONS: CPT | Performed by: PHYSICAL THERAPIST

## 2019-02-07 PROCEDURE — 97110 THERAPEUTIC EXERCISES: CPT | Performed by: PHYSICAL THERAPIST

## 2019-02-07 NOTE — PROGRESS NOTES
Daily Note     Today's date: 2019  Patient name: Drew Bautista  : 1990  MRN: [id number]  Referring provider: Lucio Barber MD  Dx:   Encounter Diagnosis     ICD-10-CM    1  S/P ORIF (open reduction internal fixation) fracture Z96 7     Z87 81    2  Right knee pain, unspecified chronicity M25 561                   Subjective: " I feel pretty good, today is my last day "       Objective: See treatment diary below  Manual   2/4   Flexion/ExtensionPROM to right knee  10 min 10 min 10 min  10 min 10 min                                              CP post Tx        10'  x10 mins         Exercise Diary   2/4   Nu Step L7 x10' L7 x10' L7 10 min L7 10 min Upright bike  x5 mins   SLR 4 way   2x10 4#  Flex/Abd 2x10 4#  Flex/Abd 3x10  Flex/Abd  4# 4# 3x10 flex and abd   Step Up    --        SLS             Lunges            TMill walk   --    2% incline  x10 mins    Leg Extension  10# 3x10 15# 3x10 SL 15# 3x10 S/L 15# 3x10 SL  15# # 3x10 SL    Leg Curl  15# 3x10 15# 3x10 SL 25# 3x10 S/L 25# 3x10 SL Deferred after bike    Hip Adduction  150# 3x10 150# 3x10  150# 3x10 150# 3x10 150#3x10    Hip Abduction   Leg Press   HR on # 3x10  180# 3x10  180# 3x10 150# 3x10   180# 3x10  180# 3x10  150# 3x10  180# 3x10  180# 3x10 150# 3x10  180# 3x10  180# 3x10 150#3x10  180# 3x10  180# 3x10   S/L Leg Press 80# 3x10 80# 3x10  80# 3x10 80# 3x10 80# 3x10   Monster Walk & S/S and Squat   1x ea 40 ft  Blue TB 1x ea 40 ft  Blue TB Resume NV       Assessment: Tolerated treatment well  Patient exhibited good technique with therapeutic exercises  At this time, patient has achieved their maximum functional benefit from skilled physical therapy services and will be discharged to their [facility]  Patient is in agreement with the plan of care  As a result, patient is discharged from physical therapy        Plan: Continue per plan of care    DC PT

## 2019-02-25 DIAGNOSIS — E78.00 PURE HYPERCHOLESTEROLEMIA: ICD-10-CM

## 2019-02-25 DIAGNOSIS — E55.9 VITAMIN D DEFICIENCY: ICD-10-CM

## 2019-02-25 DIAGNOSIS — I10 ESSENTIAL HYPERTENSION: ICD-10-CM

## 2019-02-25 RX ORDER — ATORVASTATIN CALCIUM 20 MG/1
20 TABLET, FILM COATED ORAL DAILY
Qty: 30 TABLET | Refills: 5 | Status: SHIPPED | OUTPATIENT
Start: 2019-02-25 | End: 2019-09-12 | Stop reason: SDUPTHER

## 2019-02-25 RX ORDER — ERGOCALCIFEROL 1.25 MG/1
50000 CAPSULE ORAL WEEKLY
Qty: 4 CAPSULE | Refills: 4 | Status: SHIPPED | OUTPATIENT
Start: 2019-02-25 | End: 2019-09-12 | Stop reason: SDUPTHER

## 2019-02-25 RX ORDER — PROPRANOLOL HYDROCHLORIDE 20 MG/1
20 TABLET ORAL EVERY 12 HOURS SCHEDULED
Qty: 60 TABLET | Refills: 4 | Status: SHIPPED | OUTPATIENT
Start: 2019-02-25 | End: 2019-09-12 | Stop reason: SDUPTHER

## 2019-02-25 NOTE — TELEPHONE ENCOUNTER
Refill Propranolol, Vit - D, Atorvastatin  Has appt scheduled 3/18/19      [facility]

## 2019-03-18 ENCOUNTER — OFFICE VISIT (OUTPATIENT)
Dept: FAMILY MEDICINE CLINIC | Facility: CLINIC | Age: 29
End: 2019-03-18
Payer: COMMERCIAL

## 2019-03-18 VITALS
RESPIRATION RATE: 20 BRPM | TEMPERATURE: 97.1 F | HEART RATE: 72 BPM | DIASTOLIC BLOOD PRESSURE: 84 MMHG | HEIGHT: 64 IN | BODY MASS INDEX: 50.02 KG/M2 | SYSTOLIC BLOOD PRESSURE: 124 MMHG | WEIGHT: 293 LBS

## 2019-03-18 DIAGNOSIS — S82.141A TIBIAL PLATEAU FRACTURE, RIGHT, CLOSED, INITIAL ENCOUNTER: ICD-10-CM

## 2019-03-18 DIAGNOSIS — S32.009D CLOSED FRACTURE OF TRANSVERSE PROCESS OF LUMBAR VERTEBRA WITH ROUTINE HEALING, SUBSEQUENT ENCOUNTER: ICD-10-CM

## 2019-03-18 DIAGNOSIS — I10 ESSENTIAL HYPERTENSION: Primary | ICD-10-CM

## 2019-03-18 PROCEDURE — 3008F BODY MASS INDEX DOCD: CPT | Performed by: PHYSICIAN ASSISTANT

## 2019-03-18 PROCEDURE — 3079F DIAST BP 80-89 MM HG: CPT | Performed by: PHYSICIAN ASSISTANT

## 2019-03-18 PROCEDURE — 1036F TOBACCO NON-USER: CPT | Performed by: PHYSICIAN ASSISTANT

## 2019-03-18 PROCEDURE — 99213 OFFICE O/P EST LOW 20 MIN: CPT | Performed by: PHYSICIAN ASSISTANT

## 2019-03-18 PROCEDURE — 3074F SYST BP LT 130 MM HG: CPT | Performed by: PHYSICIAN ASSISTANT
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
## 2019-03-18 NOTE — ASSESSMENT & PLAN NOTE
Pt has healed nicely  Information given if she wants to pursue medical marijuana  Aquatic therapy form completed

## 2019-05-17 ENCOUNTER — OFFICE VISIT (OUTPATIENT)
Dept: FAMILY MEDICINE CLINIC | Facility: CLINIC | Age: 29
End: 2019-05-17
Payer: COMMERCIAL

## 2019-05-17 VITALS
WEIGHT: 293 LBS | RESPIRATION RATE: 16 BRPM | HEIGHT: 64 IN | DIASTOLIC BLOOD PRESSURE: 80 MMHG | SYSTOLIC BLOOD PRESSURE: 120 MMHG | TEMPERATURE: 97.9 F | BODY MASS INDEX: 50.02 KG/M2 | HEART RATE: 82 BPM

## 2019-05-17 DIAGNOSIS — E66.01 CLASS 3 SEVERE OBESITY DUE TO EXCESS CALORIES WITHOUT SERIOUS COMORBIDITY WITH BODY MASS INDEX (BMI) OF 50.0 TO 59.9 IN ADULT (HCC): Primary | ICD-10-CM

## 2019-05-17 PROBLEM — S60.511A: Status: RESOLVED | Noted: 2018-04-29 | Resolved: 2019-05-17

## 2019-05-17 PROBLEM — E66.813 CLASS 3 SEVERE OBESITY DUE TO EXCESS CALORIES WITHOUT SERIOUS COMORBIDITY WITH BODY MASS INDEX (BMI) OF 50.0 TO 59.9 IN ADULT (HCC): Status: ACTIVE | Noted: 2019-05-17

## 2019-05-17 PROCEDURE — 1036F TOBACCO NON-USER: CPT | Performed by: PHYSICIAN ASSISTANT

## 2019-05-17 PROCEDURE — 99213 OFFICE O/P EST LOW 20 MIN: CPT | Performed by: PHYSICIAN ASSISTANT

## 2019-05-17 PROCEDURE — 3008F BODY MASS INDEX DOCD: CPT | Performed by: PHYSICIAN ASSISTANT

## 2019-09-12 DIAGNOSIS — E55.9 VITAMIN D DEFICIENCY: ICD-10-CM

## 2019-09-12 DIAGNOSIS — I10 ESSENTIAL HYPERTENSION: ICD-10-CM

## 2019-09-12 DIAGNOSIS — E78.00 PURE HYPERCHOLESTEROLEMIA: ICD-10-CM

## 2019-09-12 RX ORDER — ERGOCALCIFEROL 1.25 MG/1
50000 CAPSULE ORAL WEEKLY
Qty: 12 CAPSULE | Refills: 1 | Status: SHIPPED | OUTPATIENT
Start: 2019-09-12 | End: 2022-01-11

## 2019-09-12 RX ORDER — PROPRANOLOL HYDROCHLORIDE 20 MG/1
20 TABLET ORAL EVERY 12 HOURS SCHEDULED
Qty: 180 TABLET | Refills: 1 | Status: SHIPPED | OUTPATIENT
Start: 2019-09-12 | End: 2020-03-25

## 2019-09-12 RX ORDER — ATORVASTATIN CALCIUM 20 MG/1
20 TABLET, FILM COATED ORAL DAILY
Qty: 90 TABLET | Refills: 1 | Status: SHIPPED | OUTPATIENT
Start: 2019-09-12 | End: 2020-03-25

## 2019-09-21 ENCOUNTER — OFFICE VISIT (OUTPATIENT)
Dept: INTERNAL MEDICINE CLINIC | Facility: CLINIC | Age: 29
End: 2019-09-21
Payer: COMMERCIAL

## 2019-09-21 VITALS
TEMPERATURE: 97.2 F | DIASTOLIC BLOOD PRESSURE: 84 MMHG | BODY MASS INDEX: 50.02 KG/M2 | OXYGEN SATURATION: 98 % | HEART RATE: 76 BPM | WEIGHT: 293 LBS | RESPIRATION RATE: 18 BRPM | HEIGHT: 64 IN | SYSTOLIC BLOOD PRESSURE: 122 MMHG

## 2019-09-21 DIAGNOSIS — Z13.1 SCREENING FOR DIABETES MELLITUS: ICD-10-CM

## 2019-09-21 DIAGNOSIS — M79.9 SOFT TISSUE DISORDER: Primary | ICD-10-CM

## 2019-09-21 DIAGNOSIS — E78.00 PURE HYPERCHOLESTEROLEMIA: ICD-10-CM

## 2019-09-21 DIAGNOSIS — Z13.0 SCREENING FOR DEFICIENCY ANEMIA: ICD-10-CM

## 2019-09-21 DIAGNOSIS — Z13.29 SCREENING FOR THYROID DISORDER: ICD-10-CM

## 2019-09-21 DIAGNOSIS — E55.9 VITAMIN D DEFICIENCY: ICD-10-CM

## 2019-09-21 DIAGNOSIS — I10 ESSENTIAL HYPERTENSION: ICD-10-CM

## 2019-09-21 PROBLEM — S82.141A TIBIAL PLATEAU FRACTURE, RIGHT, CLOSED, INITIAL ENCOUNTER: Status: RESOLVED | Noted: 2018-04-29 | Resolved: 2019-09-21

## 2019-09-21 PROBLEM — S82.401A FRACTURE OF RIGHT FIBULA: Status: RESOLVED | Noted: 2018-04-29 | Resolved: 2019-09-21

## 2019-09-21 PROBLEM — S92.402A FRACTURE OF GREAT TOE, LEFT, CLOSED: Status: RESOLVED | Noted: 2018-04-29 | Resolved: 2019-09-21

## 2019-09-21 PROBLEM — S32.009A LUMBAR TRANSVERSE PROCESS FRACTURE (HCC): Status: RESOLVED | Noted: 2018-04-29 | Resolved: 2019-09-21
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
## 2019-09-21 PROBLEM — S22.49XA MULTIPLE FRACTURES OF RIBS: Status: RESOLVED | Noted: 2018-04-29 | Resolved: 2019-09-21

## 2019-09-21 PROCEDURE — 3079F DIAST BP 80-89 MM HG: CPT | Performed by: PHYSICIAN ASSISTANT

## 2019-09-21 PROCEDURE — 3074F SYST BP LT 130 MM HG: CPT | Performed by: PHYSICIAN ASSISTANT

## 2019-09-21 PROCEDURE — 99213 OFFICE O/P EST LOW 20 MIN: CPT | Performed by: PHYSICIAN ASSISTANT

## 2019-09-21 PROCEDURE — 3008F BODY MASS INDEX DOCD: CPT | Performed by: PHYSICIAN ASSISTANT

## 2019-09-21 RX ORDER — FERROUS SULFATE 325(65) MG
325 TABLET ORAL
COMMUNITY
End: 2021-08-13
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
## 2019-09-21 NOTE — ASSESSMENT & PLAN NOTE
No discreet mass along right clavicle  No adenopathy  Offered ultrasound but prefers to watch and wait at this time

## 2019-09-27 ENCOUNTER — TRANSCRIBE ORDERS (OUTPATIENT)
Dept: LAB | Facility: CLINIC | Age: 29
End: 2019-09-27

## 2019-09-27 ENCOUNTER — APPOINTMENT (OUTPATIENT)
Dept: LAB | Facility: CLINIC | Age: 29
End: 2019-09-27
Payer: COMMERCIAL

## 2019-09-27 LAB
25(OH)D3 SERPL-MCNC: 69.4 NG/ML (ref 30–100)
ALBUMIN SERPL BCP-MCNC: 3.8 G/DL (ref 3.5–5)
ALP SERPL-CCNC: 102 U/L (ref 46–116)
ALT SERPL W P-5'-P-CCNC: 30 U/L (ref 12–78)
ANION GAP SERPL CALCULATED.3IONS-SCNC: 5 MMOL/L (ref 4–13)
AST SERPL W P-5'-P-CCNC: 16 U/L (ref 5–45)
BASOPHILS # BLD AUTO: 0.05 THOUSANDS/ΜL (ref 0–0.1)
BASOPHILS NFR BLD AUTO: 0 % (ref 0–1)
BILIRUB SERPL-MCNC: 0.36 MG/DL (ref 0.2–1)
BUN SERPL-MCNC: 18 MG/DL (ref 5–25)
CALCIUM SERPL-MCNC: 9.4 MG/DL (ref 8.3–10.1)
CHLORIDE SERPL-SCNC: 107 MMOL/L (ref 100–108)
CHOLEST SERPL-MCNC: 168 MG/DL (ref 50–200)
CO2 SERPL-SCNC: 26 MMOL/L (ref 21–32)
CREAT SERPL-MCNC: 0.8 MG/DL (ref 0.6–1.3)
EOSINOPHIL # BLD AUTO: 0.1 THOUSAND/ΜL (ref 0–0.61)
EOSINOPHIL NFR BLD AUTO: 1 % (ref 0–6)
ERYTHROCYTE [DISTWIDTH] IN BLOOD BY AUTOMATED COUNT: 15.1 % (ref 11.6–15.1)
GFR SERPL CREATININE-BSD FRML MDRD: 99 ML/MIN/1.73SQ M
GLUCOSE P FAST SERPL-MCNC: 78 MG/DL (ref 65–99)
HCT VFR BLD AUTO: 42.3 % (ref 34.8–46.1)
HDLC SERPL-MCNC: 51 MG/DL (ref 40–60)
HGB BLD-MCNC: 13.4 G/DL (ref 11.5–15.4)
IMM GRANULOCYTES # BLD AUTO: 0.06 THOUSAND/UL (ref 0–0.2)
IMM GRANULOCYTES NFR BLD AUTO: 1 % (ref 0–2)
LDLC SERPL CALC-MCNC: 81 MG/DL (ref 0–100)
LYMPHOCYTES # BLD AUTO: 2.34 THOUSANDS/ΜL (ref 0.6–4.47)
LYMPHOCYTES NFR BLD AUTO: 20 % (ref 14–44)
MCH RBC QN AUTO: 27.9 PG (ref 26.8–34.3)
MCHC RBC AUTO-ENTMCNC: 31.7 G/DL (ref 31.4–37.4)
MCV RBC AUTO: 88 FL (ref 82–98)
MONOCYTES # BLD AUTO: 0.7 THOUSAND/ΜL (ref 0.17–1.22)
MONOCYTES NFR BLD AUTO: 6 % (ref 4–12)
NEUTROPHILS # BLD AUTO: 8.46 THOUSANDS/ΜL (ref 1.85–7.62)
NEUTS SEG NFR BLD AUTO: 72 % (ref 43–75)
NRBC BLD AUTO-RTO: 0 /100 WBCS
PLATELET # BLD AUTO: 277 THOUSANDS/UL (ref 149–390)
PMV BLD AUTO: 11.8 FL (ref 8.9–12.7)
POTASSIUM SERPL-SCNC: 3.7 MMOL/L (ref 3.5–5.3)
PROT SERPL-MCNC: 8.1 G/DL (ref 6.4–8.2)
RBC # BLD AUTO: 4.8 MILLION/UL (ref 3.81–5.12)
SODIUM SERPL-SCNC: 138 MMOL/L (ref 136–145)
TRIGL SERPL-MCNC: 180 MG/DL
TSH SERPL DL<=0.05 MIU/L-ACNC: 2.63 UIU/ML (ref 0.36–3.74)
WBC # BLD AUTO: 11.71 THOUSAND/UL (ref 4.31–10.16)

## 2019-09-27 PROCEDURE — 84443 ASSAY THYROID STIM HORMONE: CPT | Performed by: PHYSICIAN ASSISTANT

## 2019-09-27 PROCEDURE — 80061 LIPID PANEL: CPT | Performed by: PHYSICIAN ASSISTANT

## 2019-09-27 PROCEDURE — 85025 COMPLETE CBC W/AUTO DIFF WBC: CPT | Performed by: PHYSICIAN ASSISTANT

## 2019-09-27 PROCEDURE — 80053 COMPREHEN METABOLIC PANEL: CPT | Performed by: PHYSICIAN ASSISTANT

## 2019-09-27 PROCEDURE — 82306 VITAMIN D 25 HYDROXY: CPT | Performed by: PHYSICIAN ASSISTANT

## 2019-09-27 PROCEDURE — 36415 COLL VENOUS BLD VENIPUNCTURE: CPT | Performed by: PHYSICIAN ASSISTANT

## 2020-03-24 DIAGNOSIS — I10 ESSENTIAL HYPERTENSION: ICD-10-CM

## 2020-03-24 DIAGNOSIS — E78.00 PURE HYPERCHOLESTEROLEMIA: ICD-10-CM

## 2020-03-25 RX ORDER — PROPRANOLOL HYDROCHLORIDE 20 MG/1
TABLET ORAL
Qty: 180 TABLET | Refills: 0 | Status: SHIPPED | OUTPATIENT
Start: 2020-03-25 | End: 2020-07-07 | Stop reason: SDUPTHER

## 2020-03-25 RX ORDER — ATORVASTATIN CALCIUM 20 MG/1
TABLET, FILM COATED ORAL
Qty: 90 TABLET | Refills: 0 | Status: SHIPPED | OUTPATIENT
Start: 2020-03-25 | End: 2020-07-07 | Stop reason: SDUPTHER

## 2020-07-07 DIAGNOSIS — I10 ESSENTIAL HYPERTENSION: ICD-10-CM

## 2020-07-07 DIAGNOSIS — E78.00 PURE HYPERCHOLESTEROLEMIA: ICD-10-CM

## 2020-07-07 RX ORDER — ATORVASTATIN CALCIUM 20 MG/1
20 TABLET, FILM COATED ORAL DAILY
Qty: 90 TABLET | Refills: 1 | Status: SHIPPED | OUTPATIENT
Start: 2020-07-07 | End: 2021-01-08 | Stop reason: SDUPTHER

## 2020-07-07 RX ORDER — PROPRANOLOL HYDROCHLORIDE 20 MG/1
20 TABLET ORAL EVERY 12 HOURS SCHEDULED
Qty: 180 TABLET | Refills: 1 | Status: SHIPPED | OUTPATIENT
Start: 2020-07-07 | End: 2020-09-29

## 2020-07-07 NOTE — TELEPHONE ENCOUNTER
Pt needs atorvastatin (LIPITOR) 20 mg tablet and propranolol (INDERAL) 20 mg tablet refilled, send to [facility] [place], [place]

## 2020-09-17 ENCOUNTER — OFFICE VISIT (OUTPATIENT)
Dept: INTERNAL MEDICINE CLINIC | Facility: CLINIC | Age: 30
End: 2020-09-17
Payer: COMMERCIAL

## 2020-09-17 VITALS
RESPIRATION RATE: 18 BRPM | TEMPERATURE: 97.6 F | HEIGHT: 64 IN | SYSTOLIC BLOOD PRESSURE: 120 MMHG | DIASTOLIC BLOOD PRESSURE: 78 MMHG | BODY MASS INDEX: 50.02 KG/M2 | HEART RATE: 94 BPM | WEIGHT: 293 LBS | OXYGEN SATURATION: 98 %

## 2020-09-17 DIAGNOSIS — E55.9 VITAMIN D DEFICIENCY: ICD-10-CM

## 2020-09-17 DIAGNOSIS — Z13.29 SCREENING FOR THYROID DISORDER: ICD-10-CM

## 2020-09-17 DIAGNOSIS — Z13.220 SCREENING, LIPID: ICD-10-CM

## 2020-09-17 DIAGNOSIS — Z13.0 SCREENING FOR DEFICIENCY ANEMIA: ICD-10-CM

## 2020-09-17 DIAGNOSIS — I10 ESSENTIAL HYPERTENSION: Primary | ICD-10-CM

## 2020-09-17 PROCEDURE — 3078F DIAST BP <80 MM HG: CPT | Performed by: PHYSICIAN ASSISTANT

## 2020-09-17 PROCEDURE — 1036F TOBACCO NON-USER: CPT | Performed by: PHYSICIAN ASSISTANT

## 2020-09-17 PROCEDURE — 99213 OFFICE O/P EST LOW 20 MIN: CPT | Performed by: PHYSICIAN ASSISTANT

## 2020-09-17 PROCEDURE — 3725F SCREEN DEPRESSION PERFORMED: CPT | Performed by: PHYSICIAN ASSISTANT
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
## 2020-09-28 ENCOUNTER — APPOINTMENT (OUTPATIENT)
Dept: LAB | Facility: CLINIC | Age: 30
End: 2020-09-28
Payer: COMMERCIAL

## 2020-09-28 ENCOUNTER — TRANSCRIBE ORDERS (OUTPATIENT)
Dept: LAB | Facility: CLINIC | Age: 30
End: 2020-09-28

## 2020-09-28 DIAGNOSIS — Z13.220 SCREENING, LIPID: ICD-10-CM

## 2020-09-28 DIAGNOSIS — Z13.29 SCREENING FOR THYROID DISORDER: ICD-10-CM

## 2020-09-28 DIAGNOSIS — Z13.0 SCREENING FOR DEFICIENCY ANEMIA: ICD-10-CM

## 2020-09-28 DIAGNOSIS — I10 ESSENTIAL HYPERTENSION: ICD-10-CM

## 2020-09-28 DIAGNOSIS — E55.9 VITAMIN D DEFICIENCY: ICD-10-CM

## 2020-09-28 LAB
25(OH)D3 SERPL-MCNC: 57.1 NG/ML (ref 30–100)
ALBUMIN SERPL BCP-MCNC: 3.6 G/DL (ref 3.5–5)
ALP SERPL-CCNC: 100 U/L (ref 46–116)
ALT SERPL W P-5'-P-CCNC: 32 U/L (ref 12–78)
ANION GAP SERPL CALCULATED.3IONS-SCNC: 7 MMOL/L (ref 4–13)
AST SERPL W P-5'-P-CCNC: 12 U/L (ref 5–45)
BASOPHILS # BLD AUTO: 0.06 THOUSANDS/ΜL (ref 0–0.1)
BASOPHILS NFR BLD AUTO: 1 % (ref 0–1)
BILIRUB SERPL-MCNC: 0.46 MG/DL (ref 0.2–1)
BUN SERPL-MCNC: 17 MG/DL (ref 5–25)
CALCIUM SERPL-MCNC: 9.2 MG/DL (ref 8.3–10.1)
CHLORIDE SERPL-SCNC: 109 MMOL/L (ref 100–108)
CHOLEST SERPL-MCNC: 174 MG/DL (ref 50–200)
CO2 SERPL-SCNC: 26 MMOL/L (ref 21–32)
CREAT SERPL-MCNC: 0.7 MG/DL (ref 0.6–1.3)
EOSINOPHIL # BLD AUTO: 0.1 THOUSAND/ΜL (ref 0–0.61)
EOSINOPHIL NFR BLD AUTO: 1 % (ref 0–6)
ERYTHROCYTE [DISTWIDTH] IN BLOOD BY AUTOMATED COUNT: 14.2 % (ref 11.6–15.1)
GFR SERPL CREATININE-BSD FRML MDRD: 116 ML/MIN/1.73SQ M
GLUCOSE P FAST SERPL-MCNC: 92 MG/DL (ref 65–99)
HCT VFR BLD AUTO: 42.8 % (ref 34.8–46.1)
HDLC SERPL-MCNC: 51 MG/DL
HGB BLD-MCNC: 14 G/DL (ref 11.5–15.4)
IMM GRANULOCYTES # BLD AUTO: 0.06 THOUSAND/UL (ref 0–0.2)
IMM GRANULOCYTES NFR BLD AUTO: 1 % (ref 0–2)
LDLC SERPL CALC-MCNC: 87 MG/DL (ref 0–100)
LYMPHOCYTES # BLD AUTO: 2 THOUSANDS/ΜL (ref 0.6–4.47)
LYMPHOCYTES NFR BLD AUTO: 19 % (ref 14–44)
MCH RBC QN AUTO: 29.5 PG (ref 26.8–34.3)
MCHC RBC AUTO-ENTMCNC: 32.7 G/DL (ref 31.4–37.4)
MCV RBC AUTO: 90 FL (ref 82–98)
MONOCYTES # BLD AUTO: 0.84 THOUSAND/ΜL (ref 0.17–1.22)
MONOCYTES NFR BLD AUTO: 8 % (ref 4–12)
NEUTROPHILS # BLD AUTO: 7.62 THOUSANDS/ΜL (ref 1.85–7.62)
NEUTS SEG NFR BLD AUTO: 70 % (ref 43–75)
NONHDLC SERPL-MCNC: 123 MG/DL
NRBC BLD AUTO-RTO: 0 /100 WBCS
PLATELET # BLD AUTO: 310 THOUSANDS/UL (ref 149–390)
PMV BLD AUTO: 11.4 FL (ref 8.9–12.7)
POTASSIUM SERPL-SCNC: 4 MMOL/L (ref 3.5–5.3)
PROT SERPL-MCNC: 7.4 G/DL (ref 6.4–8.2)
RBC # BLD AUTO: 4.75 MILLION/UL (ref 3.81–5.12)
SODIUM SERPL-SCNC: 142 MMOL/L (ref 136–145)
TRIGL SERPL-MCNC: 179 MG/DL
TSH SERPL DL<=0.05 MIU/L-ACNC: 1.74 UIU/ML (ref 0.36–3.74)
WBC # BLD AUTO: 10.68 THOUSAND/UL (ref 4.31–10.16)

## 2020-09-28 PROCEDURE — 82306 VITAMIN D 25 HYDROXY: CPT

## 2020-09-28 PROCEDURE — 84443 ASSAY THYROID STIM HORMONE: CPT

## 2020-09-28 PROCEDURE — 36415 COLL VENOUS BLD VENIPUNCTURE: CPT

## 2020-09-28 PROCEDURE — 80061 LIPID PANEL: CPT

## 2020-09-28 PROCEDURE — 80053 COMPREHEN METABOLIC PANEL: CPT

## 2020-09-28 PROCEDURE — 85025 COMPLETE CBC W/AUTO DIFF WBC: CPT

## 2020-09-29 DIAGNOSIS — I10 ESSENTIAL HYPERTENSION: ICD-10-CM

## 2020-09-29 RX ORDER — PROPRANOLOL HYDROCHLORIDE 20 MG/1
TABLET ORAL
Qty: 180 TABLET | Refills: 1 | Status: SHIPPED | OUTPATIENT
Start: 2020-09-29 | End: 2021-01-08 | Stop reason: SDUPTHER

## 2020-11-05 ENCOUNTER — PROCEDURE VISIT (OUTPATIENT)
Dept: INTERNAL MEDICINE CLINIC | Facility: CLINIC | Age: 30
End: 2020-11-05
Payer: COMMERCIAL

## 2020-11-05 VITALS
SYSTOLIC BLOOD PRESSURE: 108 MMHG | BODY MASS INDEX: 50.02 KG/M2 | RESPIRATION RATE: 18 BRPM | HEART RATE: 83 BPM | DIASTOLIC BLOOD PRESSURE: 76 MMHG | OXYGEN SATURATION: 98 % | TEMPERATURE: 97.9 F | HEIGHT: 64 IN | WEIGHT: 293 LBS

## 2020-11-05 DIAGNOSIS — Z01.419 ENCOUNTER FOR GYNECOLOGICAL EXAMINATION WITHOUT ABNORMAL FINDING: Primary | ICD-10-CM

## 2020-11-05 DIAGNOSIS — Z12.4 ENCOUNTER FOR SCREENING FOR CERVICAL CANCER: ICD-10-CM

## 2020-11-05 DIAGNOSIS — N93.9 ABNORMAL UTERINE BLEEDING: ICD-10-CM

## 2020-11-05 PROCEDURE — 3008F BODY MASS INDEX DOCD: CPT | Performed by: PHYSICIAN ASSISTANT

## 2020-11-05 PROCEDURE — G0145 SCR C/V CYTO,THINLAYER,RESCR: HCPCS | Performed by: PHYSICIAN ASSISTANT

## 2020-11-05 PROCEDURE — 99214 OFFICE O/P EST MOD 30 MIN: CPT | Performed by: PHYSICIAN ASSISTANT

## 2020-11-05 PROCEDURE — 3074F SYST BP LT 130 MM HG: CPT | Performed by: PHYSICIAN ASSISTANT

## 2020-11-05 PROCEDURE — S0612 ANNUAL GYNECOLOGICAL EXAMINA: HCPCS | Performed by: PHYSICIAN ASSISTANT

## 2020-11-05 PROCEDURE — 3078F DIAST BP <80 MM HG: CPT | Performed by: PHYSICIAN ASSISTANT

## 2020-11-05 PROCEDURE — 1036F TOBACCO NON-USER: CPT | Performed by: PHYSICIAN ASSISTANT

## 2020-11-12 DIAGNOSIS — B96.89 BV (BACTERIAL VAGINOSIS): Primary | ICD-10-CM

## 2020-11-12 DIAGNOSIS — N76.0 BV (BACTERIAL VAGINOSIS): Primary | ICD-10-CM

## 2020-11-12 LAB
LAB AP GYN PRIMARY INTERPRETATION: NORMAL
Lab: NORMAL
PATH INTERP SPEC-IMP: NORMAL

## 2020-11-12 RX ORDER — METRONIDAZOLE 500 MG/1
500 TABLET ORAL EVERY 8 HOURS SCHEDULED
Qty: 30 TABLET | Refills: 0 | Status: SHIPPED | OUTPATIENT
Start: 2020-11-12 | End: 2020-11-22

## 2021-01-08 DIAGNOSIS — I10 ESSENTIAL HYPERTENSION: ICD-10-CM

## 2021-01-08 DIAGNOSIS — E78.00 PURE HYPERCHOLESTEROLEMIA: ICD-10-CM

## 2021-01-08 RX ORDER — ATORVASTATIN CALCIUM 20 MG/1
20 TABLET, FILM COATED ORAL DAILY
Qty: 90 TABLET | Refills: 1 | Status: SHIPPED | OUTPATIENT
Start: 2021-01-08 | End: 2021-07-19 | Stop reason: SDUPTHER

## 2021-01-08 RX ORDER — PROPRANOLOL HYDROCHLORIDE 20 MG/1
20 TABLET ORAL EVERY 12 HOURS SCHEDULED
Qty: 180 TABLET | Refills: 1 | Status: SHIPPED | OUTPATIENT
Start: 2021-01-08 | End: 2021-07-19 | Stop reason: SDUPTHER

## 2021-03-11 ENCOUNTER — APPOINTMENT (OUTPATIENT)
Dept: RADIOLOGY | Facility: CLINIC | Age: 31
End: 2021-03-11
Payer: COMMERCIAL

## 2021-03-11 ENCOUNTER — TRANSCRIBE ORDERS (OUTPATIENT)
Dept: RADIOLOGY | Facility: CLINIC | Age: 31
End: 2021-03-11

## 2021-03-11 DIAGNOSIS — M19.071 ARTHRITIS OF BOTH FEET: ICD-10-CM

## 2021-03-11 DIAGNOSIS — M19.90 ARTHRITIS: Primary | ICD-10-CM

## 2021-03-11 DIAGNOSIS — M19.072 ARTHRITIS OF BOTH FEET: ICD-10-CM

## 2021-03-11 PROCEDURE — 73630 X-RAY EXAM OF FOOT: CPT

## 2021-05-15 ENCOUNTER — OFFICE VISIT (OUTPATIENT)
Dept: INTERNAL MEDICINE CLINIC | Facility: CLINIC | Age: 31
End: 2021-05-15
Payer: COMMERCIAL

## 2021-05-15 VITALS
OXYGEN SATURATION: 92 % | SYSTOLIC BLOOD PRESSURE: 126 MMHG | BODY MASS INDEX: 50.02 KG/M2 | TEMPERATURE: 98.5 F | DIASTOLIC BLOOD PRESSURE: 80 MMHG | HEART RATE: 101 BPM | WEIGHT: 293 LBS | HEIGHT: 64 IN

## 2021-05-15 DIAGNOSIS — T14.91XA SUICIDE ATTEMPT (HCC): ICD-10-CM

## 2021-05-15 DIAGNOSIS — Z12.11 COLON CANCER SCREENING: ICD-10-CM

## 2021-05-15 DIAGNOSIS — J45.990 EXERCISE-INDUCED ASTHMA: ICD-10-CM

## 2021-05-15 DIAGNOSIS — Z13.0 SCREENING FOR DEFICIENCY ANEMIA: ICD-10-CM

## 2021-05-15 DIAGNOSIS — Z13.29 SCREENING FOR THYROID DISORDER: ICD-10-CM

## 2021-05-15 DIAGNOSIS — E78.2 COMBINED HYPERLIPIDEMIA: ICD-10-CM

## 2021-05-15 DIAGNOSIS — E55.9 VITAMIN D DEFICIENCY: ICD-10-CM

## 2021-05-15 DIAGNOSIS — N93.9 ABNORMAL UTERINE BLEEDING: Primary | ICD-10-CM

## 2021-05-15 DIAGNOSIS — Z13.1 SCREENING FOR DIABETES MELLITUS: ICD-10-CM

## 2021-05-15 PROCEDURE — 3008F BODY MASS INDEX DOCD: CPT | Performed by: PHYSICIAN ASSISTANT

## 2021-05-15 PROCEDURE — 1036F TOBACCO NON-USER: CPT | Performed by: PHYSICIAN ASSISTANT

## 2021-05-15 PROCEDURE — 99214 OFFICE O/P EST MOD 30 MIN: CPT | Performed by: PHYSICIAN ASSISTANT

## 2021-05-17 ENCOUNTER — IMMUNIZATIONS (OUTPATIENT)
Dept: FAMILY MEDICINE CLINIC | Facility: HOSPITAL | Age: 31
End: 2021-05-17
Payer: COMMERCIAL

## 2021-05-17 ENCOUNTER — TRANSCRIBE ORDERS (OUTPATIENT)
Dept: LAB | Facility: CLINIC | Age: 31
End: 2021-05-17

## 2021-05-17 ENCOUNTER — APPOINTMENT (OUTPATIENT)
Dept: LAB | Facility: CLINIC | Age: 31
End: 2021-05-17
Payer: COMMERCIAL

## 2021-05-17 DIAGNOSIS — Z13.0 SCREENING FOR DEFICIENCY ANEMIA: ICD-10-CM

## 2021-05-17 DIAGNOSIS — E55.9 VITAMIN D DEFICIENCY: ICD-10-CM

## 2021-05-17 DIAGNOSIS — E78.2 COMBINED HYPERLIPIDEMIA: ICD-10-CM

## 2021-05-17 DIAGNOSIS — Z13.1 SCREENING FOR DIABETES MELLITUS: ICD-10-CM

## 2021-05-17 DIAGNOSIS — Z13.29 SCREENING FOR THYROID DISORDER: ICD-10-CM

## 2021-05-17 DIAGNOSIS — Z23 ENCOUNTER FOR IMMUNIZATION: Primary | ICD-10-CM

## 2021-05-17 DIAGNOSIS — N93.9 ABNORMAL UTERINE BLEEDING: ICD-10-CM

## 2021-05-17 PROBLEM — M79.9 SOFT TISSUE DISORDER: Status: RESOLVED | Noted: 2019-09-21 | Resolved: 2021-05-17

## 2021-05-17 LAB
25(OH)D3 SERPL-MCNC: 41.5 NG/ML (ref 30–100)
ALBUMIN SERPL BCP-MCNC: 3.5 G/DL (ref 3.5–5)
ALP SERPL-CCNC: 95 U/L (ref 46–116)
ALT SERPL W P-5'-P-CCNC: 31 U/L (ref 12–78)
ANION GAP SERPL CALCULATED.3IONS-SCNC: 3 MMOL/L (ref 4–13)
AST SERPL W P-5'-P-CCNC: 11 U/L (ref 5–45)
BASOPHILS # BLD AUTO: 0.05 THOUSANDS/ΜL (ref 0–0.1)
BASOPHILS NFR BLD AUTO: 1 % (ref 0–1)
BILIRUB SERPL-MCNC: 0.49 MG/DL (ref 0.2–1)
BUN SERPL-MCNC: 18 MG/DL (ref 5–25)
CALCIUM SERPL-MCNC: 9.3 MG/DL (ref 8.3–10.1)
CHLORIDE SERPL-SCNC: 105 MMOL/L (ref 100–108)
CHOLEST SERPL-MCNC: 169 MG/DL (ref 50–200)
CO2 SERPL-SCNC: 28 MMOL/L (ref 21–32)
CREAT SERPL-MCNC: 0.66 MG/DL (ref 0.6–1.3)
EOSINOPHIL # BLD AUTO: 0.14 THOUSAND/ΜL (ref 0–0.61)
EOSINOPHIL NFR BLD AUTO: 1 % (ref 0–6)
ERYTHROCYTE [DISTWIDTH] IN BLOOD BY AUTOMATED COUNT: 14.3 % (ref 11.6–15.1)
FERRITIN SERPL-MCNC: 83 NG/ML (ref 8–388)
FSH SERPL-ACNC: 6.1 MIU/ML
GFR SERPL CREATININE-BSD FRML MDRD: 117 ML/MIN/1.73SQ M
GLUCOSE P FAST SERPL-MCNC: 91 MG/DL (ref 65–99)
HCT VFR BLD AUTO: 44.9 % (ref 34.8–46.1)
HDLC SERPL-MCNC: 47 MG/DL
HGB BLD-MCNC: 14.4 G/DL (ref 11.5–15.4)
IMM GRANULOCYTES # BLD AUTO: 0.04 THOUSAND/UL (ref 0–0.2)
IMM GRANULOCYTES NFR BLD AUTO: 0 % (ref 0–2)
INSULIN SERPL-ACNC: 44.8 MU/L (ref 3–25)
IRON SATN MFR SERPL: 23 %
IRON SERPL-MCNC: 69 UG/DL (ref 50–170)
LDLC SERPL CALC-MCNC: 70 MG/DL (ref 0–100)
LH SERPL-ACNC: 6.4 MIU/ML
LYMPHOCYTES # BLD AUTO: 1.91 THOUSANDS/ΜL (ref 0.6–4.47)
LYMPHOCYTES NFR BLD AUTO: 19 % (ref 14–44)
MCH RBC QN AUTO: 28.8 PG (ref 26.8–34.3)
MCHC RBC AUTO-ENTMCNC: 32.1 G/DL (ref 31.4–37.4)
MCV RBC AUTO: 90 FL (ref 82–98)
MONOCYTES # BLD AUTO: 0.61 THOUSAND/ΜL (ref 0.17–1.22)
MONOCYTES NFR BLD AUTO: 6 % (ref 4–12)
NEUTROPHILS # BLD AUTO: 7.3 THOUSANDS/ΜL (ref 1.85–7.62)
NEUTS SEG NFR BLD AUTO: 73 % (ref 43–75)
NONHDLC SERPL-MCNC: 122 MG/DL
NRBC BLD AUTO-RTO: 0 /100 WBCS
PLATELET # BLD AUTO: 286 THOUSANDS/UL (ref 149–390)
PMV BLD AUTO: 11.2 FL (ref 8.9–12.7)
POTASSIUM SERPL-SCNC: 3.9 MMOL/L (ref 3.5–5.3)
PROT SERPL-MCNC: 7.6 G/DL (ref 6.4–8.2)
RBC # BLD AUTO: 5 MILLION/UL (ref 3.81–5.12)
SODIUM SERPL-SCNC: 136 MMOL/L (ref 136–145)
TESTOST SERPL-MCNC: 29 NG/DL
TIBC SERPL-MCNC: 301 UG/DL (ref 250–450)
TRIGL SERPL-MCNC: 262 MG/DL
TSH SERPL DL<=0.05 MIU/L-ACNC: 2.08 UIU/ML (ref 0.36–3.74)
WBC # BLD AUTO: 10.05 THOUSAND/UL (ref 4.31–10.16)

## 2021-05-17 PROCEDURE — 82627 DEHYDROEPIANDROSTERONE: CPT

## 2021-05-17 PROCEDURE — 82728 ASSAY OF FERRITIN: CPT

## 2021-05-17 PROCEDURE — 84270 ASSAY OF SEX HORMONE GLOBUL: CPT

## 2021-05-17 PROCEDURE — 85025 COMPLETE CBC W/AUTO DIFF WBC: CPT

## 2021-05-17 PROCEDURE — 83525 ASSAY OF INSULIN: CPT

## 2021-05-17 PROCEDURE — 0001A SARS-COV-2 / COVID-19 MRNA VACCINE (PFIZER-BIONTECH) 30 MCG: CPT

## 2021-05-17 PROCEDURE — 83540 ASSAY OF IRON: CPT

## 2021-05-17 PROCEDURE — 91300 SARS-COV-2 / COVID-19 MRNA VACCINE (PFIZER-BIONTECH) 30 MCG: CPT

## 2021-05-17 PROCEDURE — 80053 COMPREHEN METABOLIC PANEL: CPT

## 2021-05-17 PROCEDURE — 80061 LIPID PANEL: CPT

## 2021-05-17 PROCEDURE — 83002 ASSAY OF GONADOTROPIN (LH): CPT

## 2021-05-17 PROCEDURE — 36415 COLL VENOUS BLD VENIPUNCTURE: CPT

## 2021-05-17 PROCEDURE — 84443 ASSAY THYROID STIM HORMONE: CPT

## 2021-05-17 PROCEDURE — 83550 IRON BINDING TEST: CPT

## 2021-05-17 PROCEDURE — 84403 ASSAY OF TOTAL TESTOSTERONE: CPT

## 2021-05-17 PROCEDURE — 83001 ASSAY OF GONADOTROPIN (FSH): CPT

## 2021-05-17 PROCEDURE — 82306 VITAMIN D 25 HYDROXY: CPT

## 2021-05-17 NOTE — PROGRESS NOTES
Assessment/Plan:  Problem List Items Addressed This Visit        Respiratory    Exercise-induced asthma       Genitourinary    Abnormal uterine bleeding - Primary    Relevant Orders    US pelvis complete non OB    DHEA-sulfate    Follicle stimulating hormone    Insulin, fasting    Testosterone    Sex Hormone Binding Globulin    Luteinizing hormone    Iron Panel (Includes Ferritin, Iron Sat%, Iron, and TIBC)       Other    Combined hyperlipidemia    Relevant Orders    Lipid panel    [facility] ([facility] )      Other Visit Diagnoses     Vitamin D deficiency        Relevant Orders    Vitamin D 25 hydroxy    Colon cancer screening        Screening for deficiency anemia        Relevant Orders    CBC and differential    Screening for diabetes mellitus        Relevant Orders    Comprehensive metabolic panel    Screening for thyroid disorder        Relevant Orders    TSH, 3rd generation with Free T4 reflex           Diagnoses and all orders for this visit:    Abnormal uterine bleeding  -     US pelvis complete non OB; Future  -     DHEA-sulfate; Future  -     Follicle stimulating hormone; Future  -     Insulin, fasting; Future  -     Testosterone; Future  -     Sex Hormone Binding Globulin; Future  -     Luteinizing hormone; Future  -     Iron Panel (Includes Ferritin, Iron Sat%, Iron, and TIBC); Future    Suicide attempt ([facility] )    Exercise-induced asthma    Vitamin D deficiency  -     Vitamin D 25 hydroxy; Future    Combined hyperlipidemia  -     Lipid panel; Future    Colon cancer screening    Screening for deficiency anemia  -     CBC and differential; Future    Screening for diabetes mellitus  -     Comprehensive metabolic panel; Future    Screening for thyroid disorder  -     TSH, 3rd generation with Free T4 reflex; Future        No problem-specific Assessment & Plan notes found for this encounter  Subjective:      Patient ID: Cirilo Gaming is a 27 y o  female  Pt presents for routine visit   She is noting issues with abnormal vaginal bleeding  She notes she has been bleeding at some capacity since November  She notes she doesn't bleed all day but she will begin to at random moments and it will be quite a bit with large clots as well  Pt has had moments where she has bled through her pants and then uses a tampon and gets discomfort due to the bleeding stopping and the tampon remaining dry  The following portions of the patient's history were reviewed and updated as appropriate:   She has a past medical history of Anemia, Depression, Fractures (2018), Hyperlipidemia, Irregular menses, Migraines, Obesity, and Vitamin D deficiency  ,  does not have any pertinent problems on file  ,   has a past surgical history that includes ORIF tibia & fibula fractures (Right); [place] tooth extraction; and Knee surgery (Right, 05/2018)  ,  family history includes Hyperlipidemia in her mother; No Known Problems in her father  ,   reports that she has never smoked  She has never used smokeless tobacco  She reports current alcohol use  She reports current drug use  Drug: Marijuana  ,  is allergic to penicillins and sulfamethoxazole-trimethoprim     Current Outpatient Medications   Medication Sig Dispense Refill    atorvastatin (LIPITOR) 20 mg tablet Take 1 tablet (20 mg total) by mouth daily 90 tablet 1    propranolol (INDERAL) 20 mg tablet Take 1 tablet (20 mg total) by mouth every 12 (twelve) hours 180 tablet 1    ergocalciferol (VITAMIN D2) 50,000 units Take 1 capsule (50,000 Units total) by mouth once a week (Patient not taking: Reported on 5/15/2021) 12 capsule 1    ferrous sulfate 325 (65 Fe) mg tablet Take 325 mg by mouth daily with breakfast      Multiple Vitamins-Minerals (HAIR SKIN AND NAILS FORMULA PO) Take by mouth       No current facility-administered medications for this visit  Review of Systems   Constitutional: Negative for chills and fever     HENT: Negative for congestion, ear pain, hearing loss, postnasal drip, rhinorrhea, sinus pressure, sinus pain, sore throat and trouble swallowing  Eyes: Negative for pain and visual disturbance  Respiratory: Negative for cough, chest tightness, shortness of breath and wheezing  Cardiovascular: Negative  Negative for chest pain, palpitations and leg swelling  Gastrointestinal: Negative for abdominal pain, blood in stool, constipation, diarrhea, nausea and vomiting  Endocrine: Negative for cold intolerance, heat intolerance, polydipsia, polyphagia and polyuria  Genitourinary: Positive for vaginal bleeding  Negative for difficulty urinating, dysuria, flank pain and urgency  Musculoskeletal: Negative for arthralgias, back pain, gait problem and myalgias  Skin: Negative for rash  Allergic/Immunologic: Negative  Neurological: Negative for dizziness, weakness, light-headedness and headaches  Hematological: Negative  Psychiatric/Behavioral: Negative for behavioral problems, dysphoric mood and sleep disturbance  The patient is not nervous/anxious  PHQ-9 Depression Screening    PHQ-9:   Frequency of the following problems over the past two weeks:              Objective:  Vitals:    05/15/21 1227   BP: 126/80   Pulse: 101   Temp: 98 5 °F (36 9 °C)   SpO2: 92%   Weight: (!) 152 kg (334 lb 4 oz)   Height: 5' 4" (1 626 m)     Body mass index is 57 37 kg/m²  Physical Exam  Nursing note reviewed  Constitutional:       General: She is not in acute distress  Appearance: She is well-developed  She is not diaphoretic  HENT:      Head: Normocephalic and atraumatic  Right Ear: External ear normal       Left Ear: External ear normal       Nose: Nose normal       Mouth/Throat:      Pharynx: No oropharyngeal exudate  Eyes:      General: No scleral icterus  Right eye: No discharge  Left eye: No discharge  Conjunctiva/sclera: Conjunctivae normal       Pupils: Pupils are equal, round, and reactive to light     Neck: Musculoskeletal: Normal range of motion and neck supple  Thyroid: No thyromegaly  Cardiovascular:      Rate and Rhythm: Normal rate and regular rhythm  Heart sounds: Normal heart sounds  No murmur  No friction rub  No gallop  Pulmonary:      Effort: Pulmonary effort is normal  No respiratory distress  Breath sounds: Normal breath sounds  No wheezing or rales  Abdominal:      General: Bowel sounds are normal  There is no distension  Palpations: Abdomen is soft  Tenderness: There is no abdominal tenderness  Musculoskeletal: Normal range of motion  General: No tenderness or deformity  Skin:     General: Skin is warm and dry  Neurological:      Mental Status: She is alert and oriented to person, place, and time  Cranial Nerves: No cranial nerve deficit  Psychiatric:         Behavior: Behavior normal          Thought Content: Thought content normal          Judgment: Judgment normal        BMI Counseling: Body mass index is 57 37 kg/m²  The BMI is above normal  Nutrition recommendations include decreasing portion sizes, consuming healthier snacks and limiting drinks that contain sugar

## 2021-05-18 LAB
DHEA-S SERPL-MCNC: 48.5 UG/DL (ref 84.8–378)
SHBG SERPL-SCNC: 18.5 NMOL/L (ref 24.6–122)

## 2021-05-21 ENCOUNTER — HOSPITAL ENCOUNTER (OUTPATIENT)
Dept: ULTRASOUND IMAGING | Facility: HOSPITAL | Age: 31
Discharge: HOME | End: 2021-05-21
Payer: COMMERCIAL

## 2021-05-21 DIAGNOSIS — N93.9 ABNORMAL UTERINE BLEEDING: ICD-10-CM

## 2021-05-21 PROCEDURE — 76856 US EXAM PELVIC COMPLETE: CPT

## 2021-05-21 PROCEDURE — 76830 TRANSVAGINAL US NON-OB: CPT

## 2021-05-27 DIAGNOSIS — N93.9 ABNORMAL UTERINE BLEEDING: Primary | ICD-10-CM

## 2021-06-03 DIAGNOSIS — L30.9 DERMATITIS: Primary | ICD-10-CM

## 2021-06-03 RX ORDER — METHYLPREDNISOLONE 4 MG/1
TABLET ORAL
Qty: 21 EACH | Refills: 0 | Status: SHIPPED | OUTPATIENT
Start: 2021-06-03 | End: 2021-08-13

## 2021-06-07 ENCOUNTER — TELEPHONE (OUTPATIENT)
Dept: OBGYN CLINIC | Facility: MEDICAL CENTER | Age: 31
End: 2021-06-07

## 2021-06-15 ENCOUNTER — IMMUNIZATIONS (OUTPATIENT)
Dept: FAMILY MEDICINE CLINIC | Facility: HOSPITAL | Age: 31
End: 2021-06-15

## 2021-06-15 DIAGNOSIS — Z23 ENCOUNTER FOR IMMUNIZATION: Primary | ICD-10-CM

## 2021-06-15 PROCEDURE — 0002A SARS-COV-2 / COVID-19 MRNA VACCINE (PFIZER-BIONTECH) 30 MCG: CPT

## 2021-06-15 PROCEDURE — 91300 SARS-COV-2 / COVID-19 MRNA VACCINE (PFIZER-BIONTECH) 30 MCG: CPT

## 2021-06-23 ENCOUNTER — OFFICE VISIT (OUTPATIENT)
Dept: OBGYN CLINIC | Facility: CLINIC | Age: 31
End: 2021-06-23
Payer: COMMERCIAL

## 2021-06-23 VITALS
WEIGHT: 293 LBS | HEIGHT: 64 IN | DIASTOLIC BLOOD PRESSURE: 78 MMHG | BODY MASS INDEX: 50.02 KG/M2 | SYSTOLIC BLOOD PRESSURE: 130 MMHG

## 2021-06-23 DIAGNOSIS — N92.6 IRREGULAR MENSES: ICD-10-CM

## 2021-06-23 DIAGNOSIS — N92.1 MENORRHAGIA WITH IRREGULAR CYCLE: ICD-10-CM

## 2021-06-23 DIAGNOSIS — N93.9 ABNORMAL UTERINE BLEEDING: Primary | ICD-10-CM

## 2021-06-23 DIAGNOSIS — R93.89 ENDOMETRIAL THICKENING ON ULTRASOUND: ICD-10-CM

## 2021-06-23 PROCEDURE — 99202 OFFICE O/P NEW SF 15 MIN: CPT | Performed by: ADVANCED PRACTICE MIDWIFE

## 2021-06-23 PROCEDURE — 3008F BODY MASS INDEX DOCD: CPT | Performed by: PHYSICIAN ASSISTANT

## 2021-06-23 NOTE — PROGRESS NOTES
[facility] [address] [facility], [place]    Assessment/Plan:  No problem-specific Assessment & Plan notes found for this encounter  Diagnoses and all orders for this visit:    Abnormal uterine bleeding    Irregular menses  -     norethindrone (AYGESTIN) 5 mg tablet; Take 1 tablet (5 mg total) by mouth daily  -     US pelvis complete non OB; Future    Endometrial thickening on ultrasound  -     US pelvis complete non OB; Future    Menorrhagia with irregular cycle    - will start Aygestin 5 mg daily  If bleeding is not controlled after 1 week will increase to 2 per day  To call office if bleeding does not stop  - repeat u/s in 3 months  - to call if worsens          Subjective:   Anselmo Mark is a 27 y o  [place] female  CC: heavy vaginal bleeding    HPI: Andreia presents today with complaints of irregular menses  States that she has been bleeding daily for the past 5 months  Some days she has large clots and then starts to slow and starts with clots all over again  Menses started at age 14 and was monthly  When she was in high school started to have heavier periods and also started to gain weight  When she was on birth control was not getting menses, changed to [address] and also did not have a period  She is not currently sexually active  Based on hx and symptoms I suspect PCOs with insulin resistance  Lost 4 lbs in past 2-3 weeks on low carb diet  ROS: Review of Systems   Constitutional: Negative for activity change, appetite change, fatigue and fever  Has made recent changes to diet and activity   Respiratory: Negative for cough and shortness of breath  Cardiovascular: Negative for chest pain, palpitations and leg swelling  Gastrointestinal: Negative for constipation and diarrhea  Genitourinary: Positive for menstrual problem, pelvic pain and vaginal bleeding  Negative for difficulty urinating and frequency  Skin: Negative for pallor     Neurological: Negative for light-headedness and headaches  Psychiatric/Behavioral: The patient is not nervous/anxious  PFSH: The following portions of the patient's history were reviewed and updated as appropriate: allergies, current medications, past family history, past medical history, obstetric history, gynecologic history, past social history, past surgical history and problem list        Objective:  /78 (BP Location: Right arm, Patient Position: Sitting, Cuff Size: Large)   Ht 5' 4" (1 626 m)   Wt (!) 153 kg (336 lb 6 4 oz)   LMP  (LMP Unknown)   BMI 57 74 kg/m²    Physical Exam  Vitals reviewed  Constitutional:       Appearance: She is obese  Cardiovascular:      Rate and Rhythm: Normal rate and regular rhythm  Heart sounds: Normal heart sounds  Pulmonary:      Effort: Pulmonary effort is normal       Breath sounds: Normal breath sounds  Genitourinary:     General: Normal vulva  Labia:         Right: No rash, tenderness or lesion  Left: No rash, tenderness or lesion  Vagina: Bleeding present  No vaginal discharge, erythema or tenderness  Cervix: No cervical motion tenderness, discharge, lesion or erythema  Comments: Large amount of blood and clot removed from vagina  Bleeding continues and clot passed from os multiple times, unable to visualize due to constant pooling  Attempt to do endometrial biopsy failed- unable to visualize os and get curette passed  Musculoskeletal:         General: Normal range of motion  Cervical back: Normal range of motion  Neurological:      Mental Status: She is alert and oriented to person, place, and time     Psychiatric:         Mood and Affect: Mood normal          Behavior: Behavior normal          Judgment: Judgment normal

## 2021-06-28 PROBLEM — N92.1 MENORRHAGIA WITH IRREGULAR CYCLE: Status: ACTIVE | Noted: 2021-06-28

## 2021-07-06 DIAGNOSIS — E28.2 PCOS (POLYCYSTIC OVARIAN SYNDROME): Primary | ICD-10-CM

## 2021-07-20 ENCOUNTER — OFFICE VISIT (OUTPATIENT)
Dept: OBGYN CLINIC | Facility: MEDICAL CENTER | Age: 31
End: 2021-07-20
Payer: COMMERCIAL

## 2021-07-20 VITALS
DIASTOLIC BLOOD PRESSURE: 90 MMHG | BODY MASS INDEX: 50.02 KG/M2 | SYSTOLIC BLOOD PRESSURE: 120 MMHG | HEIGHT: 64 IN | WEIGHT: 293 LBS

## 2021-07-20 DIAGNOSIS — N93.9 ABNORMAL UTERINE BLEEDING: Primary | ICD-10-CM

## 2021-07-20 DIAGNOSIS — Z30.430 ENCOUNTER FOR IUD INSERTION: ICD-10-CM

## 2021-07-20 DIAGNOSIS — E66.01 CLASS 3 SEVERE OBESITY DUE TO EXCESS CALORIES WITHOUT SERIOUS COMORBIDITY WITH BODY MASS INDEX (BMI) OF 50.0 TO 59.9 IN ADULT (HCC): ICD-10-CM

## 2021-07-20 DIAGNOSIS — R10.9 ABDOMINAL CRAMPS: ICD-10-CM

## 2021-07-20 PROCEDURE — 99214 OFFICE O/P EST MOD 30 MIN: CPT | Performed by: STUDENT IN AN ORGANIZED HEALTH CARE EDUCATION/TRAINING PROGRAM

## 2021-07-20 PROCEDURE — 58300 INSERT INTRAUTERINE DEVICE: CPT | Performed by: STUDENT IN AN ORGANIZED HEALTH CARE EDUCATION/TRAINING PROGRAM

## 2021-07-20 PROCEDURE — 3008F BODY MASS INDEX DOCD: CPT | Performed by: STUDENT IN AN ORGANIZED HEALTH CARE EDUCATION/TRAINING PROGRAM

## 2021-07-20 PROCEDURE — 88305 TISSUE EXAM BY PATHOLOGIST: CPT | Performed by: PATHOLOGY

## 2021-07-20 PROCEDURE — 1036F TOBACCO NON-USER: CPT | Performed by: STUDENT IN AN ORGANIZED HEALTH CARE EDUCATION/TRAINING PROGRAM

## 2021-07-20 RX ORDER — KETOROLAC TROMETHAMINE 30 MG/ML
30 INJECTION, SOLUTION INTRAMUSCULAR; INTRAVENOUS ONCE
Status: DISCONTINUED | OUTPATIENT
Start: 2021-07-20 | End: 2021-07-20

## 2021-07-20 RX ORDER — KETOROLAC TROMETHAMINE 30 MG/ML
30 INJECTION, SOLUTION INTRAMUSCULAR; INTRAVENOUS ONCE
Status: COMPLETED | OUTPATIENT
Start: 2021-07-20 | End: 2021-07-20

## 2021-07-20 RX ADMIN — KETOROLAC TROMETHAMINE 30 MG: 30 INJECTION, SOLUTION INTRAMUSCULAR; INTRAVENOUS at 14:12

## 2021-07-20 NOTE — PROGRESS NOTES
[facility] [address] [place] [address]    Iud insertions    Date/Time: 7/20/2021 2:12 PM  Performed by: Lidia Viera MD  Authorized by: Lidia Viera MD   Universal Protocol:  Consent: Written consent obtained  Risks and benefits: risks, benefits and alternatives were discussed  Consent given by: patient  Time out: Immediately prior to procedure a "time out" was called to verify the correct patient, procedure, equipment, support staff and site/side marked as required    Timeout called at: 7/20/2021 12:15 PM   Patient understanding: patient states understanding of the procedure being performed  Patient consent: the patient's understanding of the procedure matches consent given  Procedure consent: procedure consent matches procedure scheduled  Relevant documents: relevant documents present and verified  Test results: test results available and properly labeled  Site marked: the operative site was marked  Required items: required blood products, implants, devices, and special equipment available  Patient identity confirmed: verbally with patient        Procedure:     Pelvic exam performed: yes      Negative GC/chlamydia test: yes      Negative urine pregnancy test: yes      Cervix cleaned and prepped: yes      Speculum placed in vagina: yes      Tenaculum applied to cervix: yes      Uterus sounded: yes      Uterus sound depth (cm):  8    IUD inserted with no complications: yes      IUD type:  Mirena    Strings trimmed: yes    Post-procedure:     Patient tolerated procedure well: yes      Patient will follow up after next period: yes          Thanh Wakefield MD  [address] [place] [address]  7/20/2021 2:13 PM

## 2021-07-20 NOTE — ASSESSMENT & PLAN NOTE
- We reviewed her recent pelvic ultrasound which revealed 13 mm endometrial lining heterogeneous and otherwise unremarkable pelvic pathology  - We reviewed in detail the differential diagnosis of AUB  - We reviewed her labs which revealed normal testosterone levels, normal TSH  - We reviewed management options  She has failed oral progestin  I recommended EMB and Mirena IUD insertion  She opted to do this today, and signed a financial liability form in order to expedite her care in the setting of prolonged heavy bleeding   - Recommended she continue oral progestin until Mirena becomes therapeutic  Recommend follow up in 3 months to monitor response to therapy  - Patient tolerated EMB and Mirena IUD insertion well will no complications  See separate procedure note

## 2021-07-20 NOTE — PROGRESS NOTES
[facility] [address] [place] [address]    Assessment/Plan:  Adrienne Mejia is a 27 y o  [place] who presents with heavy menstrual bleeding  Abnormal uterine bleeding  - We reviewed her recent pelvic ultrasound which revealed 13 mm endometrial lining heterogeneous and otherwise unremarkable pelvic pathology  - We reviewed in detail the differential diagnosis of AUB  - We reviewed her labs which revealed normal testosterone levels, normal TSH  - We reviewed management options  She has failed oral progestin  I recommended EMB and Mirena IUD insertion  She opted to do this today, and signed a financial liability form in order to expedite her care in the setting of prolonged heavy bleeding   - Recommended she continue oral progestin until Mirena becomes therapeutic  Recommend follow up in 3 months to monitor response to therapy  - Patient tolerated EMB and Mirena IUD insertion well will no complications  See separate procedure note  Diagnoses and all orders for this visit:    Abnormal uterine bleeding  -     levonorgestrel (MIRENA) IUD 20 mcg/day  -     Tissue Exam; Future    Abdominal cramps  -     ketorolac (TORADOL) 60 mg/2 mL IM injection 30 mg    Class 3 severe obesity due to excess calories without serious comorbidity with body mass index (BMI) of 50 0 to 59 9 in adult [facility])          Subjective:   Adrienne Mejia is a 27 y o  [place] female  CC: Heavy menstrual bleeding    HPI: Andreia was seen in our office by our midlevel provider for menstrual bleeding two months ago and started on oral progestin with minimal improvement  She returns to the office today to discuss further management  ROS: Review of Systems   Constitutional: Negative for chills and fever  Respiratory: Negative for cough and shortness of breath  Cardiovascular: Negative for chest pain and leg swelling  Gastrointestinal: Negative for abdominal pain, nausea and vomiting     Genitourinary: Negative for dysuria, frequency and urgency  Neurological: Negative for dizziness, light-headedness and headaches  PFSH: The following portions of the patient's history were reviewed and updated as appropriate: allergies, current medications, past family history, past medical history, obstetric history, gynecologic history, past social history, past surgical history and problem list        Objective:  /90   Ht 5' 4" (1 626 m)   Wt (!) 148 kg (326 lb)   LMP  (LMP Unknown)   BMI 55 96 kg/m²    Physical Exam  Constitutional:       Appearance: Normal appearance  HENT:      Head: Normocephalic and atraumatic  Cardiovascular:      Rate and Rhythm: Normal rate  Pulmonary:      Effort: Pulmonary effort is normal    Abdominal:      General: There is no distension  Tenderness: There is no abdominal tenderness  There is no guarding  Genitourinary:     Exam position: Lithotomy position  Pubic Area: No rash  Labia:         Right: No rash, tenderness or lesion  Left: No rash, tenderness or lesion  Urethra: No prolapse, urethral swelling or urethral lesion  Vagina: No vaginal discharge, erythema, tenderness, bleeding or lesions  Cervix: No cervical motion tenderness, discharge, friability or erythema  Comments: Vaginal bleeding  Lymphadenopathy:      Lower Body: No right inguinal adenopathy  No left inguinal adenopathy  Neurological:      Mental Status: She is alert             Xuan Jolly MD  [address] [place] [address]  7/20/2021 2:11 PM

## 2021-08-13 ENCOUNTER — OFFICE VISIT (OUTPATIENT)
Dept: INTERNAL MEDICINE CLINIC | Facility: CLINIC | Age: 31
End: 2021-08-13
Payer: COMMERCIAL

## 2021-08-13 VITALS
BODY MASS INDEX: 50.02 KG/M2 | HEIGHT: 64 IN | HEART RATE: 83 BPM | SYSTOLIC BLOOD PRESSURE: 116 MMHG | TEMPERATURE: 97.8 F | DIASTOLIC BLOOD PRESSURE: 90 MMHG | OXYGEN SATURATION: 96 % | WEIGHT: 293 LBS | RESPIRATION RATE: 16 BRPM

## 2021-08-13 DIAGNOSIS — N93.9 ABNORMAL UTERINE BLEEDING: ICD-10-CM

## 2021-08-13 DIAGNOSIS — N92.1 MENORRHAGIA WITH IRREGULAR CYCLE: ICD-10-CM

## 2021-08-13 DIAGNOSIS — G43.809 OTHER MIGRAINE WITHOUT STATUS MIGRAINOSUS, NOT INTRACTABLE: Primary | ICD-10-CM

## 2021-08-13 PROBLEM — G43.909 MIGRAINE: Status: ACTIVE | Noted: 2021-08-13

## 2021-08-13 PROCEDURE — 3008F BODY MASS INDEX DOCD: CPT | Performed by: PHYSICIAN ASSISTANT

## 2021-08-13 PROCEDURE — 96372 THER/PROPH/DIAG INJ SC/IM: CPT | Performed by: PHYSICIAN ASSISTANT

## 2021-08-13 PROCEDURE — 1036F TOBACCO NON-USER: CPT | Performed by: PHYSICIAN ASSISTANT

## 2021-08-13 PROCEDURE — 99214 OFFICE O/P EST MOD 30 MIN: CPT | Performed by: PHYSICIAN ASSISTANT

## 2021-08-13 RX ADMIN — KETOROLAC TROMETHAMINE 60 MG: 30 INJECTION, SOLUTION INTRAMUSCULAR; INTRAVENOUS at 07:56
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
## 2021-08-13 NOTE — ASSESSMENT & PLAN NOTE
Continue with GYN  Hopefully symptoms improve with mirena use  They have a back up plan for hysterectomy if that does not work

## 2021-08-16 RX ORDER — KETOROLAC TROMETHAMINE 30 MG/ML
60 INJECTION, SOLUTION INTRAMUSCULAR; INTRAVENOUS ONCE
Status: COMPLETED | OUTPATIENT
Start: 2021-08-13 | End: 2021-08-13

## 2021-09-20 ENCOUNTER — HOSPITAL ENCOUNTER (OUTPATIENT)
Dept: ULTRASOUND IMAGING | Facility: HOSPITAL | Age: 31
Discharge: HOME | End: 2021-09-20
Payer: COMMERCIAL

## 2021-09-20 DIAGNOSIS — R93.89 ENDOMETRIAL THICKENING ON ULTRASOUND: ICD-10-CM

## 2021-09-20 DIAGNOSIS — N92.6 IRREGULAR MENSES: ICD-10-CM

## 2021-09-20 PROCEDURE — 76830 TRANSVAGINAL US NON-OB: CPT

## 2021-09-20 PROCEDURE — 76856 US EXAM PELVIC COMPLETE: CPT

## 2021-09-27 ENCOUNTER — TELEPHONE (OUTPATIENT)
Dept: OBGYN CLINIC | Facility: CLINIC | Age: 31
End: 2021-09-27

## 2021-09-27 DIAGNOSIS — N94.6 DYSMENORRHEA: Primary | ICD-10-CM

## 2021-09-27 RX ORDER — NAPROXEN SODIUM 550 MG/1
550 TABLET ORAL 2 TIMES DAILY PRN
Qty: 10 TABLET | Refills: 0 | Status: SHIPPED | OUTPATIENT
Start: 2021-09-27 | End: 2022-01-11

## 2021-09-27 NOTE — TELEPHONE ENCOUNTER
States bleeding stopped, but cramping noted and not relieved with Ibuprophen   Will send script for Anaprox

## 2021-10-01 PROCEDURE — U0005 INFEC AGEN DETEC AMPLI PROBE: HCPCS | Performed by: PHYSICIAN ASSISTANT

## 2021-10-01 PROCEDURE — U0003 INFECTIOUS AGENT DETECTION BY NUCLEIC ACID (DNA OR RNA); SEVERE ACUTE RESPIRATORY SYNDROME CORONAVIRUS 2 (SARS-COV-2) (CORONAVIRUS DISEASE [COVID-19]), AMPLIFIED PROBE TECHNIQUE, MAKING USE OF HIGH THROUGHPUT TECHNOLOGIES AS DESCRIBED BY CMS-2020-01-R: HCPCS | Performed by: PHYSICIAN ASSISTANT

## 2021-10-09 ENCOUNTER — APPOINTMENT (EMERGENCY)
Dept: RADIOLOGY | Facility: HOSPITAL | Age: 31
End: 2021-10-09
Payer: COMMERCIAL

## 2021-10-09 ENCOUNTER — HOSPITAL ENCOUNTER (EMERGENCY)
Facility: HOSPITAL | Age: 31
Discharge: HOME | End: 2021-10-09
Attending: EMERGENCY MEDICINE
Payer: COMMERCIAL

## 2021-10-09 VITALS
HEIGHT: 64 IN | BODY MASS INDEX: 50.02 KG/M2 | TEMPERATURE: 97.6 F | DIASTOLIC BLOOD PRESSURE: 86 MMHG | OXYGEN SATURATION: 100 % | RESPIRATION RATE: 19 BRPM | HEART RATE: 76 BPM | SYSTOLIC BLOOD PRESSURE: 145 MMHG | WEIGHT: 293 LBS

## 2021-10-09 DIAGNOSIS — R07.9 CHEST PAIN: Primary | ICD-10-CM

## 2021-10-09 LAB
ANION GAP SERPL CALCULATED.3IONS-SCNC: 9 MMOL/L (ref 4–13)
BASOPHILS # BLD AUTO: 0.1 THOUSANDS/ΜL (ref 0–0.1)
BASOPHILS NFR BLD AUTO: 1 % (ref 0–2)
BUN SERPL-MCNC: 18 MG/DL (ref 7–25)
CALCIUM SERPL-MCNC: 9 MG/DL (ref 8.6–10.5)
CHLORIDE SERPL-SCNC: 102 MMOL/L (ref 98–107)
CO2 SERPL-SCNC: 27 MMOL/L (ref 21–31)
CREAT SERPL-MCNC: 0.78 MG/DL (ref 0.6–1.2)
EOSINOPHIL # BLD AUTO: 0.1 THOUSAND/ΜL (ref 0–0.61)
EOSINOPHIL NFR BLD AUTO: 1 % (ref 0–5)
ERYTHROCYTE [DISTWIDTH] IN BLOOD BY AUTOMATED COUNT: 15.6 % (ref 11.5–14.5)
GFR SERPL CREATININE-BSD FRML MDRD: 101 ML/MIN/1.73SQ M
GLUCOSE SERPL-MCNC: 111 MG/DL (ref 65–99)
HCT VFR BLD AUTO: 42.6 % (ref 42–47)
HGB BLD-MCNC: 13.7 G/DL (ref 12–16)
LYMPHOCYTES # BLD AUTO: 2 THOUSANDS/ΜL (ref 0.6–4.47)
LYMPHOCYTES NFR BLD AUTO: 18 % (ref 21–51)
MCH RBC QN AUTO: 27.1 PG (ref 26–34)
MCHC RBC AUTO-ENTMCNC: 32.2 G/DL (ref 31–37)
MCV RBC AUTO: 84 FL (ref 81–99)
MONOCYTES # BLD AUTO: 0.6 THOUSAND/ΜL (ref 0.17–1.22)
MONOCYTES NFR BLD AUTO: 5 % (ref 2–12)
NEUTROPHILS # BLD AUTO: 8.6 THOUSANDS/ΜL (ref 1.4–6.5)
NEUTS SEG NFR BLD AUTO: 75 % (ref 42–75)
PLATELET # BLD AUTO: 296 THOUSANDS/UL (ref 149–390)
PMV BLD AUTO: 9.2 FL (ref 8.6–11.7)
POTASSIUM SERPL-SCNC: 4.1 MMOL/L (ref 3.5–5.5)
RBC # BLD AUTO: 5.06 MILLION/UL (ref 3.9–5.2)
SODIUM SERPL-SCNC: 138 MMOL/L (ref 134–143)
TROPONIN I SERPL-MCNC: 0.03 NG/ML
TROPONIN I SERPL-MCNC: <0.03 NG/ML
WBC # BLD AUTO: 11.5 THOUSAND/UL (ref 4.8–10.8)

## 2021-10-09 PROCEDURE — 99285 EMERGENCY DEPT VISIT HI MDM: CPT

## 2021-10-09 PROCEDURE — 80048 BASIC METABOLIC PNL TOTAL CA: CPT | Performed by: PHYSICIAN ASSISTANT

## 2021-10-09 PROCEDURE — 85025 COMPLETE CBC W/AUTO DIFF WBC: CPT | Performed by: PHYSICIAN ASSISTANT

## 2021-10-09 PROCEDURE — 36415 COLL VENOUS BLD VENIPUNCTURE: CPT | Performed by: PHYSICIAN ASSISTANT

## 2021-10-09 PROCEDURE — 84484 ASSAY OF TROPONIN QUANT: CPT | Performed by: PHYSICIAN ASSISTANT

## 2021-10-09 PROCEDURE — 93005 ELECTROCARDIOGRAM TRACING: CPT

## 2021-10-09 PROCEDURE — 71045 X-RAY EXAM CHEST 1 VIEW: CPT

## 2021-10-09 PROCEDURE — 96374 THER/PROPH/DIAG INJ IV PUSH: CPT

## 2021-10-09 PROCEDURE — 99285 EMERGENCY DEPT VISIT HI MDM: CPT | Performed by: PHYSICIAN ASSISTANT

## 2021-10-09 RX ORDER — SODIUM CHLORIDE 9 MG/ML
3 INJECTION INTRAVENOUS
Status: DISCONTINUED | OUTPATIENT
Start: 2021-10-09 | End: 2021-10-09 | Stop reason: HOSPADM

## 2021-10-09 RX ORDER — KETOROLAC TROMETHAMINE 30 MG/ML
15 INJECTION, SOLUTION INTRAMUSCULAR; INTRAVENOUS ONCE
Status: COMPLETED | OUTPATIENT
Start: 2021-10-09 | End: 2021-10-09

## 2021-10-09 RX ORDER — ASPIRIN 81 MG/1
324 TABLET, CHEWABLE ORAL ONCE
Status: COMPLETED | OUTPATIENT
Start: 2021-10-09 | End: 2021-10-09

## 2021-10-09 RX ADMIN — ASPIRIN 81 MG CHEWABLE TABLET 324 MG: 81 TABLET CHEWABLE at 09:39

## 2021-10-09 RX ADMIN — KETOROLAC TROMETHAMINE 15 MG: 30 INJECTION, SOLUTION INTRAMUSCULAR; INTRAVENOUS at 11:10

## 2021-10-11 LAB
ATRIAL RATE: 69 BPM
ATRIAL RATE: 75 BPM
P AXIS: 30 DEGREES
P AXIS: 40 DEGREES
PR INTERVAL: 120 MS
PR INTERVAL: 126 MS
QRS AXIS: 66 DEGREES
QRS AXIS: 72 DEGREES
QRSD INTERVAL: 82 MS
QRSD INTERVAL: 88 MS
QT INTERVAL: 368 MS
QT INTERVAL: 376 MS
QTC INTERVAL: 402 MS
QTC INTERVAL: 410 MS
T WAVE AXIS: 27 DEGREES
T WAVE AXIS: 33 DEGREES
VENTRICULAR RATE: 69 BPM
VENTRICULAR RATE: 75 BPM

## 2021-10-11 PROCEDURE — 93010 ELECTROCARDIOGRAM REPORT: CPT | Performed by: INTERNAL MEDICINE

## 2021-10-20 ENCOUNTER — TELEMEDICINE (OUTPATIENT)
Dept: OBGYN CLINIC | Facility: MEDICAL CENTER | Age: 31
End: 2021-10-20
Payer: COMMERCIAL

## 2021-10-20 VITALS — BODY MASS INDEX: 54.24 KG/M2 | HEIGHT: 64 IN

## 2021-10-20 DIAGNOSIS — T83.32XA MALPOSITIONED INTRAUTERINE DEVICE (IUD), INITIAL ENCOUNTER: Primary | ICD-10-CM

## 2021-10-20 PROCEDURE — 99214 OFFICE O/P EST MOD 30 MIN: CPT | Performed by: STUDENT IN AN ORGANIZED HEALTH CARE EDUCATION/TRAINING PROGRAM

## 2021-10-20 PROCEDURE — 1036F TOBACCO NON-USER: CPT | Performed by: STUDENT IN AN ORGANIZED HEALTH CARE EDUCATION/TRAINING PROGRAM

## 2021-11-23 ENCOUNTER — OFFICE VISIT (OUTPATIENT)
Dept: OBGYN CLINIC | Facility: MEDICAL CENTER | Age: 31
End: 2021-11-23
Payer: COMMERCIAL

## 2021-11-23 VITALS
WEIGHT: 293 LBS | BODY MASS INDEX: 50.02 KG/M2 | SYSTOLIC BLOOD PRESSURE: 132 MMHG | DIASTOLIC BLOOD PRESSURE: 90 MMHG | HEIGHT: 64 IN

## 2021-11-23 DIAGNOSIS — Z01.818 PREOP TESTING: ICD-10-CM

## 2021-11-23 DIAGNOSIS — N93.9 ABNORMAL UTERINE BLEEDING: Primary | ICD-10-CM

## 2021-11-23 PROCEDURE — 99214 OFFICE O/P EST MOD 30 MIN: CPT | Performed by: STUDENT IN AN ORGANIZED HEALTH CARE EDUCATION/TRAINING PROGRAM

## 2021-11-23 PROCEDURE — 3008F BODY MASS INDEX DOCD: CPT | Performed by: STUDENT IN AN ORGANIZED HEALTH CARE EDUCATION/TRAINING PROGRAM

## 2021-11-30 ENCOUNTER — TELEPHONE (OUTPATIENT)
Dept: OBGYN CLINIC | Facility: MEDICAL CENTER | Age: 31
End: 2021-11-30

## 2021-11-30 ENCOUNTER — PREP FOR PROCEDURE (OUTPATIENT)
Dept: OBGYN CLINIC | Facility: MEDICAL CENTER | Age: 31
End: 2021-11-30

## 2021-11-30 DIAGNOSIS — N93.9 UTERINE BLEEDING: Primary | ICD-10-CM

## 2021-12-14 ENCOUNTER — APPOINTMENT (OUTPATIENT)
Dept: LAB | Facility: CLINIC | Age: 31
End: 2021-12-14
Payer: COMMERCIAL

## 2021-12-14 DIAGNOSIS — Z01.818 PREOP TESTING: ICD-10-CM

## 2021-12-14 LAB
ALBUMIN SERPL BCP-MCNC: 3.5 G/DL (ref 3.5–5)
ALP SERPL-CCNC: 106 U/L (ref 46–116)
ALT SERPL W P-5'-P-CCNC: 29 U/L (ref 12–78)
ANION GAP SERPL CALCULATED.3IONS-SCNC: 6 MMOL/L (ref 4–13)
AST SERPL W P-5'-P-CCNC: 13 U/L (ref 5–45)
BILIRUB SERPL-MCNC: 0.5 MG/DL (ref 0.2–1)
BUN SERPL-MCNC: 14 MG/DL (ref 5–25)
CALCIUM SERPL-MCNC: 9.7 MG/DL (ref 8.3–10.1)
CHLORIDE SERPL-SCNC: 106 MMOL/L (ref 100–108)
CO2 SERPL-SCNC: 26 MMOL/L (ref 21–32)
CREAT SERPL-MCNC: 0.8 MG/DL (ref 0.6–1.3)
ERYTHROCYTE [DISTWIDTH] IN BLOOD BY AUTOMATED COUNT: 15.9 % (ref 11.6–15.1)
EST. AVERAGE GLUCOSE BLD GHB EST-MCNC: 114 MG/DL
GFR SERPL CREATININE-BSD FRML MDRD: 97 ML/MIN/1.73SQ M
GLUCOSE P FAST SERPL-MCNC: 86 MG/DL (ref 65–99)
HBA1C MFR BLD: 5.6 %
HCT VFR BLD AUTO: 43.8 % (ref 34.8–46.1)
HGB BLD-MCNC: 13.6 G/DL (ref 11.5–15.4)
MCH RBC QN AUTO: 26.9 PG (ref 26.8–34.3)
MCHC RBC AUTO-ENTMCNC: 31.1 G/DL (ref 31.4–37.4)
MCV RBC AUTO: 87 FL (ref 82–98)
PLATELET # BLD AUTO: 338 THOUSANDS/UL (ref 149–390)
PMV BLD AUTO: 11.1 FL (ref 8.9–12.7)
POTASSIUM SERPL-SCNC: 4.2 MMOL/L (ref 3.5–5.3)
PROT SERPL-MCNC: 8.1 G/DL (ref 6.4–8.2)
RBC # BLD AUTO: 5.05 MILLION/UL (ref 3.81–5.12)
SODIUM SERPL-SCNC: 138 MMOL/L (ref 136–145)
WBC # BLD AUTO: 14.17 THOUSAND/UL (ref 4.31–10.16)

## 2021-12-14 PROCEDURE — 85027 COMPLETE CBC AUTOMATED: CPT

## 2021-12-14 PROCEDURE — 83036 HEMOGLOBIN GLYCOSYLATED A1C: CPT

## 2021-12-14 PROCEDURE — 36415 COLL VENOUS BLD VENIPUNCTURE: CPT

## 2021-12-14 PROCEDURE — 80053 COMPREHEN METABOLIC PANEL: CPT

## 2021-12-27 ENCOUNTER — OFFICE VISIT (OUTPATIENT)
Dept: OBGYN CLINIC | Facility: MEDICAL CENTER | Age: 31
End: 2021-12-27

## 2021-12-27 VITALS
SYSTOLIC BLOOD PRESSURE: 112 MMHG | HEIGHT: 64 IN | BODY MASS INDEX: 50.02 KG/M2 | WEIGHT: 293 LBS | DIASTOLIC BLOOD PRESSURE: 80 MMHG

## 2021-12-27 DIAGNOSIS — E66.01 CLASS 3 SEVERE OBESITY DUE TO EXCESS CALORIES WITHOUT SERIOUS COMORBIDITY WITH BODY MASS INDEX (BMI) OF 50.0 TO 59.9 IN ADULT (HCC): ICD-10-CM

## 2021-12-27 DIAGNOSIS — Z01.818 PREOPERATIVE EXAM FOR GYNECOLOGIC SURGERY: Primary | ICD-10-CM

## 2021-12-27 DIAGNOSIS — N93.9 ABNORMAL UTERINE BLEEDING: ICD-10-CM

## 2021-12-27 PROCEDURE — PREOP: Performed by: STUDENT IN AN ORGANIZED HEALTH CARE EDUCATION/TRAINING PROGRAM

## 2021-12-27 PROCEDURE — 3008F BODY MASS INDEX DOCD: CPT | Performed by: STUDENT IN AN ORGANIZED HEALTH CARE EDUCATION/TRAINING PROGRAM

## 2021-12-27 NOTE — H&P (VIEW-ONLY)
Pre-operative History and Physical  [facility] [address] [place] [address]    Assessment/Plan: Snehal Finch is a 27 y o  [place] Ar who strongly desires definitive surgical management with hysterectomy for abnormal uterine bleeding after failing oral progestin and levonorgestrel IUD  She presents today for preoperative history and physical prior to planned Da shayla assisted total laparoscopic hysterectomy, bilateral salpingectomy and cystoscopy on 1/18/2022  Abnormal uterine bleeding  - Patient has had Mirena IUD in for approximately 6 months with initial good response  It has malpositined in the lower uterine segment and she has been experiencing increasing bleeding  She has been using norethindrone acetate for heavy bleeding but has headaches  She is not a candidate for estrogen containing methods  She declines IUD removal and reinsertion  She is having heavy bleeding that soaks thorough clothes and she has to go home from work and change  - We reviewed her normal EMB from July and her US showing anteverted uterus measuring 9 2 x 3 4 x 4 8 cm without adnexal pathology or fibroids  - Andreia requests definitive surgical management with hysterectomy  She is 298% certain that she does not want to bear biologic children  She understands that there is a 20-30% risk of regret (from tubal literature) that is inversely related to age and parity  We discussed she would be at high risk of regret  She opts to proceed with definitive surgical management and has been considering this for some time  Given her BMI I recommended a robotic approach  - Planned procedure: Robotic hysterectomy, bilateral salpingectomy, cystoscopy, and all other indicated procedures    Given age and benefits of ovarian preservation I have recommended maintain ovaries in situ as long as they appear normal   -  Reviewed recent normal CBC CMP and Hemoglobin A1C   -  She takes Propranolol for migraines and hypertension  She takes Lipitor for hyperlipidemia  She has no prior abdominal surgeries  She tolerated anesthesia for knee surgery  She does not smoke  - I discussed with patient indication, risks, benefits and alternatives of surgery  We discussed possibility of bleeding requiring blood transfusion, life-threatening infections requiring additional procedures, injuries to surrounding organs such as bladder, ureters, gastrointestinal tract and or neurovascular structures  Additionally, we discussed general risks associated to stress of surgery such as venous thromboembolism, acute myocardial events and stroke  All questions answered to patient's satisfaction     - A preoperative history and physical was completed today  Preoperative and postoperative instructions were reviewed  The Hibiclens wash was given  Presurgery Ensure drink given  Written informed consent obtained today and scanned into chart  - Vaccination status: The patient has received her COVID vaccine and booster shot  Diagnoses and all orders for this visit:    Preoperative exam for gynecologic surgery    Abnormal uterine bleeding    Class 3 severe obesity due to excess calories without serious comorbidity with body mass index (BMI) of 50 0 to 59 9 in adult [facility])          Subjective:   Evie Pederson is a 27 y o   female  CC: Preop    HPI: Monroe French presents for preoperative history and physical  She reports no updates or changes to her health  ROS: Review of Systems   Constitutional: Negative for chills and fever  Respiratory: Negative for cough and shortness of breath  Cardiovascular: Negative for chest pain and leg swelling  Gastrointestinal: Negative for abdominal pain, nausea and vomiting  Genitourinary: Negative for dysuria, frequency and urgency  Neurological: Negative for dizziness, light-headedness and headaches         PFSH: The following portions of the patient's history were reviewed and updated as appropriate: allergies, current medications, past family history, past medical history, obstetric history, gynecologic history, past social history, past surgical history and problem list        Objective:  /80 (BP Location: Left arm, Patient Position: Sitting, Cuff Size: Adult)   Ht 5' 4" (1 626 m)   Wt (!) 148 kg (327 lb)   BMI 56 13 kg/m²    Physical Exam  Constitutional:       Appearance: Normal appearance  HENT:      Head: Normocephalic and atraumatic  Mouth/Throat:      Mouth: Mucous membranes are moist       Pharynx: Oropharynx is clear  No oropharyngeal exudate  Cardiovascular:      Rate and Rhythm: Normal rate and regular rhythm  Pulses: Normal pulses  Heart sounds: Normal heart sounds  No murmur heard  No friction rub  No gallop  Pulmonary:      Effort: Pulmonary effort is normal  No respiratory distress  Breath sounds: Normal breath sounds  No wheezing, rhonchi or rales  Abdominal:      General: Abdomen is flat  There is no distension  Palpations: Abdomen is soft  There is no mass  Tenderness: There is no abdominal tenderness  Hernia: No hernia is present  Skin:     General: Skin is warm and dry  Neurological:      General: No focal deficit present  Mental Status: She is alert and oriented to person, place, and time     Psychiatric:         Mood and Affect: Mood normal          Behavior: Behavior normal            Darshan oHok MD  [facility]  12/27/2021 12:52 PM

## 2022-01-03 DIAGNOSIS — N93.9 ABNORMAL UTERINE BLEEDING: ICD-10-CM

## 2022-01-11 ENCOUNTER — APPOINTMENT (OUTPATIENT)
Dept: LAB | Facility: CLINIC | Age: 32
End: 2022-01-11
Payer: COMMERCIAL

## 2022-01-11 ENCOUNTER — LAB REQUISITION (OUTPATIENT)
Dept: LAB | Facility: HOSPITAL | Age: 32
End: 2022-01-11
Payer: COMMERCIAL

## 2022-01-11 DIAGNOSIS — Z01.818 ENCOUNTER FOR OTHER PREPROCEDURAL EXAMINATION: ICD-10-CM

## 2022-01-11 DIAGNOSIS — Z01.818 PREOP TESTING: ICD-10-CM

## 2022-01-11 DIAGNOSIS — Z01.818 PRE-OP TESTING: ICD-10-CM

## 2022-01-11 LAB
ABO GROUP BLD: NORMAL
BASOPHILS # BLD AUTO: 0.09 THOUSANDS/ΜL (ref 0–0.1)
BASOPHILS NFR BLD AUTO: 1 % (ref 0–1)
BLD GP AB SCN SERPL QL: NEGATIVE
EOSINOPHIL # BLD AUTO: 0.09 THOUSAND/ΜL (ref 0–0.61)
EOSINOPHIL NFR BLD AUTO: 1 % (ref 0–6)
ERYTHROCYTE [DISTWIDTH] IN BLOOD BY AUTOMATED COUNT: 15.6 % (ref 11.6–15.1)
HCT VFR BLD AUTO: 46.5 % (ref 34.8–46.1)
HGB BLD-MCNC: 15 G/DL (ref 11.5–15.4)
IMM GRANULOCYTES # BLD AUTO: 0.07 THOUSAND/UL (ref 0–0.2)
IMM GRANULOCYTES NFR BLD AUTO: 1 % (ref 0–2)
LYMPHOCYTES # BLD AUTO: 1.95 THOUSANDS/ΜL (ref 0.6–4.47)
LYMPHOCYTES NFR BLD AUTO: 13 % (ref 14–44)
MCH RBC QN AUTO: 27.5 PG (ref 26.8–34.3)
MCHC RBC AUTO-ENTMCNC: 32.3 G/DL (ref 31.4–37.4)
MCV RBC AUTO: 85 FL (ref 82–98)
MONOCYTES # BLD AUTO: 0.89 THOUSAND/ΜL (ref 0.17–1.22)
MONOCYTES NFR BLD AUTO: 6 % (ref 4–12)
NEUTROPHILS # BLD AUTO: 12.43 THOUSANDS/ΜL (ref 1.85–7.62)
NEUTS SEG NFR BLD AUTO: 78 % (ref 43–75)
NRBC BLD AUTO-RTO: 0 /100 WBCS
PLATELET # BLD AUTO: 365 THOUSANDS/UL (ref 149–390)
PMV BLD AUTO: 11.7 FL (ref 8.9–12.7)
RBC # BLD AUTO: 5.46 MILLION/UL (ref 3.81–5.12)
RH BLD: POSITIVE
SPECIMEN EXPIRATION DATE: NORMAL
WBC # BLD AUTO: 15.52 THOUSAND/UL (ref 4.31–10.16)

## 2022-01-11 PROCEDURE — 86850 RBC ANTIBODY SCREEN: CPT | Performed by: STUDENT IN AN ORGANIZED HEALTH CARE EDUCATION/TRAINING PROGRAM

## 2022-01-11 PROCEDURE — 86900 BLOOD TYPING SEROLOGIC ABO: CPT | Performed by: STUDENT IN AN ORGANIZED HEALTH CARE EDUCATION/TRAINING PROGRAM

## 2022-01-11 PROCEDURE — 86901 BLOOD TYPING SEROLOGIC RH(D): CPT | Performed by: STUDENT IN AN ORGANIZED HEALTH CARE EDUCATION/TRAINING PROGRAM

## 2022-01-11 PROCEDURE — 85025 COMPLETE CBC W/AUTO DIFF WBC: CPT

## 2022-01-11 PROCEDURE — 36415 COLL VENOUS BLD VENIPUNCTURE: CPT

## 2022-01-12 ENCOUNTER — TELEPHONE (OUTPATIENT)
Dept: OBGYN CLINIC | Facility: MEDICAL CENTER | Age: 32
End: 2022-01-12

## 2022-01-17 ENCOUNTER — ANESTHESIA EVENT (OUTPATIENT)
Dept: PERIOP | Facility: HOSPITAL | Age: 32
End: 2022-01-17
Payer: COMMERCIAL

## 2022-01-18 ENCOUNTER — HOSPITAL ENCOUNTER (OUTPATIENT)
Facility: HOSPITAL | Age: 32
Setting detail: OUTPATIENT SURGERY
Discharge: HOME | End: 2022-01-18
Attending: STUDENT IN AN ORGANIZED HEALTH CARE EDUCATION/TRAINING PROGRAM | Admitting: STUDENT IN AN ORGANIZED HEALTH CARE EDUCATION/TRAINING PROGRAM
Payer: COMMERCIAL

## 2022-01-18 ENCOUNTER — ANESTHESIA (OUTPATIENT)
Dept: PERIOP | Facility: HOSPITAL | Age: 32
End: 2022-01-18
Payer: COMMERCIAL

## 2022-01-18 VITALS
TEMPERATURE: 96.6 F | BODY MASS INDEX: 50.02 KG/M2 | DIASTOLIC BLOOD PRESSURE: 97 MMHG | HEART RATE: 84 BPM | SYSTOLIC BLOOD PRESSURE: 148 MMHG | RESPIRATION RATE: 16 BRPM | HEIGHT: 64 IN | OXYGEN SATURATION: 98 % | WEIGHT: 293 LBS

## 2022-01-18 DIAGNOSIS — N93.9 UTERINE BLEEDING: ICD-10-CM

## 2022-01-18 DIAGNOSIS — Z01.818 PRE-OP TESTING: ICD-10-CM

## 2022-01-18 DIAGNOSIS — Z90.710 S/P HYSTERECTOMY: Primary | ICD-10-CM

## 2022-01-18 LAB
EXT PREGNANCY TEST URINE: NEGATIVE
EXT. CONTROL: NORMAL
GLUCOSE SERPL-MCNC: 107 MG/DL (ref 65–140)
GLUCOSE SERPL-MCNC: 180 MG/DL (ref 65–140)

## 2022-01-18 PROCEDURE — 81025 URINE PREGNANCY TEST: CPT | Performed by: ANESTHESIOLOGY

## 2022-01-18 PROCEDURE — 58571 TLH W/T/O 250 G OR LESS: CPT | Performed by: STUDENT IN AN ORGANIZED HEALTH CARE EDUCATION/TRAINING PROGRAM

## 2022-01-18 PROCEDURE — 88307 TISSUE EXAM BY PATHOLOGIST: CPT | Performed by: PATHOLOGY

## 2022-01-18 PROCEDURE — S2900 ROBOTIC SURGICAL SYSTEM: HCPCS | Performed by: PHYSICIAN ASSISTANT

## 2022-01-18 PROCEDURE — 82948 REAGENT STRIP/BLOOD GLUCOSE: CPT

## 2022-01-18 PROCEDURE — 58571 TLH W/T/O 250 G OR LESS: CPT | Performed by: PHYSICIAN ASSISTANT

## 2022-01-18 RX ORDER — MAGNESIUM HYDROXIDE 1200 MG/15ML
LIQUID ORAL AS NEEDED
Status: DISCONTINUED | OUTPATIENT
Start: 2022-01-18 | End: 2022-01-18 | Stop reason: HOSPADM

## 2022-01-18 RX ORDER — OXYCODONE HYDROCHLORIDE 5 MG/1
5 TABLET ORAL EVERY 4 HOURS PRN
Status: DISCONTINUED | OUTPATIENT
Start: 2022-01-18 | End: 2022-01-18 | Stop reason: HOSPADM

## 2022-01-18 RX ORDER — DIPHENHYDRAMINE HYDROCHLORIDE 50 MG/ML
INJECTION INTRAMUSCULAR; INTRAVENOUS AS NEEDED
Status: DISCONTINUED | OUTPATIENT
Start: 2022-01-18 | End: 2022-01-18

## 2022-01-18 RX ORDER — LIDOCAINE HYDROCHLORIDE 10 MG/ML
INJECTION, SOLUTION EPIDURAL; INFILTRATION; INTRACAUDAL; PERINEURAL AS NEEDED
Status: DISCONTINUED | OUTPATIENT
Start: 2022-01-18 | End: 2022-01-18

## 2022-01-18 RX ORDER — METOCLOPRAMIDE HYDROCHLORIDE 5 MG/ML
INJECTION INTRAMUSCULAR; INTRAVENOUS AS NEEDED
Status: DISCONTINUED | OUTPATIENT
Start: 2022-01-18 | End: 2022-01-18

## 2022-01-18 RX ORDER — FENTANYL CITRATE/PF 50 MCG/ML
25 SYRINGE (ML) INJECTION
Status: DISCONTINUED | OUTPATIENT
Start: 2022-01-18 | End: 2022-01-18 | Stop reason: HOSPADM

## 2022-01-18 RX ORDER — SODIUM CHLORIDE, SODIUM LACTATE, POTASSIUM CHLORIDE, CALCIUM CHLORIDE 600; 310; 30; 20 MG/100ML; MG/100ML; MG/100ML; MG/100ML
INJECTION, SOLUTION INTRAVENOUS CONTINUOUS PRN
Status: DISCONTINUED | OUTPATIENT
Start: 2022-01-18 | End: 2022-01-18

## 2022-01-18 RX ORDER — ACETAMINOPHEN 325 MG/1
650 TABLET ORAL EVERY 6 HOURS
Qty: 40 TABLET | Refills: 0 | Status: SHIPPED | OUTPATIENT
Start: 2022-01-18 | End: 2022-01-23

## 2022-01-18 RX ORDER — NEOSTIGMINE METHYLSULFATE 1 MG/ML
INJECTION INTRAVENOUS AS NEEDED
Status: DISCONTINUED | OUTPATIENT
Start: 2022-01-18 | End: 2022-01-18

## 2022-01-18 RX ORDER — ROCURONIUM BROMIDE 10 MG/ML
INJECTION, SOLUTION INTRAVENOUS AS NEEDED
Status: DISCONTINUED | OUTPATIENT
Start: 2022-01-18 | End: 2022-01-18

## 2022-01-18 RX ORDER — GLYCOPYRROLATE 0.2 MG/ML
INJECTION INTRAMUSCULAR; INTRAVENOUS AS NEEDED
Status: DISCONTINUED | OUTPATIENT
Start: 2022-01-18 | End: 2022-01-18

## 2022-01-18 RX ORDER — IBUPROFEN 600 MG/1
600 TABLET ORAL EVERY 6 HOURS
Qty: 30 TABLET | Refills: 0 | Status: SHIPPED | OUTPATIENT
Start: 2022-01-18 | End: 2022-03-14

## 2022-01-18 RX ORDER — SODIUM CHLORIDE 9 MG/ML
INJECTION, SOLUTION INTRAVENOUS CONTINUOUS PRN
Status: DISCONTINUED | OUTPATIENT
Start: 2022-01-18 | End: 2022-01-18

## 2022-01-18 RX ORDER — OXYCODONE HYDROCHLORIDE 5 MG/1
5 TABLET ORAL EVERY 8 HOURS
Qty: 10 TABLET | Refills: 0 | Status: SHIPPED | OUTPATIENT
Start: 2022-01-18 | End: 2022-01-22

## 2022-01-18 RX ORDER — KETOROLAC TROMETHAMINE 30 MG/ML
INJECTION, SOLUTION INTRAMUSCULAR; INTRAVENOUS AS NEEDED
Status: DISCONTINUED | OUTPATIENT
Start: 2022-01-18 | End: 2022-01-18

## 2022-01-18 RX ORDER — ONDANSETRON 2 MG/ML
4 INJECTION INTRAMUSCULAR; INTRAVENOUS EVERY 6 HOURS PRN
Status: DISCONTINUED | OUTPATIENT
Start: 2022-01-18 | End: 2022-01-18 | Stop reason: HOSPADM

## 2022-01-18 RX ORDER — CLINDAMYCIN PHOSPHATE 900 MG/50ML
900 INJECTION INTRAVENOUS ONCE
Status: COMPLETED | OUTPATIENT
Start: 2022-01-18 | End: 2022-01-18

## 2022-01-18 RX ORDER — DEXAMETHASONE SODIUM PHOSPHATE 10 MG/ML
INJECTION, SOLUTION INTRAMUSCULAR; INTRAVENOUS AS NEEDED
Status: DISCONTINUED | OUTPATIENT
Start: 2022-01-18 | End: 2022-01-18

## 2022-01-18 RX ORDER — MIDAZOLAM HYDROCHLORIDE 2 MG/2ML
INJECTION, SOLUTION INTRAMUSCULAR; INTRAVENOUS AS NEEDED
Status: DISCONTINUED | OUTPATIENT
Start: 2022-01-18 | End: 2022-01-18

## 2022-01-18 RX ORDER — ONDANSETRON 2 MG/ML
4 INJECTION INTRAMUSCULAR; INTRAVENOUS ONCE
Status: DISCONTINUED | OUTPATIENT
Start: 2022-01-18 | End: 2022-01-18 | Stop reason: HOSPADM

## 2022-01-18 RX ORDER — IBUPROFEN 600 MG/1
600 TABLET ORAL EVERY 6 HOURS PRN
Status: DISCONTINUED | OUTPATIENT
Start: 2022-01-18 | End: 2022-01-18 | Stop reason: HOSPADM

## 2022-01-18 RX ORDER — FENTANYL CITRATE 50 UG/ML
INJECTION, SOLUTION INTRAMUSCULAR; INTRAVENOUS AS NEEDED
Status: DISCONTINUED | OUTPATIENT
Start: 2022-01-18 | End: 2022-01-18

## 2022-01-18 RX ORDER — BUPIVACAINE HYDROCHLORIDE 5 MG/ML
INJECTION, SOLUTION EPIDURAL; INTRACAUDAL AS NEEDED
Status: DISCONTINUED | OUTPATIENT
Start: 2022-01-18 | End: 2022-01-18 | Stop reason: HOSPADM

## 2022-01-18 RX ORDER — ACETAMINOPHEN 325 MG/1
650 TABLET ORAL EVERY 6 HOURS PRN
Status: DISCONTINUED | OUTPATIENT
Start: 2022-01-18 | End: 2022-01-18 | Stop reason: HOSPADM

## 2022-01-18 RX ORDER — ONDANSETRON 2 MG/ML
INJECTION INTRAMUSCULAR; INTRAVENOUS AS NEEDED
Status: DISCONTINUED | OUTPATIENT
Start: 2022-01-18 | End: 2022-01-18

## 2022-01-18 RX ORDER — PROPOFOL 10 MG/ML
INJECTION, EMULSION INTRAVENOUS AS NEEDED
Status: DISCONTINUED | OUTPATIENT
Start: 2022-01-18 | End: 2022-01-18

## 2022-01-18 RX ORDER — SODIUM CHLORIDE, SODIUM LACTATE, POTASSIUM CHLORIDE, CALCIUM CHLORIDE 600; 310; 30; 20 MG/100ML; MG/100ML; MG/100ML; MG/100ML
50 INJECTION, SOLUTION INTRAVENOUS CONTINUOUS
Status: DISCONTINUED | OUTPATIENT
Start: 2022-01-18 | End: 2022-01-18 | Stop reason: HOSPADM

## 2022-01-18 RX ORDER — HYDROMORPHONE HCL/PF 1 MG/ML
SYRINGE (ML) INJECTION AS NEEDED
Status: DISCONTINUED | OUTPATIENT
Start: 2022-01-18 | End: 2022-01-18

## 2022-01-18 RX ORDER — GENTAMICIN SULFATE 40 MG/ML
INJECTION, SOLUTION INTRAMUSCULAR; INTRAVENOUS AS NEEDED
Status: DISCONTINUED | OUTPATIENT
Start: 2022-01-18 | End: 2022-01-18

## 2022-01-18 RX ORDER — HYDROMORPHONE HCL/PF 1 MG/ML
0.5 SYRINGE (ML) INJECTION
Status: DISCONTINUED | OUTPATIENT
Start: 2022-01-18 | End: 2022-01-18 | Stop reason: HOSPADM

## 2022-01-18 RX ADMIN — ROCURONIUM BROMIDE 20 MG: 50 INJECTION, SOLUTION INTRAVENOUS at 11:41

## 2022-01-18 RX ADMIN — MIDAZOLAM 2 MG: 1 INJECTION INTRAMUSCULAR; INTRAVENOUS at 10:36

## 2022-01-18 RX ADMIN — DEXAMETHASONE SODIUM PHOSPHATE 10 MG: 10 INJECTION, SOLUTION INTRAMUSCULAR; INTRAVENOUS at 10:43

## 2022-01-18 RX ADMIN — SODIUM CHLORIDE: 0.9 INJECTION, SOLUTION INTRAVENOUS at 10:50

## 2022-01-18 RX ADMIN — NEOSTIGMINE METHYLSULFATE 5 MG: 1 INJECTION INTRAVENOUS at 12:34

## 2022-01-18 RX ADMIN — SODIUM CHLORIDE, SODIUM LACTATE, POTASSIUM CHLORIDE, AND CALCIUM CHLORIDE 50 ML/HR: .6; .31; .03; .02 INJECTION, SOLUTION INTRAVENOUS at 13:36

## 2022-01-18 RX ADMIN — KETOROLAC TROMETHAMINE 30 MG: 30 INJECTION, SOLUTION INTRAMUSCULAR; INTRAVENOUS at 12:34

## 2022-01-18 RX ADMIN — PROPOFOL 200 MG: 10 INJECTION, EMULSION INTRAVENOUS at 10:42

## 2022-01-18 RX ADMIN — CLINDAMYCIN PHOSPHATE 900 MG: 900 INJECTION, SOLUTION INTRAVENOUS at 10:48

## 2022-01-18 RX ADMIN — PHENYLEPHRINE HYDROCHLORIDE 50 MCG/MIN: 10 INJECTION INTRAVENOUS at 11:26

## 2022-01-18 RX ADMIN — FENTANYL CITRATE 100 MCG: 50 INJECTION INTRAMUSCULAR; INTRAVENOUS at 10:42

## 2022-01-18 RX ADMIN — SODIUM CHLORIDE, SODIUM LACTATE, POTASSIUM CHLORIDE, AND CALCIUM CHLORIDE: .6; .31; .03; .02 INJECTION, SOLUTION INTRAVENOUS at 09:43

## 2022-01-18 RX ADMIN — LIDOCAINE HYDROCHLORIDE 50 MG: 10 INJECTION, SOLUTION EPIDURAL; INFILTRATION; INTRACAUDAL; PERINEURAL at 10:42

## 2022-01-18 RX ADMIN — SODIUM CHLORIDE, SODIUM LACTATE, POTASSIUM CHLORIDE, AND CALCIUM CHLORIDE 50 ML/HR: .6; .31; .03; .02 INJECTION, SOLUTION INTRAVENOUS at 09:47

## 2022-01-18 RX ADMIN — HYDROMORPHONE HYDROCHLORIDE 0.5 MG: 1 INJECTION, SOLUTION INTRAMUSCULAR; INTRAVENOUS; SUBCUTANEOUS at 12:04

## 2022-01-18 RX ADMIN — GLYCOPYRROLATE 0.8 MG: 0.2 INJECTION, SOLUTION INTRAMUSCULAR; INTRAVENOUS at 12:34

## 2022-01-18 RX ADMIN — ROCURONIUM BROMIDE 50 MG: 50 INJECTION, SOLUTION INTRAVENOUS at 10:43

## 2022-01-18 RX ADMIN — GENTAMICIN SULFATE 270 MG: 40 INJECTION, SOLUTION INTRAMUSCULAR; INTRAVENOUS at 10:59

## 2022-01-18 RX ADMIN — METOCLOPRAMIDE HYDROCHLORIDE 10 MG: 5 INJECTION INTRAMUSCULAR; INTRAVENOUS at 11:22

## 2022-01-18 RX ADMIN — DIPHENHYDRAMINE HYDROCHLORIDE 12.5 MG: 50 INJECTION, SOLUTION INTRAMUSCULAR; INTRAVENOUS at 11:22

## 2022-01-18 RX ADMIN — ONDANSETRON 4 MG: 2 INJECTION INTRAMUSCULAR; INTRAVENOUS at 10:36

## 2022-01-18 RX ADMIN — HYDROMORPHONE HYDROCHLORIDE 0.5 MG: 1 INJECTION, SOLUTION INTRAMUSCULAR; INTRAVENOUS; SUBCUTANEOUS at 11:51

## 2022-01-18 NOTE — ANESTHESIA PREPROCEDURE EVALUATION
Procedure:  HYSTERECTOMY LAPAROSCOPIC TOTAL ([address] [place] [address]) W/ ROBOTICS, BILATERAL SALPINGECTOMY (N/A Uterus)  ROBOTIC CYSTOSCOPY (N/A Bladder)    Relevant Problems   CARDIO   (+) Combined hyperlipidemia   (+) Hypertension   (+) Migraine      NEURO/PSYCH   (+) Migraine      PULMONARY   (+) Exercise-induced asthma      Other   (+) Class 3 severe obesity due to excess calories without serious comorbidity with body mass index (BMI) of 50 0 to 59 9 in [facility])        Physical Exam    Airway    Mallampati score: II  TM Distance: >3 FB  Neck ROM: full     Dental   No notable dental hx     Cardiovascular  Cardiovascular exam normal    Pulmonary  Pulmonary exam normal     Other Findings        Anesthesia Plan  ASA Score- 3     Anesthesia Type- general with ASA Monitors  Additional Monitors:   Airway Plan: ETT  Comment: Eras protocol finished at 800  Plan Factors-    Chart reviewed  EKG reviewed  Patient summary reviewed  Patient is not a current smoker  Induction- intravenous  Postoperative Plan- Plan for postoperative opioid use  Informed Consent- Anesthetic plan and risks discussed with patient  I personally reviewed this patient with the CRNA  Discussed and agreed on the Anesthesia Plan with the CRNA  Rossy Duran

## 2022-01-18 NOTE — OP NOTE
PERATIVE REPORT  PATIENT NAME: Brittani Schneider    :  1990  MRN: [id number]  Pt Location: [facility] OR ROOM 14    SURGERY DATE: 2022    Surgeon(s) and Role:     Rome Sargent MD - Primary     * Christoph Nunez PA-C - Assisting     * Des Pacheco MD - Assisting     * Elif Jose MD - Assisting    Preop Diagnosis:  Uterine bleeding [N93 9]    Post-Op Diagnosis Codes:     * Uterine bleeding [N93 9]    Procedure(s) (LRB):  HYSTERECTOMY LAPAROSCOPIC TOTAL ([address]) W/ ROBOTICS, BILATERAL SALPINGECTOMY (N/A)  ROBOTIC CYSTOSCOPY (N/A)    Specimen(s):  ID Type Source Tests Collected by Time Destination   1 : uterus, cervix, b/l fallopian tubes  Tissue Uterus TISSUE EXAM Andrés Sargent MD 2022 [id number]        Estimated Blood Loss:   100 ml    Drains:  NG/OG/Enteral Tube Orogastric 18 Fr Center mouth (Active)   Number of days: 0       Urethral Catheter Non-latex 16 Fr  (Active)   Number of days: 0       Anesthesia Type:   General    Operative Indications:  Uterine bleeding [N93 9]      Operative Findings:  Uterus bilateral fallopian tubes and ovaries were normal in appearance  Mirena IUD in uterus was sent with specimen  Complications:   None    Procedure and Technique:  The patient is a 27y o  year-old with a history of heavy menstrual bleeding failing oral progestin and levonorgestrel IUD  She requested definitive surgical management  Risks, benefits, and alternatives were reviewed in the office  She opted to proceed with surgical management  Operative Findings:  Normal bowel, omentum, liver, gallbladder  Normal appearing upper abdomen  Normal appearing uterus, bilateral fallopian tubes and ovaries  Mirena IUD strings visualized  IUD was sent with surgical specimen  Complications:   None    Procedure and Technique:  The patient was brought to the Operating Room where general anesthesia was induced  The abdomen, vagina and perineum were prepped and draped   The surgical pause was completed  Vaginal retractors were placed  The cervix was grasped with a single tooth tenaculum  The cervix was easily dilated and the uterus sounded to 10 cm  A meidum Sonal ring was placed around the cervix  Then an 10 cm CALOS cannula was placed in the uterine cavity without difficulty and the vaginal balloon inflated  A Bolanos catheter was placed in a sterile fashion  A point incision was made in the umbilicus, and with the abdomen under anterior traction with two irvin-umbilical towel clamps, a Veress needle was inserted into the abdominal cavity with the carbon dioxide on low flow  Immediate pressure drop to 0 mmHg and diffuse abdominal distention indicated intraperitoneal placement  The peritoneal cavity was then insufflated with carbon dioxide on high flow to maintain a pressure of 15 mm Hg throughout the case  An 8 mm incision was made 25 cm above the pubic symphysis at the midline, through which the robotic trocar was introduced into the abdominal cavity  The laparoscope was then inserted and the peritoneal cavity explored with the above findings  There was no evidence of visceral injury  Additional robotic ports and a 11 mm AirSeal assistant port were placed under direct visualization  With the patient in steep Trendelenburg, the Da shayla robot was docked to the robotic ports and the instruments were placed under direct visualization  A laparoscopic "fan" bowel retractor was utilized to maintain visualization which was difficult due to patient habitus  The ureters were then identified retroperitoneally, coursing well posterior to the ovarian vessels  The bilateral fallopian tubes were removed bilaterally using the synchroseal  The round ligaments were isolated, cauterized and cut  The posterior peritoneum was dropped to the level of the koh ring   The vesico-uterine peritoneum was incised, and the bladder dissected from the cervix and upper vagina in a meticulous fashion to the level of the koh ring      The uterine vessels were then skeletonized bilaterally and coagulated at the level of the koh ring using bipolar current, then divided  With the bladder mobilized anteriorally and the rectum well away posteriorally, using the monopolar device, an incision was made in the anterior upper vagina at the level of the cervical-vaginal junction  The incision was continued circumferentially around the upper vagina, controlling upper vaginal blood supply laterally using the biopolar  This allowed completely amputation of the specimen  The vaginal balloon was removed  The uterus, cervix, and bilateral fallopian tubes were then removed en bloc transvaginally    The vaginal cuff was closed using a running stitch of 2-0 PDO Stratafix  Airtight closure an excellent hemostasis was obtained  Copious irrigation was used and excellent hemostasis was confirmed at low intraperitoneal pressures  The robotic instruments were removed, and the robot undocked  Pneumoperitoneum was completely released with the assistance of Valsalva maneuvers  All trocars were removed and the skin was closed using a subcuticular stitch of 4-0 Monocryl and Histoacryl was applied to all incisions  All sponge, needle and instrument counts were correct x2  Anesthesia was reversed and the patient was taken to the PACU in a stable condition      I was present for the entire procedure, A qualified resident physician was not available and A physician assistant was required during the procedure for retraction tissue handling,dissection and suturing    Patient Disposition:  PACU     SIGNATURE: Tomeka Srinivasan MD  DATE: January 18, 2022  TIME: 12:53 PM

## 2022-01-18 NOTE — DISCHARGE INSTRUCTIONS
Hysterectomy   WHAT YOU NEED TO KNOW:   A hysterectomy is surgery to remove your uterus  Your ovaries, fallopian tubes, cervix, or part of your vagina may also need to be removed  The organs and tissue that will be removed depends on your medical condition  DISCHARGE INSTRUCTIONS:   Call 911 for any of the following:   · You feel lightheaded, short of breath, and have chest pain  · You cough up blood  Seek care immediately:   · Your arm or leg feels warm, tender, and painful  It may look swollen and red  · You have increasing abdominal or pelvic pain  Contact your healthcare provider or gynecologist if:   · You have heavy vaginal bleeding that fills 1 or more sanitary pads in 1 hour  · You have a fever  · You have nausea or are vomiting  · You feel pain or burning when you urinate, or you have trouble urinating  · You have pus or a foul-smelling odor coming from your vagina  · Your wound is red, swollen, or draining pus  · You feel pressure in your rectum  · You have questions or concerns about your condition or care  Medicines:   · Prescription pain medicine  may be given  Ask your healthcare provider how to take this medicine safely  · Stool softeners  help treat or prevent constipation  · Take your medicine as directed  Contact your healthcare provider if you think your medicine is not helping or if you have side effects  Tell him or her if you are allergic to any medicine  Keep a list of the medicines, vitamins, and herbs you take  Include the amounts, and when and why you take them  Bring the list or the pill bottles to follow-up visits  Carry your medicine list with you in case of an emergency  Activity:     · Rest as needed  Get up and move around as directed to help prevent blood clots  Start with short walks and slowly increase the distance every day  Limit the number of times you climb stairs to 2 times each day   Plan most of your daily activities on one level of your home  · Do not lift objects heavier than 10 pounds for 6 weeks  Avoid strenuous activity for 2 weeks  · Do not strain during bowel movements  High-fiber foods and extra liquids can help you prevent constipation  Examples of high-fiber foods are fruit and bran  Prune juice and water are good liquids to drink  · Do not have sex, use tampons, or douche for up to 8 weeks  Ask your healthcare provider if it is okay to take a tub bath  · Do not go in pools or hot tubs for 6 weeks or as directed  · Ask when it is safe for you to drive, return to work, and return to other regular activities  Wound care: May shower daily and wash  the incisions with soap and water  Check your incision every day for redness, swelling, or pus  Deep breathing:  Take deep breaths and cough 10 times each hour  This will decrease your risk for a lung infection  Take a deep breath and hold it for as long as you can  Let the air out and then cough strongly  Deep breaths help open your airway  You may be given an incentive spirometer to help you take deep breaths  Put the plastic piece in your mouth and take a slow, deep breath, then let the air out and cough  Repeat these steps 10 times every hour  Get support: This surgery may be life-changing for you and your family  You will no longer be able to get pregnant  Sudden changes in the levels of your hormones may occur and cause mood swings and depression  You may feel angry, sad, or frightened, or cry frequently and unexpectedly  These feelings are normal  Talk to your healthcare provider about where you can get support  You can also ask if hormone replacement medicine is right for you  Follow up with your healthcare provider or gynecologist as directed: You may need to return to have stitches removed, and for other tests  Write down your questions so you remember to ask them during your visits    © Copyright [employer] 2021 Information is for End User's use only and may not be sold, redistributed or otherwise used for commercial purposes  All illustrations and images included in CareNotes® are the copyrighted property of A D A M , Inc  or Arthur [employer]  The above information is an  only  It is not intended as medical advice for individual conditions or treatments  Talk to your doctor, nurse or pharmacist before following any medical regimen to see if it is safe and effective for you

## 2022-01-18 NOTE — INTERVAL H&P NOTE
H&P reviewed  After examining the patient I find no changes in the patients condition since the H&P had been written  Vitals:    01/18/22 0925   BP: 142/85   Pulse: 80   Resp: 16   Temp: (!) 95 9 °F (35 5 °C)   SpO2: 96%     Physical examination is normal including musculoskeletal assessment      Opal Abdalla MD  [address] [place] [address]  1/18/2022 10:00 AM

## 2022-01-18 NOTE — ANESTHESIA POSTPROCEDURE EVALUATION
Post-Op Assessment Note    CV Status:  Stable    Pain management: adequate     Mental Status:  Alert and awake   Hydration Status:  Euvolemic   PONV Controlled:  Controlled   Airway Patency:  Patent      Post Op Vitals Reviewed: Yes      Staff: CRNA         No complications documented      BP   143/76   Temp  97 6   Pulse  67   Resp   18   SpO2   96 face mask

## 2022-02-01 DIAGNOSIS — I10 ESSENTIAL HYPERTENSION: ICD-10-CM

## 2022-02-01 DIAGNOSIS — G43.809 OTHER MIGRAINE WITHOUT STATUS MIGRAINOSUS, NOT INTRACTABLE: Primary | ICD-10-CM

## 2022-02-01 DIAGNOSIS — G43.809 OTHER MIGRAINE WITHOUT STATUS MIGRAINOSUS, NOT INTRACTABLE: ICD-10-CM

## 2022-02-01 RX ORDER — PROPRANOLOL HYDROCHLORIDE 20 MG/1
20 TABLET ORAL EVERY 12 HOURS SCHEDULED
Qty: 180 TABLET | Refills: 1 | Status: SHIPPED | OUTPATIENT
Start: 2022-02-01 | End: 2022-02-07 | Stop reason: SDUPTHER

## 2022-02-01 RX ORDER — AMITRIPTYLINE HYDROCHLORIDE 25 MG/1
25 TABLET, FILM COATED ORAL
Qty: 30 TABLET | Refills: 2 | Status: SHIPPED | OUTPATIENT
Start: 2022-02-01 | End: 2022-02-01 | Stop reason: SDUPTHER

## 2022-02-01 RX ORDER — AMITRIPTYLINE HYDROCHLORIDE 25 MG/1
25 TABLET, FILM COATED ORAL
Qty: 30 TABLET | Refills: 2 | Status: SHIPPED | OUTPATIENT
Start: 2022-02-01 | End: 2022-05-10 | Stop reason: SDUPTHER

## 2022-02-07 ENCOUNTER — OFFICE VISIT (OUTPATIENT)
Dept: OBGYN CLINIC | Facility: CLINIC | Age: 32
End: 2022-02-07

## 2022-02-07 VITALS
WEIGHT: 293 LBS | BODY MASS INDEX: 50.02 KG/M2 | SYSTOLIC BLOOD PRESSURE: 120 MMHG | HEIGHT: 64 IN | DIASTOLIC BLOOD PRESSURE: 70 MMHG

## 2022-02-07 DIAGNOSIS — Z90.710 S/P HYSTERECTOMY: ICD-10-CM

## 2022-02-07 DIAGNOSIS — I10 ESSENTIAL HYPERTENSION: ICD-10-CM

## 2022-02-07 PROBLEM — T83.32XA MALPOSITIONED INTRAUTERINE DEVICE: Status: RESOLVED | Noted: 2021-10-20 | Resolved: 2022-02-07

## 2022-02-07 PROBLEM — N93.9 ABNORMAL UTERINE BLEEDING: Status: RESOLVED | Noted: 2020-11-05 | Resolved: 2022-02-07

## 2022-02-07 PROCEDURE — 99024 POSTOP FOLLOW-UP VISIT: CPT | Performed by: STUDENT IN AN ORGANIZED HEALTH CARE EDUCATION/TRAINING PROGRAM

## 2022-02-07 RX ORDER — PROPRANOLOL HYDROCHLORIDE 20 MG/1
20 TABLET ORAL EVERY 12 HOURS SCHEDULED
Qty: 180 TABLET | Refills: 1 | Status: SHIPPED | OUTPATIENT
Start: 2022-02-07

## 2022-02-07 NOTE — PROGRESS NOTES
[facility] [address] [place] [address]    Assessment/Plan:  Brittani Schneider is a 27 y o  Lisa Machelle who presents for post-op visit s/p robotic assisted total laparoscopic hysterectomy and bilateral salpingectomy, doing well  S/P hysterectomy  - Laparoscopy incisions and vaginal cuff are clean, dry, intact and well healing   - Reviewed further precautions and return to work guidelines  Diagnoses and all orders for this visit:    BMI 50 0-59 9, adult ([address] )  -     Ambulatory Referral to Weight Management; Future    S/P hysterectomy          Subjective:   Brittani Schneider is a 27 y o  Lisa Machelle female  CC: postop    HPI: Andreia presents for postop  She has been ambulating, voiding, tolerating a regular diet, passing gas, and having bowel movements  She has no vaginal bleeding  Incisions are healing well  ROS: Review of Systems   Constitutional: Negative for chills and fever  Respiratory: Negative for cough and shortness of breath  Cardiovascular: Negative for chest pain and leg swelling  Gastrointestinal: Negative for abdominal pain, nausea and vomiting  Genitourinary: Negative for dysuria, frequency and urgency  Neurological: Negative for dizziness, light-headedness and headaches  PFSH: The following portions of the patient's history were reviewed and updated as appropriate: allergies, current medications, past family history, past medical history, obstetric history, gynecologic history, past social history, past surgical history and problem list        Objective:  /70   Ht 5' 4" (1 626 m)   Wt (!) 151 kg (332 lb)   BMI 56 99 kg/m²    Physical Exam  Constitutional:       Appearance: Normal appearance  HENT:      Head: Normocephalic and atraumatic  Mouth/Throat:      Mouth: Mucous membranes are moist       Pharynx: Oropharynx is clear  No oropharyngeal exudate  Cardiovascular:      Rate and Rhythm: Normal rate     Pulmonary:      Effort: Pulmonary effort is normal    Abdominal:      General: Abdomen is flat  There is no distension  Palpations: Abdomen is soft  There is no mass  Tenderness: There is no abdominal tenderness  Hernia: No hernia is present  Comments: Robotic 8 mm laparoscopy incisions are clean, dry, intact, and well healing  There is no erythema, induration, fluctuance, or drainage  Genitourinary:     Comments: Vaginal cuff is intact and well healing  Skin:     General: Skin is warm and dry  Neurological:      General: No focal deficit present  Mental Status: She is alert and oriented to person, place, and time     Psychiatric:         Mood and Affect: Mood normal          Behavior: Behavior normal            Paty Dobson MD  [address] [place] [address]  2/7/2022 3:40 PM

## 2022-02-07 NOTE — ASSESSMENT & PLAN NOTE
- Laparoscopy incisions and vaginal cuff are clean, dry, intact and well healing   - Reviewed further precautions and return to work guidelines

## 2022-02-11 ENCOUNTER — TELEPHONE (OUTPATIENT)
Dept: OBGYN CLINIC | Facility: MEDICAL CENTER | Age: 32
End: 2022-02-11

## 2022-02-17 ENCOUNTER — TELEPHONE (OUTPATIENT)
Dept: OBGYN CLINIC | Facility: MEDICAL CENTER | Age: 32
End: 2022-02-17

## 2022-02-17 NOTE — TELEPHONE ENCOUNTER
Called pt to let her know that Return to work Certification forms have been faxed to employer and scanned into chart  Fax confirmation received and physical copy sent by mail to pt 2/17/22

## 2022-03-14 ENCOUNTER — OFFICE VISIT (OUTPATIENT)
Dept: INTERNAL MEDICINE CLINIC | Facility: CLINIC | Age: 32
End: 2022-03-14
Payer: COMMERCIAL

## 2022-03-14 VITALS
HEART RATE: 75 BPM | SYSTOLIC BLOOD PRESSURE: 118 MMHG | RESPIRATION RATE: 16 BRPM | TEMPERATURE: 98.2 F | DIASTOLIC BLOOD PRESSURE: 72 MMHG

## 2022-03-14 DIAGNOSIS — E78.2 COMBINED HYPERLIPIDEMIA: ICD-10-CM

## 2022-03-14 DIAGNOSIS — I10 PRIMARY HYPERTENSION: Primary | ICD-10-CM

## 2022-03-14 DIAGNOSIS — E55.9 VITAMIN D DEFICIENCY: ICD-10-CM

## 2022-03-14 DIAGNOSIS — T14.91XA SUICIDE ATTEMPT (HCC): ICD-10-CM

## 2022-03-14 DIAGNOSIS — Z13.1 SCREENING FOR DIABETES MELLITUS: ICD-10-CM

## 2022-03-14 DIAGNOSIS — Z13.29 SCREENING FOR THYROID DISORDER: ICD-10-CM

## 2022-03-14 DIAGNOSIS — J45.990 EXERCISE-INDUCED ASTHMA: ICD-10-CM

## 2022-03-14 PROCEDURE — 3725F SCREEN DEPRESSION PERFORMED: CPT | Performed by: PHYSICIAN ASSISTANT

## 2022-03-14 PROCEDURE — 99214 OFFICE O/P EST MOD 30 MIN: CPT | Performed by: PHYSICIAN ASSISTANT

## 2022-03-14 PROCEDURE — 1036F TOBACCO NON-USER: CPT | Performed by: PHYSICIAN ASSISTANT

## 2022-03-14 NOTE — PROGRESS NOTES
Assessment/Plan:  Problem List Items Addressed This Visit        Respiratory    Exercise-induced asthma       Cardiovascular and Mediastinum    Hypertension - Primary     Pts symptoms are stable with current regime  No changes at present  Relevant Orders    CBC and differential    Comprehensive metabolic panel       Other    Combined hyperlipidemia    Relevant Orders    Lipid panel    Suicide attempt ([facility] )      Other Visit Diagnoses     Vitamin D deficiency        Relevant Orders    Vitamin D 25 hydroxy    Screening for thyroid disorder        Relevant Orders    TSH, 3rd generation    Screening for diabetes mellitus        Relevant Orders    Hemoglobin A1C           Diagnoses and all orders for this visit:    Primary hypertension  -     CBC and differential; Future  -     Comprehensive metabolic panel; Future    Combined hyperlipidemia  -     Lipid panel; Future    Vitamin D deficiency  -     Vitamin D 25 hydroxy; Future    Screening for thyroid disorder  -     TSH, 3rd generation; Future    Screening for diabetes mellitus  -     Hemoglobin A1C; Future    Suicide attempt ([facility] )    Exercise-induced asthma        Hypertension  Pts symptoms are stable with current regime  No changes at present  Subjective:      Patient ID: Brian Slater is a 27 y o  female  Pt presents for routine visit  She is doing well overall  She had a hysterectomy and many of her gyn issues have resolved  She is due for labs  She has been considering bariatric surgery and is in the process of scheduling a consult  BP is well controlled  The following portions of the patient's history were reviewed and updated as appropriate:   She has a past medical history of Anemia, Asthma, Depression, Endometriosis, Fractures (2018), Hyperlipidemia, Irregular menses, Migraines, Obesity, PCOS (polycystic ovarian syndrome), and Vitamin D deficiency  ,  does not have any pertinent problems on file  ,   has a past surgical history that includes ORIF tibia & fibula fractures (Right); [place] tooth extraction; Knee arthroscopy; and pr laparoscopy w tot hysterectuterus <=250 gram  w tube/ovary (N/A, 1/18/2022)  ,  family history includes Breast cancer in her paternal grandmother; Hyperlipidemia in her mother; No Known Problems in her father  ,   reports that she has never smoked  She has never used smokeless tobacco  She reports current alcohol use  She reports current drug use  Frequency: 3 00 times per week  Drug: Marijuana  ,  is allergic to penicillins and sulfamethoxazole-trimethoprim     Current Outpatient Medications   Medication Sig Dispense Refill    amitriptyline (Elavil) 25 mg tablet Take 1 tablet (25 mg total) by mouth daily at bedtime 30 tablet 2    atorvastatin (LIPITOR) 20 mg tablet TAKE 1 TABLET DAILY  90 tablet 1    metFORMIN (GLUCOPHAGE) 500 mg tablet Take 1 tablet (500 mg total) by mouth 2 (two) times a day with meals 180 tablet 1    propranolol (INDERAL) 20 mg tablet Take 1 tablet (20 mg total) by mouth every 12 (twelve) hours 180 tablet 1     No current facility-administered medications for this visit  Review of Systems   Constitutional: Negative for chills and fever  HENT: Negative for congestion, ear pain, hearing loss, postnasal drip, rhinorrhea, sinus pressure, sinus pain, sore throat and trouble swallowing  Eyes: Negative for pain and visual disturbance  Respiratory: Negative for cough, chest tightness, shortness of breath and wheezing  Cardiovascular: Negative  Negative for chest pain, palpitations and leg swelling  Gastrointestinal: Negative for abdominal pain, blood in stool, constipation, diarrhea, nausea and vomiting  Endocrine: Negative for cold intolerance, heat intolerance, polydipsia, polyphagia and polyuria  Genitourinary: Negative for difficulty urinating, dysuria, flank pain and urgency  Musculoskeletal: Negative for arthralgias, back pain, gait problem and myalgias     Skin: Negative for rash    Allergic/Immunologic: Negative  Neurological: Negative for dizziness, weakness, light-headedness and headaches  Hematological: Negative  Psychiatric/Behavioral: Negative for behavioral problems, dysphoric mood and sleep disturbance  The patient is not nervous/anxious  PHQ-2/9 Depression Screening    Little interest or pleasure in doing things: 1 - several days  Feeling down, depressed, or hopeless: 1 - several days  PHQ-2 Score: 2  PHQ-2 Interpretation: Negative depression screen          Objective:  Vitals:    03/14/22 0754   BP: 118/72   Pulse: 75   Resp: 16   Temp: 98 2 °F (36 8 °C)     There is no height or weight on file to calculate BMI  Physical Exam  Constitutional:       General: She is not in acute distress  Appearance: She is well-developed  She is obese  She is not diaphoretic  HENT:      Head: Normocephalic and atraumatic  Right Ear: External ear normal       Left Ear: External ear normal       Nose: Nose normal       Mouth/Throat:      Pharynx: No oropharyngeal exudate  Eyes:      General: No scleral icterus  Right eye: No discharge  Left eye: No discharge  Conjunctiva/sclera: Conjunctivae normal       Pupils: Pupils are equal, round, and reactive to light  Neck:      Thyroid: No thyromegaly  Cardiovascular:      Rate and Rhythm: Normal rate and regular rhythm  Heart sounds: Normal heart sounds  No murmur heard  No friction rub  No gallop  Pulmonary:      Effort: Pulmonary effort is normal  No respiratory distress  Breath sounds: Normal breath sounds  No wheezing or rales  Abdominal:      General: Bowel sounds are normal  There is no distension  Palpations: Abdomen is soft  Tenderness: There is no abdominal tenderness  Musculoskeletal:         General: No tenderness or deformity  Normal range of motion  Cervical back: Normal range of motion and neck supple  Skin:     General: Skin is warm and dry  Neurological:      Mental Status: She is alert and oriented to person, place, and time  Cranial Nerves: No cranial nerve deficit  Psychiatric:         Behavior: Behavior normal          Thought Content: Thought content normal          Judgment: Judgment normal        BMI Counseling: There is no height or weight on file to calculate BMI  The BMI is above normal  Nutrition recommendations include decreasing portion sizes, consuming healthier snacks and limiting drinks that contain sugar  Rationale for BMI follow-up plan is due to patient being overweight or obese  Depression Screening and Follow-up Plan: Patient was screened for depression during today's encounter  They screened negative with a PHQ-2 score of 2

## 2022-04-01 ENCOUNTER — APPOINTMENT (OUTPATIENT)
Dept: LAB | Facility: CLINIC | Age: 32
End: 2022-04-01
Payer: COMMERCIAL

## 2022-04-01 DIAGNOSIS — Z13.29 SCREENING FOR THYROID DISORDER: ICD-10-CM

## 2022-04-01 DIAGNOSIS — Z13.1 SCREENING FOR DIABETES MELLITUS: ICD-10-CM

## 2022-04-01 DIAGNOSIS — E78.2 COMBINED HYPERLIPIDEMIA: ICD-10-CM

## 2022-04-01 DIAGNOSIS — I10 PRIMARY HYPERTENSION: ICD-10-CM

## 2022-04-01 DIAGNOSIS — E55.9 VITAMIN D DEFICIENCY: ICD-10-CM

## 2022-04-01 LAB
25(OH)D3 SERPL-MCNC: 39.7 NG/ML (ref 30–100)
ALBUMIN SERPL BCP-MCNC: 3.7 G/DL (ref 3.5–5)
ALP SERPL-CCNC: 98 U/L (ref 46–116)
ALT SERPL W P-5'-P-CCNC: 45 U/L (ref 12–78)
ANION GAP SERPL CALCULATED.3IONS-SCNC: 8 MMOL/L (ref 4–13)
AST SERPL W P-5'-P-CCNC: 24 U/L (ref 5–45)
BASOPHILS # BLD AUTO: 0.06 THOUSANDS/ΜL (ref 0–0.1)
BASOPHILS NFR BLD AUTO: 1 % (ref 0–1)
BILIRUB SERPL-MCNC: 0.36 MG/DL (ref 0.2–1)
BUN SERPL-MCNC: 17 MG/DL (ref 5–25)
CALCIUM SERPL-MCNC: 9.8 MG/DL (ref 8.3–10.1)
CHLORIDE SERPL-SCNC: 106 MMOL/L (ref 100–108)
CHOLEST SERPL-MCNC: 179 MG/DL
CO2 SERPL-SCNC: 24 MMOL/L (ref 21–32)
CREAT SERPL-MCNC: 0.79 MG/DL (ref 0.6–1.3)
EOSINOPHIL # BLD AUTO: 0.11 THOUSAND/ΜL (ref 0–0.61)
EOSINOPHIL NFR BLD AUTO: 1 % (ref 0–6)
ERYTHROCYTE [DISTWIDTH] IN BLOOD BY AUTOMATED COUNT: 16 % (ref 11.6–15.1)
EST. AVERAGE GLUCOSE BLD GHB EST-MCNC: 123 MG/DL
GFR SERPL CREATININE-BSD FRML MDRD: 98 ML/MIN/1.73SQ M
GLUCOSE P FAST SERPL-MCNC: 83 MG/DL (ref 65–99)
HBA1C MFR BLD: 5.9 %
HCT VFR BLD AUTO: 42.2 % (ref 34.8–46.1)
HDLC SERPL-MCNC: 48 MG/DL
HGB BLD-MCNC: 13.9 G/DL (ref 11.5–15.4)
IMM GRANULOCYTES # BLD AUTO: 0.04 THOUSAND/UL (ref 0–0.2)
IMM GRANULOCYTES NFR BLD AUTO: 0 % (ref 0–2)
LDLC SERPL CALC-MCNC: 78 MG/DL (ref 0–100)
LYMPHOCYTES # BLD AUTO: 2.45 THOUSANDS/ΜL (ref 0.6–4.47)
LYMPHOCYTES NFR BLD AUTO: 26 % (ref 14–44)
MCH RBC QN AUTO: 28.1 PG (ref 26.8–34.3)
MCHC RBC AUTO-ENTMCNC: 32.9 G/DL (ref 31.4–37.4)
MCV RBC AUTO: 85 FL (ref 82–98)
MONOCYTES # BLD AUTO: 0.58 THOUSAND/ΜL (ref 0.17–1.22)
MONOCYTES NFR BLD AUTO: 6 % (ref 4–12)
NEUTROPHILS # BLD AUTO: 6.24 THOUSANDS/ΜL (ref 1.85–7.62)
NEUTS SEG NFR BLD AUTO: 66 % (ref 43–75)
NONHDLC SERPL-MCNC: 131 MG/DL
NRBC BLD AUTO-RTO: 0 /100 WBCS
PLATELET # BLD AUTO: 331 THOUSANDS/UL (ref 149–390)
PMV BLD AUTO: 11.2 FL (ref 8.9–12.7)
POTASSIUM SERPL-SCNC: 4.1 MMOL/L (ref 3.5–5.3)
PROT SERPL-MCNC: 7.8 G/DL (ref 6.4–8.2)
RBC # BLD AUTO: 4.95 MILLION/UL (ref 3.81–5.12)
SODIUM SERPL-SCNC: 138 MMOL/L (ref 136–145)
TRIGL SERPL-MCNC: 264 MG/DL
TSH SERPL DL<=0.05 MIU/L-ACNC: 1.52 UIU/ML (ref 0.36–3.74)
WBC # BLD AUTO: 9.48 THOUSAND/UL (ref 4.31–10.16)

## 2022-04-01 PROCEDURE — 80053 COMPREHEN METABOLIC PANEL: CPT

## 2022-04-01 PROCEDURE — 36415 COLL VENOUS BLD VENIPUNCTURE: CPT

## 2022-04-01 PROCEDURE — 83036 HEMOGLOBIN GLYCOSYLATED A1C: CPT

## 2022-04-01 PROCEDURE — 82306 VITAMIN D 25 HYDROXY: CPT

## 2022-04-01 PROCEDURE — 84443 ASSAY THYROID STIM HORMONE: CPT

## 2022-04-01 PROCEDURE — 85025 COMPLETE CBC W/AUTO DIFF WBC: CPT

## 2022-04-01 PROCEDURE — 80061 LIPID PANEL: CPT

## 2022-04-19 ENCOUNTER — OFFICE VISIT (OUTPATIENT)
Dept: INTERNAL MEDICINE CLINIC | Facility: CLINIC | Age: 32
End: 2022-04-19
Payer: COMMERCIAL

## 2022-04-19 VITALS
OXYGEN SATURATION: 98 % | DIASTOLIC BLOOD PRESSURE: 66 MMHG | HEIGHT: 64 IN | BODY MASS INDEX: 56.99 KG/M2 | SYSTOLIC BLOOD PRESSURE: 126 MMHG | HEART RATE: 92 BPM | TEMPERATURE: 98.1 F

## 2022-04-19 DIAGNOSIS — R22.31 LUMP OF AXILLA, RIGHT: Primary | ICD-10-CM

## 2022-04-19 PROCEDURE — 99214 OFFICE O/P EST MOD 30 MIN: CPT | Performed by: PHYSICIAN ASSISTANT

## 2022-04-19 PROCEDURE — 1036F TOBACCO NON-USER: CPT | Performed by: PHYSICIAN ASSISTANT

## 2022-04-19 NOTE — PROGRESS NOTES
Assessment/Plan:  Problem List Items Addressed This Visit        Other    Lump of axilla, right - Primary     Will get ultrasound to better evaluate  Treat according to results  Relevant Orders    US axilla           Diagnoses and all orders for this visit:    Lump of axilla, right  -     US axilla; Future        Lump of axilla, right  Will get ultrasound to better evaluate  Treat according to results  Subjective:      Patient ID: Stephania Arredondo is a 34 y o  female  Pt presents for evaluation of right axillary lump  She notes it has been present since at least February and she noticed it while showering  It is non tender  No redness or discharge  It is not visible outwardly but palpable  The following portions of the patient's history were reviewed and updated as appropriate:   She has a past medical history of Anemia, Asthma, Depression, Endometriosis, Fractures (2018), Hyperlipidemia, Irregular menses, Migraines, Obesity, PCOS (polycystic ovarian syndrome), and Vitamin D deficiency  ,  does not have any pertinent problems on file  ,   has a past surgical history that includes ORIF tibia & fibula fractures (Right); [place] tooth extraction; Knee arthroscopy; and pr laparoscopy w tot hysterectuterus <=250 gram  w tube/ovary (N/A, 1/18/2022)  ,  family history includes Breast cancer in her paternal grandmother; Hyperlipidemia in her mother; No Known Problems in her father  ,   reports that she has never smoked  She has never used smokeless tobacco  She reports current alcohol use  She reports current drug use  Frequency: 3 00 times per week  Drug: Marijuana  ,  is allergic to penicillins and sulfamethoxazole-trimethoprim     Current Outpatient Medications   Medication Sig Dispense Refill    amitriptyline (Elavil) 25 mg tablet Take 1 tablet (25 mg total) by mouth daily at bedtime 30 tablet 2    atorvastatin (LIPITOR) 20 mg tablet TAKE 1 TABLET DAILY   90 tablet 1    metFORMIN (GLUCOPHAGE) 500 mg tablet Take 1 tablet (500 mg total) by mouth 2 (two) times a day with meals 180 tablet 1    propranolol (INDERAL) 20 mg tablet Take 1 tablet (20 mg total) by mouth every 12 (twelve) hours 180 tablet 1     No current facility-administered medications for this visit  Review of Systems   Constitutional: Negative for chills and fever  HENT: Negative for congestion, ear pain, hearing loss, postnasal drip, rhinorrhea, sinus pressure, sinus pain, sore throat and trouble swallowing  Eyes: Negative for pain and visual disturbance  Respiratory: Negative for cough, chest tightness, shortness of breath and wheezing  Cardiovascular: Negative  Negative for chest pain, palpitations and leg swelling  Gastrointestinal: Negative for abdominal pain, blood in stool, constipation, diarrhea, nausea and vomiting  Endocrine: Negative for cold intolerance, heat intolerance, polydipsia, polyphagia and polyuria  Genitourinary: Negative for difficulty urinating, dysuria, flank pain and urgency  Musculoskeletal: Negative for arthralgias, back pain, gait problem and myalgias  Skin: Negative for rash  Allergic/Immunologic: Negative  Neurological: Negative for dizziness, weakness, light-headedness and headaches  Hematological: Negative  Psychiatric/Behavioral: Negative for behavioral problems, dysphoric mood and sleep disturbance  The patient is not nervous/anxious  PHQ-2/9 Depression Screening            Objective:  Vitals:    04/19/22 0832   BP: 126/66   BP Location: Left arm   Patient Position: Sitting   Pulse: 92   Temp: 98 1 °F (36 7 °C)   SpO2: 98%   Height: 5' 4" (1 626 m)     Body mass index is 56 99 kg/m²  Physical Exam  Constitutional:       General: She is not in acute distress  Appearance: She is well-developed  She is obese  She is not diaphoretic  HENT:      Head: Normocephalic and atraumatic        Right Ear: External ear normal       Left Ear: External ear normal       Nose: Nose normal       Mouth/Throat:      Pharynx: No oropharyngeal exudate  Eyes:      General: No scleral icterus  Right eye: No discharge  Left eye: No discharge  Conjunctiva/sclera: Conjunctivae normal       Pupils: Pupils are equal, round, and reactive to light  Neck:      Thyroid: No thyromegaly  Cardiovascular:      Rate and Rhythm: Normal rate and regular rhythm  Heart sounds: Normal heart sounds  No murmur heard  No friction rub  No gallop  Pulmonary:      Effort: Pulmonary effort is normal  No respiratory distress  Breath sounds: Normal breath sounds  No wheezing or rales  Abdominal:      General: Bowel sounds are normal  There is no distension  Palpations: Abdomen is soft  Tenderness: There is no abdominal tenderness  Musculoskeletal:         General: No tenderness or deformity  Normal range of motion  Cervical back: Normal range of motion and neck supple  Skin:     General: Skin is warm and dry  Neurological:      Mental Status: She is alert and oriented to person, place, and time  Cranial Nerves: No cranial nerve deficit  Psychiatric:         Behavior: Behavior normal          Thought Content:  Thought content normal          Judgment: Judgment normal

## 2022-05-05 ENCOUNTER — HOSPITAL ENCOUNTER (OUTPATIENT)
Dept: MAMMOGRAPHY | Facility: HOSPITAL | Age: 32
Discharge: HOME | End: 2022-05-05
Payer: COMMERCIAL

## 2022-05-05 ENCOUNTER — HOSPITAL ENCOUNTER (OUTPATIENT)
Dept: ULTRASOUND IMAGING | Facility: HOSPITAL | Age: 32
Discharge: HOME | End: 2022-05-05
Payer: COMMERCIAL

## 2022-05-05 DIAGNOSIS — R22.31 AXILLARY LUMP, RIGHT: ICD-10-CM

## 2022-05-05 DIAGNOSIS — R22.31 LUMP IN ARMPIT, RIGHT: ICD-10-CM

## 2022-05-05 PROCEDURE — 76642 ULTRASOUND BREAST LIMITED: CPT

## 2022-05-10 DIAGNOSIS — G43.809 OTHER MIGRAINE WITHOUT STATUS MIGRAINOSUS, NOT INTRACTABLE: ICD-10-CM

## 2022-05-10 RX ORDER — AMITRIPTYLINE HYDROCHLORIDE 25 MG/1
25 TABLET, FILM COATED ORAL
Qty: 90 TABLET | Refills: 1 | Status: SHIPPED | OUTPATIENT
Start: 2022-05-10

## 2022-07-28 ENCOUNTER — OFFICE VISIT (OUTPATIENT)
Dept: INTERNAL MEDICINE CLINIC | Facility: CLINIC | Age: 32
End: 2022-07-28
Payer: COMMERCIAL

## 2022-07-28 VITALS
DIASTOLIC BLOOD PRESSURE: 68 MMHG | TEMPERATURE: 97.6 F | SYSTOLIC BLOOD PRESSURE: 132 MMHG | BODY MASS INDEX: 50.02 KG/M2 | HEART RATE: 80 BPM | WEIGHT: 293 LBS | HEIGHT: 64 IN | OXYGEN SATURATION: 98 %

## 2022-07-28 DIAGNOSIS — B35.4 TINEA CORPORIS: Primary | ICD-10-CM

## 2022-07-28 DIAGNOSIS — I10 PRIMARY HYPERTENSION: ICD-10-CM

## 2022-07-28 DIAGNOSIS — R73.01 ELEVATED FASTING GLUCOSE: ICD-10-CM

## 2022-07-28 DIAGNOSIS — G43.809 OTHER MIGRAINE WITHOUT STATUS MIGRAINOSUS, NOT INTRACTABLE: ICD-10-CM

## 2022-07-28 LAB — SL AMB POCT HEMOGLOBIN AIC: 5.5 (ref ?–6.5)

## 2022-07-28 PROCEDURE — 83036 HEMOGLOBIN GLYCOSYLATED A1C: CPT | Performed by: PHYSICIAN ASSISTANT

## 2022-07-28 PROCEDURE — 99214 OFFICE O/P EST MOD 30 MIN: CPT | Performed by: PHYSICIAN ASSISTANT

## 2022-07-28 RX ORDER — TOPIRAMATE 25 MG/1
25 CAPSULE, COATED PELLETS ORAL 2 TIMES DAILY
Qty: 60 CAPSULE | Refills: 2 | Status: SHIPPED | OUTPATIENT
Start: 2022-07-28

## 2022-07-28 RX ORDER — KETOCONAZOLE 20 MG/G
CREAM TOPICAL DAILY
Qty: 15 G | Refills: 0 | Status: SHIPPED | OUTPATIENT
Start: 2022-07-28

## 2022-07-28 RX ORDER — FERROUS SULFATE 325(65) MG
325 TABLET ORAL
COMMUNITY

## 2022-07-28 RX ORDER — ACETAMINOPHEN, ASPIRIN AND CAFFEINE 250; 250; 65 MG/1; MG/1; MG/1
1 TABLET, FILM COATED ORAL EVERY 6 HOURS PRN
COMMUNITY

## 2022-07-28 NOTE — ASSESSMENT & PLAN NOTE
Start topamax  Directions for use and possible side effects discussed and patient verbalized understanding of these

## 2022-07-28 NOTE — PROGRESS NOTES
Assessment/Plan:  Problem List Items Addressed This Visit        Cardiovascular and Mediastinum    Hypertension     Directions for use and possible side effects discussed and patient verbalized understanding of these  Migraine     Start topamax  Directions for use and possible side effects discussed and patient verbalized understanding of these  Relevant Medications    aspirin-acetaminophen-caffeine (EXCEDRIN MIGRAINE) 250-250-65 MG per tablet    topiramate (TOPAMAX) 25 mg sprinkle capsule       Musculoskeletal and Integument    Tinea corporis - Primary     Ketoconazole ordered  Relevant Medications    ketoconazole (NIZORAL) 2 % cream      Other Visit Diagnoses     Elevated fasting glucose        Relevant Orders    POCT hemoglobin A1c (Completed)           Diagnoses and all orders for this visit:    Tinea corporis  -     ketoconazole (NIZORAL) 2 % cream; Apply topically daily    Other migraine without status migrainosus, not intractable  -     topiramate (TOPAMAX) 25 mg sprinkle capsule; Take 1 capsule (25 mg total) by mouth 2 (two) times a day    Elevated fasting glucose  -     POCT hemoglobin A1c    Primary hypertension    Other orders  -     ferrous sulfate 325 (65 Fe) mg tablet; Take 325 mg by mouth daily with breakfast  -     aspirin-acetaminophen-caffeine (EXCEDRIN MIGRAINE) 250-250-65 MG per tablet; Take 1 tablet by mouth every 6 (six) hours as needed for headaches      Tinea corporis  Ketoconazole ordered  Hypertension  Directions for use and possible side effects discussed and patient verbalized understanding of these  Migraine  Start topamax  Directions for use and possible side effects discussed and patient verbalized understanding of these  Subjective:      Patient ID: Marta Kinsey is a 34 y o  female  Pt presents for routine visit  She is doing fairly well  She is up to date with labs  She has been doing well with metformin for her insulin resistance  She has lost some weight but would like to lose more  She notes ongoing daily headaches and has been taking too much excedrin  She also takes elavil and propranolol  She also notes a rash on her legs that is lacy, circular, red with central clearing and itchy  The following portions of the patient's history were reviewed and updated as appropriate:   She has a past medical history of Anemia, Asthma, Depression, Endometriosis, Fractures (2018), Hyperlipidemia, Irregular menses, Migraines, Obesity, PCOS (polycystic ovarian syndrome), and Vitamin D deficiency  ,  does not have any pertinent problems on file  ,   has a past surgical history that includes ORIF tibia & fibula fractures (Right); [place] tooth extraction; Knee arthroscopy; pr laparoscopy w tot hysterectuterus <=250 gram  w tube/ovary (N/A, 1/18/2022); and Hysterectomy  ,  family history includes Breast cancer in her paternal grandmother; Hyperlipidemia in her mother; No Known Problems in her father, maternal aunt, maternal grandfather, maternal grandmother, paternal aunt, paternal aunt, paternal grandfather, and sister  ,   reports that she has never smoked  She has never used smokeless tobacco  She reports current alcohol use  She reports current drug use  Frequency: 3 00 times per week  Drug: Marijuana  ,  is allergic to penicillins and sulfamethoxazole-trimethoprim     Current Outpatient Medications   Medication Sig Dispense Refill    amitriptyline (Elavil) 25 mg tablet Take 1 tablet (25 mg total) by mouth daily at bedtime 90 tablet 1    aspirin-acetaminophen-caffeine (EXCEDRIN MIGRAINE) 250-250-65 MG per tablet Take 1 tablet by mouth every 6 (six) hours as needed for headaches      atorvastatin (LIPITOR) 20 mg tablet TAKE 1 TABLET DAILY   90 tablet 1    ferrous sulfate 325 (65 Fe) mg tablet Take 325 mg by mouth daily with breakfast      ketoconazole (NIZORAL) 2 % cream Apply topically daily 15 g 0    metFORMIN (GLUCOPHAGE) 500 mg tablet Take 1 tablet (500 mg total) by mouth 2 (two) times a day with meals 180 tablet 1    propranolol (INDERAL) 20 mg tablet Take 1 tablet (20 mg total) by mouth every 12 (twelve) hours 180 tablet 1    topiramate (TOPAMAX) 25 mg sprinkle capsule Take 1 capsule (25 mg total) by mouth 2 (two) times a day 60 capsule 2     No current facility-administered medications for this visit  Review of Systems   Constitutional: Negative for chills and fever  HENT: Negative for congestion, ear pain, hearing loss, postnasal drip, rhinorrhea, sinus pressure, sinus pain, sore throat and trouble swallowing  Eyes: Negative for pain and visual disturbance  Respiratory: Negative for cough, chest tightness, shortness of breath and wheezing  Cardiovascular: Negative  Negative for chest pain, palpitations and leg swelling  Gastrointestinal: Negative for abdominal pain, blood in stool, constipation, diarrhea, nausea and vomiting  Endocrine: Negative for cold intolerance, heat intolerance, polydipsia, polyphagia and polyuria  Genitourinary: Negative for difficulty urinating, dysuria, flank pain and urgency  Musculoskeletal: Negative for arthralgias, back pain, gait problem and myalgias  Skin: Positive for rash  Allergic/Immunologic: Negative  Neurological: Positive for headaches  Negative for dizziness, weakness and light-headedness  Hematological: Negative  Psychiatric/Behavioral: Negative for behavioral problems, dysphoric mood and sleep disturbance  The patient is not nervous/anxious  PHQ-2/9 Depression Screening            Objective:  Vitals:    07/28/22 0915   BP: 132/68   BP Location: Left arm   Patient Position: Sitting   Pulse: 80   Temp: 97 6 °F (36 4 °C)   SpO2: 98%   Weight: (!) 141 kg (310 lb 2 oz)   Height: 5' 4" (1 626 m)     Body mass index is 53 23 kg/m²  Physical Exam  Constitutional:       General: She is not in acute distress  Appearance: She is well-developed  She is not diaphoretic  HENT:      Head: Normocephalic and atraumatic  Right Ear: External ear normal       Left Ear: External ear normal       Nose: Nose normal       Mouth/Throat:      Pharynx: No oropharyngeal exudate  Eyes:      General: No scleral icterus  Right eye: No discharge  Left eye: No discharge  Conjunctiva/sclera: Conjunctivae normal       Pupils: Pupils are equal, round, and reactive to light  Neck:      Thyroid: No thyromegaly  Cardiovascular:      Rate and Rhythm: Normal rate and regular rhythm  Heart sounds: Normal heart sounds  No murmur heard  No friction rub  No gallop  Pulmonary:      Effort: Pulmonary effort is normal  No respiratory distress  Breath sounds: Normal breath sounds  No wheezing or rales  Abdominal:      General: Bowel sounds are normal  There is no distension  Palpations: Abdomen is soft  Tenderness: There is no abdominal tenderness  Musculoskeletal:         General: No tenderness or deformity  Normal range of motion  Cervical back: Normal range of motion and neck supple  Skin:     General: Skin is warm and dry  Neurological:      Mental Status: She is alert and oriented to person, place, and time  Cranial Nerves: No cranial nerve deficit  Psychiatric:         Behavior: Behavior normal          Thought Content:  Thought content normal          Judgment: Judgment normal

## 2022-07-28 NOTE — ASSESSMENT & PLAN NOTE
Directions for use and possible side effects discussed and patient verbalized understanding of these

## 2022-12-20 ENCOUNTER — OFFICE VISIT (OUTPATIENT)
Dept: INTERNAL MEDICINE CLINIC | Facility: CLINIC | Age: 32
End: 2022-12-20

## 2022-12-20 VITALS
TEMPERATURE: 97.6 F | BODY MASS INDEX: 50.02 KG/M2 | OXYGEN SATURATION: 98 % | HEART RATE: 90 BPM | SYSTOLIC BLOOD PRESSURE: 128 MMHG | WEIGHT: 293 LBS | DIASTOLIC BLOOD PRESSURE: 66 MMHG | HEIGHT: 64 IN

## 2022-12-20 DIAGNOSIS — F41.1 GAD (GENERALIZED ANXIETY DISORDER): Primary | ICD-10-CM

## 2022-12-20 RX ORDER — BUSPIRONE HYDROCHLORIDE 10 MG/1
10 TABLET ORAL 3 TIMES DAILY
Qty: 90 TABLET | Refills: 0 | Status: SHIPPED | OUTPATIENT
Start: 2022-12-20

## 2022-12-21 PROBLEM — F41.1 GAD (GENERALIZED ANXIETY DISORDER): Status: ACTIVE | Noted: 2022-12-21

## 2022-12-21 PROBLEM — T14.91XA SUICIDE ATTEMPT (HCC): Status: RESOLVED | Noted: 2021-05-15 | Resolved: 2022-12-21

## 2022-12-21 NOTE — PROGRESS NOTES
Name: Salomon Mistry      : 1990      MRN: [id number]  Encounter Provider: Simon Upton PA-C  Encounter Date: 2022   Encounter department: [address] [place] [address]  ADAM (generalized anxiety disorder)  Assessment & Plan:  Start buspar  Directions for use and possible side effects discussed and patient verbalized understanding of these  Orders:  -     busPIRone (BUSPAR) 10 mg tablet; Take 1 tablet (10 mg total) by mouth 3 (three) times a day           Subjective      Pt presents for routine visit  She is tearful today  She notes that before I entered the room she got a call from her therapists office stating they were dismissing her due to a missed appt  She notes she had been seeing him for over 10 years and is hurt by this  She also recently left her job and has no insurance presently which is also stressful for her  Review of Systems   Constitutional: Negative for chills and fever  HENT: Negative for congestion, ear pain, hearing loss, postnasal drip, rhinorrhea, sinus pressure, sinus pain, sore throat and trouble swallowing  Eyes: Negative for pain and visual disturbance  Respiratory: Negative for cough, chest tightness, shortness of breath and wheezing  Cardiovascular: Negative  Negative for chest pain, palpitations and leg swelling  Gastrointestinal: Negative for abdominal pain, blood in stool, constipation, diarrhea, nausea and vomiting  Endocrine: Negative for cold intolerance, heat intolerance, polydipsia, polyphagia and polyuria  Genitourinary: Negative for difficulty urinating, dysuria, flank pain and urgency  Musculoskeletal: Negative for arthralgias, back pain, gait problem and myalgias  Skin: Negative for rash  Allergic/Immunologic: Negative  Neurological: Negative for dizziness, weakness, light-headedness and headaches  Hematological: Negative  Psychiatric/Behavioral: Positive for dysphoric mood   Negative for behavioral problems and sleep disturbance  The patient is nervous/anxious  Current Outpatient Medications on File Prior to Visit   Medication Sig   • aspirin-acetaminophen-caffeine (EXCEDRIN MIGRAINE) 250-250-65 MG per tablet Take 1 tablet by mouth every 6 (six) hours as needed for headaches PT IS TAKING 12-16 TABS DAILY, EVERY DAY  • amitriptyline (Elavil) 25 mg tablet Take 1 tablet (25 mg total) by mouth daily at bedtime (Patient not taking: Reported on 12/20/2022)   • atorvastatin (LIPITOR) 20 mg tablet TAKE 1 TABLET DAILY  (Patient not taking: Reported on 12/20/2022)   • ferrous sulfate 325 (65 Fe) mg tablet Take 325 mg by mouth daily with breakfast (Patient not taking: Reported on 12/20/2022)   • ketoconazole (NIZORAL) 2 % cream Apply topically daily (Patient not taking: Reported on 12/20/2022)   • metFORMIN (GLUCOPHAGE) 500 mg tablet Take 1 tablet (500 mg total) by mouth 2 (two) times a day with meals (Patient not taking: Reported on 12/20/2022)   • propranolol (INDERAL) 20 mg tablet Take 1 tablet (20 mg total) by mouth every 12 (twelve) hours (Patient not taking: Reported on 12/20/2022)   • topiramate (TOPAMAX) 25 mg sprinkle capsule Take 1 capsule (25 mg total) by mouth 2 (two) times a day (Patient not taking: Reported on 12/20/2022)       Objective     /66 (BP Location: Left arm, Patient Position: Sitting)   Pulse 90   Temp 97 6 °F (36 4 °C)   Ht 5' 4" (1 626 m)   Wt (!) 144 kg (317 lb 2 oz)   SpO2 98%   BMI 54 43 kg/m²     Physical Exam  Constitutional:       General: She is not in acute distress  Appearance: She is well-developed  She is not diaphoretic  HENT:      Head: Normocephalic and atraumatic  Right Ear: External ear normal       Left Ear: External ear normal       Nose: Nose normal       Mouth/Throat:      Pharynx: No oropharyngeal exudate  Eyes:      General: No scleral icterus  Right eye: No discharge  Left eye: No discharge  Conjunctiva/sclera: Conjunctivae normal       Pupils: Pupils are equal, round, and reactive to light  Neck:      Thyroid: No thyromegaly  Cardiovascular:      Rate and Rhythm: Normal rate and regular rhythm  Heart sounds: Normal heart sounds  No murmur heard  No friction rub  No gallop  Pulmonary:      Effort: Pulmonary effort is normal  No respiratory distress  Breath sounds: Normal breath sounds  No wheezing or rales  Abdominal:      General: Bowel sounds are normal  There is no distension  Palpations: Abdomen is soft  Tenderness: There is no abdominal tenderness  Musculoskeletal:         General: No tenderness or deformity  Normal range of motion  Cervical back: Normal range of motion and neck supple  Skin:     General: Skin is warm and dry  Neurological:      Mental Status: She is alert and oriented to person, place, and time  Cranial Nerves: No cranial nerve deficit  Psychiatric:         Behavior: Behavior normal          Thought Content:  Thought content normal          Judgment: Judgment normal        Elsi Beckman PA-C

## 2022-12-21 NOTE — ASSESSMENT & PLAN NOTE
Start buspar  Directions for use and possible side effects discussed and patient verbalized understanding of these

## 2023-01-06 DIAGNOSIS — I10 ESSENTIAL HYPERTENSION: ICD-10-CM

## 2023-01-06 DIAGNOSIS — E78.00 PURE HYPERCHOLESTEROLEMIA: ICD-10-CM

## 2023-01-06 DIAGNOSIS — E28.2 PCOS (POLYCYSTIC OVARIAN SYNDROME): ICD-10-CM

## 2023-01-06 RX ORDER — PROPRANOLOL HYDROCHLORIDE 20 MG/1
20 TABLET ORAL EVERY 12 HOURS SCHEDULED
Qty: 180 TABLET | Refills: 0 | Status: SHIPPED | OUTPATIENT
Start: 2023-01-06

## 2023-01-06 RX ORDER — ATORVASTATIN CALCIUM 20 MG/1
20 TABLET, FILM COATED ORAL DAILY
Qty: 90 TABLET | Refills: 0 | Status: SHIPPED | OUTPATIENT
Start: 2023-01-06

## 2023-03-12 DIAGNOSIS — G43.809 OTHER MIGRAINE WITHOUT STATUS MIGRAINOSUS, NOT INTRACTABLE: ICD-10-CM

## 2023-03-13 RX ORDER — TOPIRAMATE 25 MG/1
25 CAPSULE, COATED PELLETS ORAL 2 TIMES DAILY
Qty: 60 CAPSULE | Refills: 2 | Status: SHIPPED | OUTPATIENT
Start: 2023-03-13

## 2023-05-07 DIAGNOSIS — E78.00 PURE HYPERCHOLESTEROLEMIA: ICD-10-CM

## 2023-05-07 DIAGNOSIS — I10 ESSENTIAL HYPERTENSION: ICD-10-CM

## 2023-05-08 RX ORDER — ATORVASTATIN CALCIUM 20 MG/1
20 TABLET, FILM COATED ORAL DAILY
Qty: 90 TABLET | Refills: 1 | Status: SHIPPED | OUTPATIENT
Start: 2023-05-08

## 2023-05-08 RX ORDER — PROPRANOLOL HYDROCHLORIDE 20 MG/1
20 TABLET ORAL EVERY 12 HOURS SCHEDULED
Qty: 180 TABLET | Refills: 1 | Status: SHIPPED | OUTPATIENT
Start: 2023-05-08

## 2023-05-21 DIAGNOSIS — E28.2 PCOS (POLYCYSTIC OVARIAN SYNDROME): ICD-10-CM

## 2023-09-21 NOTE — PRE-PROCEDURE INSTRUCTIONS
Pre-Surgery Instructions:   Medication Instructions    atorvastatin (LIPITOR) 20 mg tablet Instructed to take per normal schedule including DOS with sips water    metFORMIN (GLUCOPHAGE) 500 mg tablet Instructed to take per normal schedule except DOS    norethindrone (AYGESTIN) 5 mg tablet Instructed to take as needed including DOS with sips water    propranolol (INDERAL) 20 mg tablet Instructed to take per normal schedule including DOS with sips water    Pre op instructions per My Surgical Experience booklet,medications per anesthesia guidelines and showering instructions per [facility] protocol reviewed-Patient has CHG  Pt  Verbalized understanding of current visitor restrictions  Instructed to avoid all ASA/NSAIDs and OTC Vit/Supp from now until after surgery per anesthesia guidelines  Tylenol ok prn  Pt  Verbalized an understanding of all instructions reviewed and offers no concerns at this time 
Patient referred by PMD for few days of right upper quadrant abdominal pain.  Patient denies fevers chills chest pain short of breath cough nausea vomiting diarrhea dysuria hematuria frequency or any other complaints.  PMD Nay

## 2024-02-21 PROBLEM — Z00.00 WELL ADULT EXAM: Status: RESOLVED | Noted: 2018-08-31 | Resolved: 2024-02-21

## 2024-02-21 PROBLEM — Z01.419 WOMEN'S ANNUAL ROUTINE GYNECOLOGICAL EXAMINATION: Status: RESOLVED | Noted: 2018-09-24 | Resolved: 2024-02-21

## 2024-06-26 ENCOUNTER — OFFICE VISIT (OUTPATIENT)
Dept: INTERNAL MEDICINE CLINIC | Facility: CLINIC | Age: 34
End: 2024-06-26

## 2024-06-26 VITALS
BODY MASS INDEX: 50.02 KG/M2 | SYSTOLIC BLOOD PRESSURE: 120 MMHG | HEIGHT: 64 IN | TEMPERATURE: 98.3 F | DIASTOLIC BLOOD PRESSURE: 76 MMHG | HEART RATE: 103 BPM | WEIGHT: 293 LBS | OXYGEN SATURATION: 99 %

## 2024-06-26 DIAGNOSIS — I10 PRIMARY HYPERTENSION: ICD-10-CM

## 2024-06-26 DIAGNOSIS — E78.2 COMBINED HYPERLIPIDEMIA: ICD-10-CM

## 2024-06-26 DIAGNOSIS — Z11.4 SCREENING FOR HIV (HUMAN IMMUNODEFICIENCY VIRUS): ICD-10-CM

## 2024-06-26 DIAGNOSIS — Z13.220 SCREENING, LIPID: ICD-10-CM

## 2024-06-26 DIAGNOSIS — Z13.29 SCREENING FOR THYROID DISORDER: ICD-10-CM

## 2024-06-26 DIAGNOSIS — Z00.00 WELL ADULT EXAM: Primary | ICD-10-CM

## 2024-06-26 DIAGNOSIS — Z13.0 SCREENING FOR DEFICIENCY ANEMIA: ICD-10-CM

## 2024-06-26 DIAGNOSIS — Z23 ENCOUNTER FOR IMMUNIZATION: ICD-10-CM

## 2024-06-26 DIAGNOSIS — Z13.1 SCREENING FOR DIABETES MELLITUS: ICD-10-CM

## 2024-06-26 DIAGNOSIS — E28.2 PCOS (POLYCYSTIC OVARIAN SYNDROME): ICD-10-CM

## 2024-06-26 DIAGNOSIS — E55.9 VITAMIN D DEFICIENCY: ICD-10-CM

## 2024-06-26 DIAGNOSIS — Z11.59 NEED FOR HEPATITIS C SCREENING TEST: ICD-10-CM

## 2024-06-26 DIAGNOSIS — G43.809 OTHER MIGRAINE WITHOUT STATUS MIGRAINOSUS, NOT INTRACTABLE: ICD-10-CM

## 2024-06-26 PROCEDURE — 99395 PREV VISIT EST AGE 18-39: CPT | Performed by: PHYSICIAN ASSISTANT

## 2024-06-26 RX ORDER — SUMATRIPTAN 50 MG/1
50 TABLET, FILM COATED ORAL ONCE AS NEEDED
Qty: 10 TABLET | Refills: 5 | Status: SHIPPED | OUTPATIENT
Start: 2024-06-26 | End: 2024-07-02

## 2024-06-26 RX ORDER — AMITRIPTYLINE HYDROCHLORIDE 25 MG/1
25 TABLET, FILM COATED ORAL
Qty: 90 TABLET | Refills: 1 | Status: SHIPPED | OUTPATIENT
Start: 2024-06-26

## 2024-06-27 NOTE — PROGRESS NOTES
Adult Annual Physical  Name: Andreia Lee      : 1990      MRN: [id number]  Encounter Provider: Elsi Beckman PA-C  Encounter Date: 2024   Encounter department: [facility]    Assessment & Plan   1. Well adult exam  2. Encounter for immunization  3. Screening for HIV (human immunodeficiency virus)  -     HIV 1/2 AG/AB w Reflex [facility] for 2 yr old and above; Future  4. Need for hepatitis C screening test  -     Hepatitis C antibody; Future  5. Combined hyperlipidemia  -     Lipid panel; Future  6. Primary hypertension  -     Comprehensive metabolic panel; Future  7. Screening for deficiency anemia  -     CBC and differential; Future  8. Screening for diabetes mellitus  -     Hemoglobin A1C; Future  9. Screening for thyroid disorder  -     TSH, 3rd generation; Future  10. Vitamin D deficiency  -     Vitamin D 25 hydroxy; Future  11. Screening, lipid  12. Other migraine without status migrainosus, not intractable  -     SUMAtriptan (IMITREX) 50 mg tablet; Take 1 tablet (50 mg total) by mouth once as needed for migraine for up to 1 dose may repeat in 2 hours if necessary  -     amitriptyline (Elavil) 25 mg tablet; Take 1 tablet (25 mg total) by mouth daily at bedtime  13. PCOS (polycystic ovarian syndrome)  -     metFORMIN (GLUCOPHAGE) 500 mg tablet; Take 1 tablet (500 mg total) by mouth 2 (two) times a day with meals    Immunizations and preventive care screenings were discussed with patient today. Appropriate education was printed on patient's after visit summary.    Counseling:  Labs ordered, pt may want to wait as she doesn't have insurance.       Depression Screening and Follow-up Plan: Patient was screened for depression during today's encounter. They screened negative with a PHQ-2 score of 0.        History of Present Illness     Adult Annual Physical:  Patient presents for annual physical.     Diet and Physical Activity:  - Diet/Nutrition: well balanced diet.  - Exercise: no  formal exercise.    Depression Screening:  - PHQ-2 Score: 0    General Health:  - Sleep: sleeps well.  - Hearing: normal hearing bilateral ears.  - Vision: no vision problems.  - Dental: regular dental visits.    /GYN Health:  - Follows with GYN: no.   - History of STDs: no    Advanced Care Planning:  - Has an advanced directive?: no    - Has a durable medical POA?: no    - ACP document given to patient?: no      Review of Systems   Constitutional:  Negative for chills and fever.   HENT:  Negative for congestion, ear pain, hearing loss, postnasal drip, rhinorrhea, sinus pressure, sinus pain, sore throat and trouble swallowing.    Eyes:  Negative for pain and visual disturbance.   Respiratory:  Negative for cough, chest tightness, shortness of breath and wheezing.    Cardiovascular: Negative.  Negative for chest pain, palpitations and leg swelling.   Gastrointestinal:  Negative for abdominal pain, blood in stool, constipation, diarrhea, nausea and vomiting.   Endocrine: Negative for cold intolerance, heat intolerance, polydipsia, polyphagia and polyuria.   Genitourinary:  Negative for difficulty urinating, dysuria, flank pain and urgency.   Musculoskeletal:  Negative for arthralgias, back pain, gait problem and myalgias.   Skin:  Negative for rash.   Allergic/Immunologic: Negative.    Neurological:  Negative for dizziness, weakness, light-headedness and headaches.   Hematological: Negative.    Psychiatric/Behavioral:  Negative for behavioral problems, dysphoric mood and sleep disturbance. The patient is not nervous/anxious.      Current Outpatient Medications on File Prior to Visit   Medication Sig Dispense Refill    [DISCONTINUED] aspirin-acetaminophen-caffeine (EXCEDRIN MIGRAINE) 250-250-65 MG per tablet Take 1 tablet by mouth every 6 (six) hours as needed for headaches PT IS TAKING 12-16 TABS DAILY, EVERY DAY.      [DISCONTINUED] atorvastatin (LIPITOR) 20 mg tablet Take 1 tablet (20 mg total) by mouth daily 90  "tablet 1    [DISCONTINUED] busPIRone (BUSPAR) 10 mg tablet Take 1 tablet (10 mg total) by mouth 3 (three) times a day 90 tablet 0     No current facility-administered medications on file prior to visit.      Social History     Tobacco Use    Smoking status: Never     Passive exposure: Never    Smokeless tobacco: Never   Vaping Use    Vaping status: Former    Substances: THC, CBD   Substance and Sexual Activity    Alcohol use: Yes     Comment: socially    Drug use: Yes     Frequency: 3.0 times per week     Types: Marijuana     Comment: medical Tincture     Sexual activity: Not Currently     Birth control/protection: I.U.D.       Objective     /76   Pulse 103   Temp 98.3 °F (36.8 °C)   Ht 5' 3.5\" (1.613 m)   Wt (!) 139 kg (306 lb 8 oz)   SpO2 99%   BMI 53.44 kg/m²     Physical Exam  Constitutional:       Appearance: She is well-developed.   HENT:      Head: Normocephalic and atraumatic.      Right Ear: External ear normal.      Left Ear: External ear normal.      Nose: Nose normal.   Eyes:      Conjunctiva/sclera: Conjunctivae normal.   Cardiovascular:      Rate and Rhythm: Normal rate and regular rhythm.      Heart sounds: Normal heart sounds.   Pulmonary:      Effort: Pulmonary effort is normal.      Breath sounds: Normal breath sounds.   Abdominal:      General: Bowel sounds are normal.      Palpations: Abdomen is soft.   Musculoskeletal:         General: Normal range of motion.      Cervical back: Normal range of motion and neck supple.   Skin:     General: Skin is warm and dry.   Neurological:      Mental Status: She is alert and oriented to person, place, and time.   Psychiatric:         Behavior: Behavior normal.         Thought Content: Thought content normal.         Judgment: Judgment normal.       Administrative Statements   I have spent a total time of 20 minutes on 06/27/24 In caring for this patient including Documenting in the medical record, Reviewing / ordering tests, medicine, procedures  " , and Obtaining or reviewing history  .

## 2024-07-02 DIAGNOSIS — G43.809 OTHER MIGRAINE WITHOUT STATUS MIGRAINOSUS, NOT INTRACTABLE: Primary | ICD-10-CM

## 2024-07-02 RX ORDER — RIZATRIPTAN BENZOATE 10 MG/1
10 TABLET ORAL ONCE AS NEEDED
Qty: 10 TABLET | Refills: 5 | Status: SHIPPED | OUTPATIENT
Start: 2024-07-02

## 2024-07-12 ENCOUNTER — APPOINTMENT (OUTPATIENT)
Dept: LAB | Facility: CLINIC | Age: 34
End: 2024-07-12

## 2024-07-12 DIAGNOSIS — Z11.59 NEED FOR HEPATITIS C SCREENING TEST: ICD-10-CM

## 2024-07-12 DIAGNOSIS — Z11.4 SCREENING FOR HIV (HUMAN IMMUNODEFICIENCY VIRUS): ICD-10-CM

## 2024-07-12 DIAGNOSIS — I10 PRIMARY HYPERTENSION: ICD-10-CM

## 2024-07-12 DIAGNOSIS — Z13.0 SCREENING FOR DEFICIENCY ANEMIA: ICD-10-CM

## 2024-07-12 DIAGNOSIS — Z13.29 SCREENING FOR THYROID DISORDER: ICD-10-CM

## 2024-07-12 DIAGNOSIS — E78.2 COMBINED HYPERLIPIDEMIA: ICD-10-CM

## 2024-07-12 DIAGNOSIS — Z13.1 SCREENING FOR DIABETES MELLITUS: ICD-10-CM

## 2024-07-12 DIAGNOSIS — E55.9 VITAMIN D DEFICIENCY: ICD-10-CM

## 2024-07-12 LAB
25(OH)D3 SERPL-MCNC: 26.3 NG/ML (ref 30–100)
ALBUMIN SERPL BCG-MCNC: 3.8 G/DL (ref 3.5–5)
ALP SERPL-CCNC: 85 U/L (ref 34–104)
ALT SERPL W P-5'-P-CCNC: 36 U/L (ref 7–52)
ANION GAP SERPL CALCULATED.3IONS-SCNC: 8 MMOL/L (ref 4–13)
AST SERPL W P-5'-P-CCNC: 17 U/L (ref 13–39)
BASOPHILS # BLD AUTO: 0.04 THOUSANDS/ÂΜL (ref 0–0.1)
BASOPHILS NFR BLD AUTO: 0 % (ref 0–1)
BILIRUB SERPL-MCNC: 0.32 MG/DL (ref 0.2–1)
BUN SERPL-MCNC: 12 MG/DL (ref 5–25)
CALCIUM SERPL-MCNC: 9.3 MG/DL (ref 8.4–10.2)
CHLORIDE SERPL-SCNC: 101 MMOL/L (ref 96–108)
CHOLEST SERPL-MCNC: 209 MG/DL
CO2 SERPL-SCNC: 27 MMOL/L (ref 21–32)
CREAT SERPL-MCNC: 0.68 MG/DL (ref 0.6–1.3)
EOSINOPHIL # BLD AUTO: 0.15 THOUSAND/ÂΜL (ref 0–0.61)
EOSINOPHIL NFR BLD AUTO: 1 % (ref 0–6)
ERYTHROCYTE [DISTWIDTH] IN BLOOD BY AUTOMATED COUNT: 14.1 % (ref 11.6–15.1)
EST. AVERAGE GLUCOSE BLD GHB EST-MCNC: 111 MG/DL
GFR SERPL CREATININE-BSD FRML MDRD: 113 ML/MIN/1.73SQ M
GLUCOSE P FAST SERPL-MCNC: 78 MG/DL (ref 65–99)
HBA1C MFR BLD: 5.5 %
HCT VFR BLD AUTO: 42.7 % (ref 34.8–46.1)
HDLC SERPL-MCNC: 54 MG/DL
HGB BLD-MCNC: 13.5 G/DL (ref 11.5–15.4)
HIV 1+2 AB+HIV1 P24 AG SERPL QL IA: NORMAL
HIV 2 AB SERPL QL IA: NORMAL
HIV1 AB SERPL QL IA: NORMAL
HIV1 P24 AG SERPL QL IA: NORMAL
IMM GRANULOCYTES # BLD AUTO: 0.07 THOUSAND/UL (ref 0–0.2)
IMM GRANULOCYTES NFR BLD AUTO: 1 % (ref 0–2)
LDLC SERPL CALC-MCNC: 114 MG/DL (ref 0–100)
LYMPHOCYTES # BLD AUTO: 2.58 THOUSANDS/ÂΜL (ref 0.6–4.47)
LYMPHOCYTES NFR BLD AUTO: 25 % (ref 14–44)
MCH RBC QN AUTO: 26.8 PG (ref 26.8–34.3)
MCHC RBC AUTO-ENTMCNC: 31.6 G/DL (ref 31.4–37.4)
MCV RBC AUTO: 85 FL (ref 82–98)
MONOCYTES # BLD AUTO: 0.66 THOUSAND/ÂΜL (ref 0.17–1.22)
MONOCYTES NFR BLD AUTO: 6 % (ref 4–12)
NEUTROPHILS # BLD AUTO: 6.95 THOUSANDS/ÂΜL (ref 1.85–7.62)
NEUTS SEG NFR BLD AUTO: 67 % (ref 43–75)
NONHDLC SERPL-MCNC: 155 MG/DL
NRBC BLD AUTO-RTO: 0 /100 WBCS
PLATELET # BLD AUTO: 344 THOUSANDS/UL (ref 149–390)
PMV BLD AUTO: 11.5 FL (ref 8.9–12.7)
POTASSIUM SERPL-SCNC: 3.8 MMOL/L (ref 3.5–5.3)
PROT SERPL-MCNC: 7.7 G/DL (ref 6.4–8.4)
RBC # BLD AUTO: 5.04 MILLION/UL (ref 3.81–5.12)
SODIUM SERPL-SCNC: 136 MMOL/L (ref 135–147)
TRIGL SERPL-MCNC: 203 MG/DL
TSH SERPL DL<=0.05 MIU/L-ACNC: 2.3 UIU/ML (ref 0.45–4.5)
WBC # BLD AUTO: 10.45 THOUSAND/UL (ref 4.31–10.16)

## 2024-07-12 PROCEDURE — 83036 HEMOGLOBIN GLYCOSYLATED A1C: CPT

## 2024-07-12 PROCEDURE — 87389 HIV-1 AG W/HIV-1&-2 AB AG IA: CPT

## 2024-07-12 PROCEDURE — 82306 VITAMIN D 25 HYDROXY: CPT

## 2024-07-12 PROCEDURE — 80053 COMPREHEN METABOLIC PANEL: CPT

## 2024-07-12 PROCEDURE — 85025 COMPLETE CBC W/AUTO DIFF WBC: CPT

## 2024-07-12 PROCEDURE — 84443 ASSAY THYROID STIM HORMONE: CPT

## 2024-07-12 PROCEDURE — 80061 LIPID PANEL: CPT

## 2024-07-12 PROCEDURE — 86803 HEPATITIS C AB TEST: CPT

## 2024-07-12 PROCEDURE — 36415 COLL VENOUS BLD VENIPUNCTURE: CPT

## 2024-07-13 LAB — HCV AB SER QL: NORMAL

## 2024-07-16 NOTE — RESULT ENCOUNTER NOTE
Hi  I received and reviewed your recent labs and everything looked good. Please reach out with any specific questions or concerns. Have a great day  -Elsi

## 2024-11-20 DIAGNOSIS — G43.809 OTHER MIGRAINE WITHOUT STATUS MIGRAINOSUS, NOT INTRACTABLE: ICD-10-CM

## 2024-11-20 DIAGNOSIS — E28.2 PCOS (POLYCYSTIC OVARIAN SYNDROME): ICD-10-CM

## 2024-12-18 ENCOUNTER — PATIENT MESSAGE (OUTPATIENT)
Dept: INTERNAL MEDICINE CLINIC | Facility: CLINIC | Age: 34
End: 2024-12-18

## 2024-12-18 ENCOUNTER — OFFICE VISIT (OUTPATIENT)
Dept: INTERNAL MEDICINE CLINIC | Facility: CLINIC | Age: 34
End: 2024-12-18
Payer: COMMERCIAL

## 2024-12-18 ENCOUNTER — TELEPHONE (OUTPATIENT)
Age: 34
End: 2024-12-18

## 2024-12-18 ENCOUNTER — TELEPHONE (OUTPATIENT)
Dept: INTERNAL MEDICINE CLINIC | Facility: CLINIC | Age: 34
End: 2024-12-18

## 2024-12-18 VITALS
SYSTOLIC BLOOD PRESSURE: 112 MMHG | BODY MASS INDEX: 50.02 KG/M2 | HEART RATE: 122 BPM | DIASTOLIC BLOOD PRESSURE: 84 MMHG | OXYGEN SATURATION: 99 % | TEMPERATURE: 97 F | WEIGHT: 293 LBS | HEIGHT: 64 IN

## 2024-12-18 DIAGNOSIS — E66.01 CLASS 3 SEVERE OBESITY DUE TO EXCESS CALORIES WITHOUT SERIOUS COMORBIDITY WITH BODY MASS INDEX (BMI) OF 50.0 TO 59.9 IN ADULT (HCC): ICD-10-CM

## 2024-12-18 DIAGNOSIS — G43.809 OTHER MIGRAINE WITHOUT STATUS MIGRAINOSUS, NOT INTRACTABLE: Primary | ICD-10-CM

## 2024-12-18 DIAGNOSIS — Z23 ENCOUNTER FOR IMMUNIZATION: ICD-10-CM

## 2024-12-18 DIAGNOSIS — E66.813 CLASS 3 SEVERE OBESITY DUE TO EXCESS CALORIES WITHOUT SERIOUS COMORBIDITY WITH BODY MASS INDEX (BMI) OF 50.0 TO 59.9 IN ADULT (HCC): ICD-10-CM

## 2024-12-18 PROCEDURE — 99213 OFFICE O/P EST LOW 20 MIN: CPT | Performed by: PHYSICIAN ASSISTANT

## 2024-12-18 NOTE — ASSESSMENT & PLAN NOTE
Pt tried and failed many different medications as noted in HPI. Start nurtec. Directions for use and possible side effects discussed and patient verbalized understanding of these.    Orders:  •  rimegepant sulfate (NURTEC) 75 mg TBDP; Take 1 tablet (75 mg total) by mouth every other day

## 2024-12-18 NOTE — PROGRESS NOTES
Name: Andreia Lee      : 1990      MRN: [id number]  Encounter Provider: Elsi Beckman PA-C  Encounter Date: 2024   Encounter department: [facility]  :  Assessment & Plan  Encounter for immunization    Orders:  •  influenza vaccine preservative-free 0.5 mL IM (Fluzone, Afluria, Fluarix, Flulaval)  •  TDAP VACCINE GREATER THAN OR EQUAL TO 8YO IM    Other migraine without status migrainosus, not intractable  Pt tried and failed many different medications as noted in HPI. Start nurtec. Directions for use and possible side effects discussed and patient verbalized understanding of these.    Orders:  •  rimegepant sulfate (NURTEC) 75 mg TBDP; Take 1 tablet (75 mg total) by mouth every other day    Class 3 severe obesity due to excess calories without serious comorbidity with body mass index (BMI) of 50.0 to 59.9 in adult (HCC)      Orders:  •  Ambulatory Referral to Weight Management; Future           History of Present Illness     Pt presents for follow up on her chronic migraine. She has tried and failed imitrex, maxalt, elavil, inderal, topamax, and fioricet. She notes headaches frequently and intensity varies. She gets associated sensitivity to light and sound.   She also would like to pursue bariatric surgery to assist her weight loss efforts;       Review of Systems   Constitutional:  Negative for chills and fever.   HENT:  Negative for congestion, ear pain, hearing loss, postnasal drip, rhinorrhea, sinus pressure, sinus pain, sore throat and trouble swallowing.    Eyes:  Negative for pain and visual disturbance.   Respiratory:  Negative for cough, chest tightness, shortness of breath and wheezing.    Cardiovascular: Negative.  Negative for chest pain, palpitations and leg swelling.   Gastrointestinal:  Negative for abdominal pain, blood in stool, constipation, diarrhea, nausea and vomiting.   Endocrine: Negative for cold intolerance, heat intolerance, polydipsia, polyphagia  "and polyuria.   Genitourinary:  Negative for difficulty urinating, dysuria, flank pain and urgency.   Musculoskeletal:  Negative for arthralgias, back pain, gait problem and myalgias.   Skin:  Negative for rash.   Allergic/Immunologic: Negative.    Neurological:  Positive for headaches. Negative for dizziness, weakness and light-headedness.   Hematological: Negative.    Psychiatric/Behavioral:  Negative for behavioral problems, dysphoric mood and sleep disturbance. The patient is not nervous/anxious.        Objective   /84   Pulse (!) 122   Temp (!) 97 °F (36.1 °C)   Ht 5' 3.5\" (1.613 m)   Wt (!) 141 kg (311 lb)   LMP 12/14/2021 (Approximate)   SpO2 99%   BMI 54.23 kg/m²      Physical Exam  Vitals and nursing note reviewed.   Constitutional:       General: She is not in acute distress.     Appearance: Normal appearance. She is well-developed. She is obese. She is not diaphoretic.   HENT:      Head: Normocephalic and atraumatic.      Right Ear: External ear normal.      Left Ear: External ear normal.      Nose: Nose normal.      Mouth/Throat:      Pharynx: No oropharyngeal exudate.   Eyes:      General: No scleral icterus.        Right eye: No discharge.         Left eye: No discharge.      Conjunctiva/sclera: Conjunctivae normal.      Pupils: Pupils are equal, round, and reactive to light.   Neck:      Thyroid: No thyromegaly.   Cardiovascular:      Rate and Rhythm: Normal rate and regular rhythm.      Heart sounds: Normal heart sounds. No murmur heard.     No friction rub. No gallop.   Pulmonary:      Effort: Pulmonary effort is normal. No respiratory distress.      Breath sounds: Normal breath sounds. No wheezing or rales.   Abdominal:      General: Bowel sounds are normal. There is no distension.      Palpations: Abdomen is soft.      Tenderness: There is no abdominal tenderness.   Musculoskeletal:         General: No tenderness or deformity. Normal range of motion.      Cervical back: Normal range " of motion and neck supple.   Skin:     General: Skin is warm and dry.   Neurological:      Mental Status: She is alert and oriented to person, place, and time.      Cranial Nerves: No cranial nerve deficit.   Psychiatric:         Behavior: Behavior normal.         Thought Content: Thought content normal.         Judgment: Judgment normal.       Administrative Statements   I have spent a total time of 15 minutes in caring for this patient on the day of the visit/encounter including Instructions for management, Patient and family education, Importance of tx compliance, Risk factor reductions, Impressions, Counseling / Coordination of care, Documenting in the medical record, Reviewing / ordering tests, medicine, procedures  , and Obtaining or reviewing history  .

## 2024-12-18 NOTE — TELEPHONE ENCOUNTER
PA for Nurtec 75 mg  APPROVED     Date(s) approved December 18, 2024 to December 18, 2025     Case #[id number]     Patient advised by          []FittingRoomhart Message  [x]Phone call   []LMOM  []L/M to call office as no active Communication consent on file  []Unable to leave detailed message as VM not approved on Communication consent       Pharmacy advised by    [x]Fax  []Phone call    Approval letter scanned into Media Yes

## 2024-12-18 NOTE — TELEPHONE ENCOUNTER
HISTORY OF PRESENT ILLNESS  Magan Hu is a 78 y.o. female. HPI    Last here 12/14/21. Pt is here to f/u on chronic conditions.       Has a history of hypertension  BP today is 136/67  BP at home 130s/60s  Continues on losartan-HCTZ 100-25mg and norvasc 2.5mg daily      Wt today is 158 lbs, up 4 lbs x lov   Discussed continued diet and w/l      Reviewed labs  Will get labs today    Reviewed cxr 5/02/22:  No acute process. Pt now follows with Dr. Reba Daily (derm)   Last visit was 4/19   Saw Dr. Paulo Tejeda 6/22     Pt saw Dr Ian Carpneter ([facility] gyn) for recurrent UTI sx  Pt has not had any UTIs since then   No longer taking imvexxy        She saw Dr. Pratik Polanco (pulm) for cough   Last there 5/22 with PA- changed arnuity to breo, was also given prednisone and tessalon   Continues singulair daily and flovent once daily   She is now using breo instead of arnuity   Also uses allegra  She also has albuterol to use prn for breathing - not needed recently   Recall advair caused a rash      She had thumb surgery with Dr. Jennifer Garg (ortho) for de quervain's tenosynovitis       Recall  ECHO 2/19: EF 56-60%, mild dilation of L atrial cavity     ACP not on file.  SDM is her .   Pt has this information at home and will bring it in.      PREVENTIVE:    Colonoscopy: 11/19/21 Dr. Joselyn Winter 5 year repeat  Pap: [address] 9/30/21  Mammogram: 9/30/21 [address] nl  DEXA: 9/30/21 osteopenia [address]  AAA: no FMHX  Tdap: 5/13/2013  Pneumovax: 10/23/17  Zecuwzo94: 10/18/2016  Zostavax: 9/25/2015  Shingrix: completed both in 2019  Flu shot: 10/21  A1c:  10/18 5.9 5/19 5.9 11/19 5.8 6/21 5.8  Eye exam: Dr. Ricki Mitchell cataract surgery 3/22, most recently 5/22  Lipids: 6/21   EKG: 10/23/17, nsr  AAA: 5/19, negative with CT abd   moderna x 3  Patient Active Problem List    Diagnosis Date Noted    Cyst of tendon sheath 04/09/2012    Depression, reactive 06/27/2011    HTN (hypertension) 12/14/2009    Asthma 12/14/2009    Allergic rhinitis Patient informed   12/14/2009     Current Outpatient Medications   Medication Sig Dispense Refill    Breo Ellipta 100-25 mcg/dose inhaler INHALE 1 PUFF BY MOUTH DAILY      albuterol (PROVENTIL HFA, VENTOLIN HFA, PROAIR HFA) 90 mcg/actuation inhaler INHALE 2 PUFFS BY MOUTH EVERY 4 HOURS AS NEEDED FOR WHEEZING 18 g 0    amLODIPine (NORVASC) 2.5 mg tablet TAKE 1 TABLET BY MOUTH DAILY 90 Tablet 1    losartan-hydroCHLOROthiazide (HYZAAR) 100-25 mg per tablet TAKE 1 TABLET BY MOUTH EVERY DAY 90 Tablet 0    montelukast (SINGULAIR) 10 mg tablet TAKE 1 TABLET BY MOUTH EVERY DAY 90 Tablet 0    fluticasone propionate (FLONASE NA) by Nasal route.        Past Surgical History:   Procedure Laterality Date    COLONOSCOPY N/A 11/19/2021    COLONOSCOPY performed by Sakshi Hook MD at [facility]; HI RISK IND  11/19/2021         HX CATARACT REMOVAL      HX ORTHOPAEDIC      back    HX ORTHOPAEDIC Right     thumb surgery      Lab Results   Component Value Date/Time    WBC 7.5 06/15/2021 09:11 AM    HGB 9.8 (L) 06/15/2021 09:11 AM    HCT 31.2 (L) 06/15/2021 09:11 AM    PLATELET 008 45/16/2012 09:11 AM    MCV 94.3 06/15/2021 09:11 AM     Lab Results   Component Value Date/Time    Cholesterol, total 204 (H) 06/15/2021 09:11 AM    HDL Cholesterol 60 06/15/2021 09:11 AM    LDL, calculated 127 (H) 06/15/2021 09:11 AM    Triglyceride 85 06/15/2021 09:11 AM    CHOL/HDL Ratio 3.4 06/15/2021 09:11 AM     Lab Results   Component Value Date/Time    GFR est non-AA 41 (L) 06/15/2021 09:11 AM    GFRNA, POC 55 (L) 05/06/2019 01:14 PM    GFR est AA 50 (L) 06/15/2021 09:11 AM    GFRAA, POC >60 05/06/2019 01:14 PM    Creatinine 1.28 (H) 06/15/2021 09:11 AM    Creatinine (POC) 1.0 05/06/2019 01:14 PM    BUN 29 (H) 06/15/2021 09:11 AM    Sodium 142 06/15/2021 09:11 AM    Potassium 3.7 06/15/2021 09:11 AM    Chloride 108 06/15/2021 09:11 AM    CO2 28 06/15/2021 09:11 AM        Review of Systems   Respiratory: Negative for shortness of breath. Cardiovascular: Negative for chest pain. Physical Exam  Constitutional:       General: She is not in acute distress. Appearance: Normal appearance. She is not ill-appearing, toxic-appearing or diaphoretic. HENT:      Head: Normocephalic and atraumatic. Right Ear: External ear normal.      Left Ear: External ear normal.   Eyes:      General:         Right eye: No discharge. Left eye: No discharge. Conjunctiva/sclera: Conjunctivae normal.      Pupils: Pupils are equal, round, and reactive to light. Cardiovascular:      Rate and Rhythm: Normal rate and regular rhythm. Heart sounds: No murmur heard. No friction rub. No gallop. Pulmonary:      Effort: No respiratory distress. Breath sounds: Normal breath sounds. No wheezing or rales. Chest:      Chest wall: No tenderness. Musculoskeletal:         General: Normal range of motion. Cervical back: Normal range of motion and neck supple. Skin:     General: Skin is warm and dry. Neurological:      Mental Status: She is alert and oriented to person, place, and time. Mental status is at baseline. Coordination: Coordination normal.      Gait: Gait normal.   Psychiatric:         Mood and Affect: Mood normal.         Behavior: Behavior normal.         ASSESSMENT and PLAN    ICD-10-CM ICD-9-CM    1. Primary hypertension  I10 401.9 LIPID PANEL      METABOLIC PANEL, COMPREHENSIVE      HEMOGLOBIN A1C WITH EAG      TSH 3RD GENERATION      CBC W/O DIFF   Well-controlled on losartan hydrochlorothiazide and amlodipine check labs for K and creatinine levels   FERRITIN      IRON PROFILE      IRON PROFILE      FERRITIN      CBC W/O DIFF      TSH 3RD GENERATION      HEMOGLOBIN A1C WITH EAG      METABOLIC PANEL, COMPREHENSIVE      LIPID PANEL   2.  Moderate persistent asthma without complication  K28.81 328.24 LIPID PANEL      METABOLIC PANEL, COMPREHENSIVE   Up-to-date with pulmonary and had a recent flare she is now on Breo completed some steroids back to baseline   HEMOGLOBIN A1C WITH EAG      TSH 3RD GENERATION      CBC W/O DIFF      FERRITIN      IRON PROFILE      IRON PROFILE      FERRITIN      CBC W/O DIFF      TSH 3RD GENERATION      HEMOGLOBIN A1C WITH EAG      METABOLIC PANEL, COMPREHENSIVE      LIPID PANEL   3. IFG (impaired fasting glucose)  R73.01 790.21 LIPID PANEL      METABOLIC PANEL, COMPREHENSIVE      HEMOGLOBIN A1C WITH EAG   Check A1c diet controlled   TSH 3RD GENERATION      CBC W/O DIFF      FERRITIN      IRON PROFILE      IRON PROFILE      FERRITIN      CBC W/O DIFF      TSH 3RD GENERATION      HEMOGLOBIN A1C WITH EAG      METABOLIC PANEL, COMPREHENSIVE      LIPID PANEL   4. Mixed hyperlipidemia  E78.2 272.2 LIPID PANEL      METABOLIC PANEL, COMPREHENSIVE      HEMOGLOBIN A1C WITH EAG      TSH 3RD GENERATION   Diet controlled check lipids treat further as needed   CBC W/O DIFF      FERRITIN      IRON PROFILE      IRON PROFILE      FERRITIN      CBC W/O DIFF      TSH 3RD GENERATION      HEMOGLOBIN A1C WITH EAG      METABOLIC PANEL, COMPREHENSIVE      LIPID PANEL   5. Anemia, unspecified type  D64.9 285.9 LIPID PANEL      METABOLIC PANEL, COMPREHENSIVE      HEMOGLOBIN A1C WITH EAG      TSH 3RD GENERATION   Patient never saw hematology which we discussed previously and did not go back for repeat labs    Reordered labs for today    We will send her to hematology if still anemic   CBC W/O DIFF      FERRITIN      IRON PROFILE      IRON PROFILE      FERRITIN      CBC W/O DIFF      TSH 3RD GENERATION      HEMOGLOBIN A1C WITH EAG      METABOLIC PANEL, COMPREHENSIVE      LIPID PANEL        Scribed by Jaiden Reynoso of [address], as dictated by Dr. Giulia García. Current diagnosis and concerns discussed with pt at length. Pt understands risks and benefits or current treatment plan and medications, and accepts the treatment and medication with any possible risks.  Pt asks appropriate questions, which were answered. Pt was instructed to call with any concerns or problems. I have reviewed the note documented by the scribe. The services provided are my own.   The documentation is accurate

## 2024-12-18 NOTE — PATIENT COMMUNICATION
Called and spoke with patient to inform her that her insurance approved the Nurtec 75 mg. Patient is asking there PCP has any coupons that aren't . Patient would like a call back.

## 2024-12-18 NOTE — TELEPHONE ENCOUNTER
PA for rimegepant sulfate (NURTEC) 75 mg TBDP SUBMITTED to     via    []CMM-KEY:   [x]Surescripts-Case ID # [id number]   []Availity-Auth ID # NDC #   []Faxed to plan   []Other website   []Phone call Case ID #     [x]PA sent as URGENT    All office notes, labs and other pertaining documents and studies sent. Clinical questions answered. Awaiting determination from insurance company.     Turnaround time for your insurance to make a decision on your Prior Authorization can take 7-21 business days.

## 2024-12-24 ENCOUNTER — TELEPHONE (OUTPATIENT)
Age: 34
End: 2024-12-24

## 2024-12-24 NOTE — TELEPHONE ENCOUNTER
Patient called in regarding referral on file. She is interested in bariatric surgery and would like to schedule on the surgical side. Please call the patient at [phone] to schedule accordingly.

## 2024-12-29 DIAGNOSIS — G43.809 OTHER MIGRAINE WITHOUT STATUS MIGRAINOSUS, NOT INTRACTABLE: Primary | ICD-10-CM

## 2025-01-03 ENCOUNTER — APPOINTMENT (OUTPATIENT)
Dept: RADIOLOGY | Facility: CLINIC | Age: 35
End: 2025-01-03
Payer: OTHER MISCELLANEOUS

## 2025-01-03 ENCOUNTER — OCCMED (OUTPATIENT)
Dept: URGENT CARE | Facility: CLINIC | Age: 35
End: 2025-01-03
Payer: OTHER MISCELLANEOUS

## 2025-01-03 DIAGNOSIS — S39.011A FLANK STRAIN, INITIAL ENCOUNTER: ICD-10-CM

## 2025-01-03 DIAGNOSIS — R10.9 ACUTE RIGHT FLANK PAIN: ICD-10-CM

## 2025-01-03 DIAGNOSIS — S29.012A STRAIN OF THORACIC BACK REGION: Primary | ICD-10-CM
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
## 2025-01-03 PROCEDURE — 99284 EMERGENCY DEPT VISIT MOD MDM: CPT | Performed by: NURSE PRACTITIONER

## 2025-01-03 PROCEDURE — G0383 LEV 4 HOSP TYPE B ED VISIT: HCPCS | Performed by: NURSE PRACTITIONER

## 2025-01-03 PROCEDURE — 72070 X-RAY EXAM THORAC SPINE 2VWS: CPT

## 2025-01-08 ENCOUNTER — OCCMED (OUTPATIENT)
Dept: URGENT CARE | Facility: CLINIC | Age: 35
End: 2025-01-08
Payer: OTHER MISCELLANEOUS

## 2025-01-08 DIAGNOSIS — S29.012D STRAIN OF MUSCLE AND TENDON OF BACK WALL OF THORAX, SUBSEQUENT ENCOUNTER: Primary | ICD-10-CM

## 2025-01-08 DIAGNOSIS — S39.012D STRAIN OF BACK, SUBSEQUENT ENCOUNTER: ICD-10-CM

## 2025-01-08 PROCEDURE — 99213 OFFICE O/P EST LOW 20 MIN: CPT | Performed by: NURSE PRACTITIONER

## 2025-01-18 DIAGNOSIS — G43.809 OTHER MIGRAINE WITHOUT STATUS MIGRAINOSUS, NOT INTRACTABLE: ICD-10-CM

## 2025-01-23 ENCOUNTER — OFFICE VISIT (OUTPATIENT)
Dept: NEUROLOGY | Facility: CLINIC | Age: 35
End: 2025-01-23
Payer: COMMERCIAL

## 2025-01-23 VITALS
WEIGHT: 293 LBS | OXYGEN SATURATION: 97 % | DIASTOLIC BLOOD PRESSURE: 88 MMHG | HEIGHT: 64 IN | TEMPERATURE: 98.2 F | HEART RATE: 111 BPM | BODY MASS INDEX: 50.02 KG/M2 | SYSTOLIC BLOOD PRESSURE: 129 MMHG

## 2025-01-23 DIAGNOSIS — R06.83 SNORING: ICD-10-CM

## 2025-01-23 DIAGNOSIS — G43.809 OTHER MIGRAINE WITHOUT STATUS MIGRAINOSUS, NOT INTRACTABLE: Primary | ICD-10-CM

## 2025-01-23 PROCEDURE — 99205 OFFICE O/P NEW HI 60 MIN: CPT | Performed by: NURSE PRACTITIONER

## 2025-01-23 RX ORDER — AMITRIPTYLINE HYDROCHLORIDE 50 MG/1
50 TABLET ORAL
Qty: 90 TABLET | Refills: 3 | Status: SHIPPED | OUTPATIENT
Start: 2025-01-23

## 2025-01-23 RX ORDER — RIMEGEPANT SULFATE 75 MG/75MG
TABLET, ORALLY DISINTEGRATING ORAL
Qty: 8 TABLET | Refills: 3 | Status: SHIPPED | OUTPATIENT
Start: 2025-01-23

## 2025-01-23 RX ORDER — BUTALBITAL, ACETAMINOPHEN AND CAFFEINE 300; 40; 50 MG/1; MG/1; MG/1
CAPSULE ORAL
Qty: 30 CAPSULE | Refills: 1 | Status: SHIPPED | OUTPATIENT
Start: 2025-01-23 | End: 2025-01-30

## 2025-01-23 NOTE — PATIENT INSTRUCTIONS
Additional Testing:    -I am recommending further Neurodiagnostic workup at this time:  MRI Brain ordered    Headache/migraine treatment:    Prevention: To take every day to help prevent headaches - not to take at the time of headache:  - Continue Elavil, increase dose to 50mg     -Over the counter preventive supplements for headaches/migraines (if you try, try for 3 months straight):  -Magnesium 400mg daily (If any diarrhea or upset stomach, decrease dose as tolerated)  -Riboflavin (Vitamin B2) 400mg daily (may make your urine bright/neon yellow)  - Vitamin B 12 1,000mcg daily  - Coenzyme Q10 (around 100mg)  - All supplements can be purchased online    Abortive: For immediate treatment of a headache/migraine:  - Nurtec 75 mg at headache onset  - Fiorecet along with Nurtec at onset of headache, can also take Fiorecet every 6 hours as needed    -It is ok to take ibuprofen, acetaminophen or naproxen (Advil, Tylenol,  Aleve, Excedrin) if they help your headaches you should limit these to No more than 3 times a week to avoid medication overuse/rebound headaches.     Lifestyle Recommendations:  -Remain well-hydrated drinking at least 48 to 64 ounces of noncaffeinated beverages per day in addition to anything caffeinated.    -It is important to eat meals throughout the day and not go long periods of time between eating.  -Getting adequate rest is also very important for migraine prevention (aim for 7-8 hours per night).     -Regular exercise is also beneficial for headache prevention.  I would encourage at the least 5 days of physical exercise weekly for at least 30 minutes.   -I would like for them to keep track of their migraines using an application on their phone or calendar as they see fit. Phone applications: Migraine Buddy or Migraine Diary.    Education and Follow-up:  -Please call or send a [facility] message with any questions or concerns. Please present to the emergency room with any concerning symptoms such as:  worst headache of your life, sudden painless loss of vision or double vision, difficulty speaking or swallowing, vertigo/room spinning that does not quickly resolve, or weakness/numbness/loss of coordination affecting 1 side of the face or body.    Referral to sleep medicine for evaluation of sleep apnea     -Follow up in 2 months
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
## 2025-01-24 NOTE — ASSESSMENT & PLAN NOTE
34 y.o. female, with HTN, HLD, obesity, PCOS, and anxiety who is presenting to [facility] for evaluation of her headaches.     Diagnosis: Migraine headache    Plan:  Prevention:  Start Magnesium oxide supplement 400mg daily and Riboflavin (B2) supplement 400mg daily   Continue amitriptyline, increase dose to 50 mg at bedtime  Discussed use of injectable CGRP medications.  However, patient is hesitant to use these at this time.      Abortive:  Nurtec for use at onset of headache   Fioricet as needed, recommend to take Fioricet and Nurtec at onset of headache  Patient was cautioned to note take NSAIDS (Tylenol, Ibuprofen, Excedrin) more than 3 times a week as this can lead to rebound headaches.    Lifestyle modifications were also discussed, including drinking 48-64 oz of water daily, eating regular meals, getting adequate sleep, and regular exercise.     Imaging: MRI brain without contrast to evaluate for possible intracranial hypertension.     Other Concerns: See below    Follow-up: 2 months

## 2025-01-29 ENCOUNTER — TELEPHONE (OUTPATIENT)
Age: 35
End: 2025-01-29

## 2025-01-29 DIAGNOSIS — G43.809 OTHER MIGRAINE WITHOUT STATUS MIGRAINOSUS, NOT INTRACTABLE: Primary | ICD-10-CM

## 2025-01-30 RX ORDER — BUTALBITAL, ACETAMINOPHEN AND CAFFEINE 50; 325; 40 MG/1; MG/1; MG/1
1 TABLET ORAL EVERY 4 HOURS PRN
Qty: 30 TABLET | Refills: 1 | Status: SHIPPED | OUTPATIENT
Start: 2025-01-30

## 2025-01-30 NOTE — TELEPHONE ENCOUNTER
"Phone call to [employer] [phone].   Spoke with Jessica who advised patient has a specialized account. Jessica did an internal transfer.     Spoke with representative Eliazar. Eliazar's direct line: [phone]. Eliazar stated per technician's internal notes, \"Migraine is not an approved compendia medication.\"    Formulary alternatives:    Fioricet w Codeine #30  $409.52  2.   Fioricet w Codeine with home delivery with \"[employer],\" #90 $140.  3.   Butalbital-Acetaminophen  #30  $15  4.   Ascomp-Codeine #30  $30  5.   Esgic #30  $15    Then Eliazar gave the follow comparable medication:    Butalbital-APAP-Caffeine (Fioricet) -40 MG CAPS #30 $4 NDC [id number]  \"Showing status as covered.\"    Butalbital-APAP-Caffeine (Fioricet) -40 MG CAPS #30  $15 NDC [id number]  \"Showing status as covered.\"    Zoraida,   Are you agreeable to writing a new script for   Butalbital-APAP-Caffeine (Fioricet) -40 MG CAPS? Please advise. Thank you!          "

## 2025-01-30 NOTE — TELEPHONE ENCOUNTER
Butalbital-APAP-Caffeine Prior Authorization      KEY: [id number]    Denied on January 29 by [facility] 2017    You must have a condition that is supported in compendia for the requested drug. The compendia approved sources are: 1). Food and Drug Administration (FDA), 2) [facility] Formulary Service (AHFS), 3) Drug Dex with 1 or 2a level of evidence, and 40 National Comprehensive Cancer Network.     Please note: If your provider tells up you have an approved condition for this drug, you may have to try formulary alternative drugs for that condition. You may not have tried these drugs if your prescriber says you cannot take them do to side effect, FDA contraindication or allergies.    Zoraida, please advise. Thank you!

## 2025-01-31 ENCOUNTER — HOSPITAL ENCOUNTER (OUTPATIENT)
Dept: MRI IMAGING | Facility: HOSPITAL | Age: 35
End: 2025-01-31
Payer: COMMERCIAL

## 2025-01-31 DIAGNOSIS — G43.809 OTHER MIGRAINE WITHOUT STATUS MIGRAINOSUS, NOT INTRACTABLE: ICD-10-CM

## 2025-01-31 PROCEDURE — 70551 MRI BRAIN STEM W/O DYE: CPT

## 2025-02-04 ENCOUNTER — RESULTS FOLLOW-UP (OUTPATIENT)
Dept: NEUROLOGY | Facility: CLINIC | Age: 35
End: 2025-02-04

## 2025-02-07 ENCOUNTER — TELEPHONE (OUTPATIENT)
Age: 35
End: 2025-02-07

## 2025-02-07 DIAGNOSIS — G43.809 OTHER MIGRAINE WITHOUT STATUS MIGRAINOSUS, NOT INTRACTABLE: ICD-10-CM

## 2025-02-07 NOTE — TELEPHONE ENCOUNTER
Patient called about prescription NURTEC, current pharmacy does not carry medication, asked if it can be sent to:    [facility] in [place]  [address], [place] [address]      Added to patients chart as well  t

## 2025-02-08 ENCOUNTER — PATIENT MESSAGE (OUTPATIENT)
Dept: NEUROLOGY | Facility: CLINIC | Age: 35
End: 2025-02-08

## 2025-02-10 RX ORDER — RIMEGEPANT SULFATE 75 MG/75MG
TABLET, ORALLY DISINTEGRATING ORAL
Qty: 8 TABLET | Refills: 3 | Status: SHIPPED | OUTPATIENT
Start: 2025-02-10

## 2025-02-10 NOTE — TELEPHONE ENCOUNTER
Zoraida,  Per patient's request, please cancel original script for Nurtec and sign pended script to be sent to [facility] in [place] [id number], [place] [address]

## 2025-02-11 NOTE — TELEPHONE ENCOUNTER
Patient called stated that [facility] pharmacy informed her [facility] requires a PA.    Please assist

## 2025-02-12 ENCOUNTER — TELEPHONE (OUTPATIENT)
Age: 35
End: 2025-02-12

## 2025-02-12 NOTE — TELEPHONE ENCOUNTER
"[employer] Prior Authorization    KEY:  [id number]  \"No coverage was found. Please reconfirm the patient's plan before submitting. You may also return to the dashboard and select a Medicare form if the patient has Medicare Part D coverage.\"  --------------------------------------------------------------  Called [employer] for update insurance information.    BIN# [id number]  N# [id number]  GROUP# [id number]    MEMBER ID# [id number]  ---------------------------------------------------------------    KEY: [id number]    APPROVED    \"Your request was approved based on the initial information provided at the time of the coverage request submission. Please allow additional time for the final decision to be made and added to the patient's account.\"  ------------------------------------------------------------------------  Called [facility]. Pharmacist. Advised same. Pharmacist got a darlene claim.   ---------------------------------------------------------------------------  Phone call to patient. Advised same. Patient voiced clear understanding. Nothing further needed,       "

## 2025-02-12 NOTE — TELEPHONE ENCOUNTER
Patient called to reschedule her appointment for today with Dr Wing due to weather related. Please call patient at [phone]

## 2025-02-18 NOTE — TELEPHONE ENCOUNTER
What does this mean? Has the Fi[employer]et been denied? Patient has migraine headaches which should be an approved diagnosis. If not what other medications are they suggesting?    My sister

## 2025-03-12 ENCOUNTER — OFFICE VISIT (OUTPATIENT)
Dept: BARIATRICS | Facility: CLINIC | Age: 35
End: 2025-03-12
Payer: COMMERCIAL

## 2025-03-12 VITALS
WEIGHT: 293 LBS | RESPIRATION RATE: 12 BRPM | SYSTOLIC BLOOD PRESSURE: 160 MMHG | DIASTOLIC BLOOD PRESSURE: 98 MMHG | BODY MASS INDEX: 50.02 KG/M2 | HEIGHT: 64 IN

## 2025-03-12 DIAGNOSIS — E78.2 COMBINED HYPERLIPIDEMIA: Primary | ICD-10-CM

## 2025-03-12 DIAGNOSIS — G43.809 OTHER MIGRAINE WITHOUT STATUS MIGRAINOSUS, NOT INTRACTABLE: ICD-10-CM

## 2025-03-12 DIAGNOSIS — E28.2 PCOS (POLYCYSTIC OVARIAN SYNDROME): ICD-10-CM

## 2025-03-12 DIAGNOSIS — E66.01 CLASS 3 SEVERE OBESITY DUE TO EXCESS CALORIES WITHOUT SERIOUS COMORBIDITY WITH BODY MASS INDEX (BMI) OF 50.0 TO 59.9 IN ADULT (HCC): ICD-10-CM

## 2025-03-12 DIAGNOSIS — E66.813 CLASS 3 SEVERE OBESITY DUE TO EXCESS CALORIES WITHOUT SERIOUS COMORBIDITY WITH BODY MASS INDEX (BMI) OF 50.0 TO 59.9 IN ADULT (HCC): ICD-10-CM

## 2025-03-12 DIAGNOSIS — I10 PRIMARY HYPERTENSION: ICD-10-CM

## 2025-03-12 PROCEDURE — 99204 OFFICE O/P NEW MOD 45 MIN: CPT | Performed by: SURGERY

## 2025-03-12 NOTE — PROGRESS NOTES
BARIATRIC INITIAL CONSULT - BARIATRIC SURGERY    Andreia Lee 34 y.o. female MRN: [id number]  Unit/Bed#:  Encounter: [id number]      HPI:  Andreia Lee is a 34 y.o. female who presents with a longstanding history of morbid obesity and inability to sustain a meaningful weight loss.    Here today to discuss bariatric options.    Visit type: initial visit    Symptoms: excess weight and inability to loss weight    Associated Symptoms: depressed mood and anxiety    Associated Conditions: hyperlipidemia and abdominal obesity  Disease Complications: hypertension and migraine  and PCOS  Weight Loss Interest: high  Previous Diet Trials: low calorie     Exercise Frequency:infrequency  Types of Exercise: walking        Review of Systems   All other systems reviewed and are negative.      Historical Information   Past Medical History:   Diagnosis Date    Anemia     Anxiety     Asthma     Depression     Endometriosis     Fractures 2018     RIGHT TIB/FIB, RIBS , GREAT TOE     Hyperlipidemia     Hypertension     Only slightly elevated    Irregular menses     Migraines     Obesity     PCOS (polycystic ovarian syndrome)     Vitamin D deficiency      Past Surgical History:   Procedure Laterality Date    HYSTERECTOMY      KNEE ARTHROSCOPY      ORIF TIBIA & FIBULA FRACTURES Right     KS LAPS TOTAL HYSTERECT 250 GM/< W/RMVL TUBE/OVARY N/A 1/18/2022    Procedure: HYSTERECTOMY LAPAROSCOPIC TOTAL (LTH) W/ ROBOTICS, BILATERAL SALPINGECTOMY;  Surgeon: Aracely Carlos MD;  Location: [facility];  Service: Gynecology    WISDOM TOOTH EXTRACTION       Social History   Social History     Substance and Sexual Activity   Alcohol Use Yes    Comment: Drink very infrequently-maybe once a month     Social History     Substance and Sexual Activity   Drug Use Yes    Frequency: 3.0 times per week    Types: Marijuana    Comment: Use tintures for migraines     Social History     Tobacco Use   Smoking Status Never    Passive exposure: Never  "  Smokeless Tobacco Never     Family History: Family history non-contributory    Meds/Allergies   all medications and allergies reviewed  Allergies   Allergen Reactions    Penicillins Anaphylaxis    Sulfamethoxazole-Trimethoprim Rash       Objective       Current Vitals:   Blood Pressure: 160/98 (03/12/25 1314)  Respirations: 12 (03/12/25 1314)  Height: 5' 3.75\" (161.9 cm) (03/12/25 1314)  Weight - Scale: (!) 141 kg (310 lb) (03/12/25 1314)        Invasive Devices       None                   Physical Exam  Vitals reviewed.   Constitutional:       General: She is not in acute distress.     Appearance: She is well-developed. She is not diaphoretic.   HENT:      Head: Normocephalic and atraumatic.      Right Ear: External ear normal.      Left Ear: External ear normal.      Nose: Nose normal.   Eyes:      General: No scleral icterus.        Right eye: No discharge.         Left eye: No discharge.      Conjunctiva/sclera: Conjunctivae normal.   Neurological:      Mental Status: She is alert and oriented to person, place, and time.   Psychiatric:         Behavior: Behavior normal.         Thought Content: Thought content normal.         Judgment: Judgment normal.         Lab Results: I have personally reviewed pertinent lab results.    Imaging: Results Review Statement: No pertinent imaging studies reviewed.  EKG, Pathology, and Other Studies: Results Review Statement: No pertinent imaging studies reviewed.      Assessment/PLAN:    Migraine  Currently taking Esgic.  There is a chance of significant improvement after metabolic surgery.  Continue management as per primary care team    Hypertension  There is a chance of significant improvement after metabolic surgery.  Continue management as per primary care team    Combined hyperlipidemia  There is a chance of significant improvement after metabolic surgery.  Continue management as per primary care team    PCOS (polycystic ovarian syndrome)  Currently on Metformin.  There " is a chance of significant improvement after metabolic surgery.  Continue management as per primary care team    Class 3 severe obesity due to excess calories without serious comorbidity with body mass index (BMI) of 50.0 to 59.9 in adult (HCC)  34 y.o. female with a long standing h/o of obesity and inability to sustain any meaningful weight loss on her own despite several attempts.    She is interested in the Laparoscopic gastric bypass possible sleeve gastrectomy.  I have discussed both options with her today.    As a part of her pre op evaluation, she will be referred to a cardiologist for risk stratification and for a sleep evaluation and consult to rule out obstructive sleep apnea if deemed necessary based on her score.    She needs an EGD to evaluate the anatomy of her GI tract prior to the operation.  I have spent over 30 minutes with her face to face in the office today discussing her options and details of the surgery. We have seen an animation of the surgery on the computer that illustrates how the operation is done and how the anatomy will be altered with the procedure. Over 50% of this was coordinating care.    I have used the MBSAQIP bariatric surgical risk/benefit calculator and we have discussed the results as part of the preop education.  She was given the opportunity to ask questions and I have answered all of them.  I have discussed and educated the patient with regards to the components of our multidisciplinary program and the importance of compliance and follow up in the post operative period. The patient was also instructed with regards to the importance of behavior modification, nutritional counseling, support meeting attendance and lifestyle changes that are important to ensure success.     Although there is a great statistical chance of improvement or even resolution of most of her associated comorbidities, the results vary from patient to patient and they largely depend on her commitment and  compliance.     I have reviewed instructions for stopping or tapering anti-obesity medications prior to surgery.      I have encouraged her to lose 16 lbs prior to the operation.        Alexis Wing MD  3/12/2025  1:23 PM

## 2025-03-12 NOTE — ASSESSMENT & PLAN NOTE
Currently on Metformin.  There is a chance of significant improvement after metabolic surgery.  Continue management as per primary care team

## 2025-03-12 NOTE — ASSESSMENT & PLAN NOTE
There is a chance of significant improvement after metabolic surgery.  Continue management as per primary care team

## 2025-03-12 NOTE — ASSESSMENT & PLAN NOTE
34 y.o. female with a long standing h/o of obesity and inability to sustain any meaningful weight loss on her own despite several attempts.    She is interested in the Laparoscopic gastric bypass possible sleeve gastrectomy.  I have discussed both options with her today.    As a part of her pre op evaluation, she will be referred to a cardiologist for risk stratification and for a sleep evaluation and consult to rule out obstructive sleep apnea if deemed necessary based on her score.    She needs an EGD to evaluate the anatomy of her GI tract prior to the operation.  I have spent over 30 minutes with her face to face in the office today discussing her options and details of the surgery. We have seen an animation of the surgery on the computer that illustrates how the operation is done and how the anatomy will be altered with the procedure. Over 50% of this was coordinating care.    I have used the MBSAQIP bariatric surgical risk/benefit calculator and we have discussed the results as part of the preop education.  She was given the opportunity to ask questions and I have answered all of them.  I have discussed and educated the patient with regards to the components of our multidisciplinary program and the importance of compliance and follow up in the post operative period. The patient was also instructed with regards to the importance of behavior modification, nutritional counseling, support meeting attendance and lifestyle changes that are important to ensure success.     Although there is a great statistical chance of improvement or even resolution of most of her associated comorbidities, the results vary from patient to patient and they largely depend on her commitment and compliance.     I have reviewed instructions for stopping or tapering anti-obesity medications prior to surgery.      I have encouraged her to lose 16 lbs prior to the operation.

## 2025-03-12 NOTE — ASSESSMENT & PLAN NOTE
Currently taking Esgic.  There is a chance of significant improvement after metabolic surgery.  Continue management as per primary care team

## 2025-03-15 DIAGNOSIS — E28.2 PCOS (POLYCYSTIC OVARIAN SYNDROME): ICD-10-CM

## 2025-03-25 ENCOUNTER — CLINICAL SUPPORT (OUTPATIENT)
Dept: BARIATRICS | Facility: CLINIC | Age: 35
End: 2025-03-25

## 2025-03-25 DIAGNOSIS — Z98.84 BARIATRIC SURGERY STATUS: Primary | ICD-10-CM

## 2025-03-25 PROCEDURE — RECHECK

## 2025-04-03 ENCOUNTER — OFFICE VISIT (OUTPATIENT)
Dept: SLEEP CENTER | Facility: CLINIC | Age: 35
End: 2025-04-03
Payer: COMMERCIAL

## 2025-04-03 VITALS
DIASTOLIC BLOOD PRESSURE: 90 MMHG | HEART RATE: 101 BPM | SYSTOLIC BLOOD PRESSURE: 134 MMHG | TEMPERATURE: 98.1 F | BODY MASS INDEX: 50.02 KG/M2 | WEIGHT: 293 LBS | HEIGHT: 64 IN | OXYGEN SATURATION: 96 % | RESPIRATION RATE: 16 BRPM

## 2025-04-03 DIAGNOSIS — G43.809 OTHER MIGRAINE WITHOUT STATUS MIGRAINOSUS, NOT INTRACTABLE: ICD-10-CM

## 2025-04-03 DIAGNOSIS — R06.83 SNORING: Primary | ICD-10-CM

## 2025-04-03 DIAGNOSIS — G47.00 FREQUENT NOCTURNAL AWAKENING: ICD-10-CM

## 2025-04-03 DIAGNOSIS — E66.813 CLASS 3 SEVERE OBESITY WITH BODY MASS INDEX (BMI) OF 50.0 TO 59.9 IN ADULT, UNSPECIFIED OBESITY TYPE, UNSPECIFIED WHETHER SERIOUS COMORBIDITY PRESENT (HCC): ICD-10-CM

## 2025-04-03 DIAGNOSIS — E66.01 CLASS 3 SEVERE OBESITY WITH BODY MASS INDEX (BMI) OF 50.0 TO 59.9 IN ADULT, UNSPECIFIED OBESITY TYPE, UNSPECIFIED WHETHER SERIOUS COMORBIDITY PRESENT (HCC): ICD-10-CM

## 2025-04-03 PROCEDURE — 99243 OFF/OP CNSLTJ NEW/EST LOW 30: CPT | Performed by: STUDENT IN AN ORGANIZED HEALTH CARE EDUCATION/TRAINING PROGRAM

## 2025-04-03 RX ORDER — GLUCOSAMINE/D3/BOSWELLIA SERRA 1500MG-400
TABLET ORAL
COMMUNITY

## 2025-04-03 RX ORDER — BACLOFEN 20 MG
TABLET ORAL
COMMUNITY
Start: 2025-01-23

## 2025-04-03 RX ORDER — UBIDECARENONE 75 MG
CAPSULE ORAL
COMMUNITY
Start: 2025-01-23

## 2025-04-03 RX ORDER — CYANOCOBALAMIN (VITAMIN B-12) 500 MCG
TABLET ORAL
COMMUNITY
Start: 2025-01-23

## 2025-04-03 NOTE — PROGRESS NOTES
Name: Andreia Lee      : 1990      MRN: [id number]  Encounter Provider: Freddy Bustamante DO  Encounter Date: 4/3/2025   Encounter department: [facility] SLEEP [facility]IC[facility]  :  CONSULTING PROVIDER:  SHARON Kruger  [address] [place] [id number]    Assessment & Plan  Snoring  Andreia is a very pleasant 35-year-old woman with PMHx of migraine, HTN, PCOS, ADAM, obesity, HLD who presents for severe snoring.  She does certainly have several symptoms concerning for sleep disordered breathing, thus meriting evaluation for and treatment of this if present.    Discussed with patient the pathophysiology of OSAS and medical conditions associated with OSAS such as DM, HTN, CAD, Depression, Stroke, Headache.  Treatment options including surgery, Dental appliances, positional therapy, and CPAP were discussed.  I have ordered a Home Sleep Apnea Test (HSAT) to evaluate for sleep-disordered breathing. Should this be negative/inconclusive, due to the known limitations of HSAT, I will order an in lab polysomnogram (PSG) to ensure that sleep apnea is not present.  Advised patient to avoid activities that could harm self or others when tired/sleepy, including driving.  Discussed importance of good sleep hygiene.  I will reach out to the patient via MyChart with the results of the sleep study and next steps.    Orders:    Ambulatory referral to Sleep Medicine    Home Study; Future    Class 3 severe obesity with body mass index (BMI) of 50.0 to 59.9 in adult, unspecified obesity type, unspecified whether serious comorbidity present (HCC)      Discussed the importance of maintaining a healthy weight on SDB control/management, and vice versa.  Encouraged lifestyle practices to maintain a healthy weight (including diet, exercise).  Continue to follow with Weight Management per their recommendations    Orders:    Home Study; Future    Frequent nocturnal awakening  See snoring  Orders:    Home Study;  "Future    Other migraine without status migrainosus, not intractable  See snoring  Orders:    Home Study; Future            Follow-up:  She will Return for Follow-up pending results of sleep study..    Thank you for allowing me to participate in the care of your patient.  If there are any questions regarding evaluation please feel free to reach out.       ________________________________________________________________________________________________    Subjective:     HPI: Andreia Lee is a 35 y.o. female with PMHx of migraine, HTN, PCOS, ADAM, obesity, HLD who presents for severe snoring.    She tells me that she has had migraines for a very long time, which have worsened in frequency and severity within the past 6 months. 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    Currently working with Weight Management.       Pertinent Medications:   -Elavil 50 mg nightly  -Fioricet as needed      SLEEP-WAKE SCHEDULE  Sleep Schedule:  Work Days:  Bedtime: 2230   Asleep in 16-30 minutes. 1-2x/week may take longer than 30 minutes.  Nighttime awakenings: 3-4; endorses being a very light sleeper, tosses and turns    Wake: 0700    Naps: \"I try not to\"    Average total sleep time (in a 24 hour period): 6-7 hours.    Days Off (Wed, Sat):  Same    Naps: Sometimes    Average total sleep time (in a 24 hour period): 6-8 hours.    Sleep-Related Details:  Preferred Sleep Position: prone  SDB Symptoms: snoring, multiple awakenings, morning headaches, and waking unrefreshed  Bruxism: No  Nocturnal GERD: No  Nocturnal Palpitations: No  Sleep-Related Hallucinations: No   Sleep Paralysis: No       RLS Symptoms: Denies      Parasomnias:  She denies any parasomnia activity.    Wake-Related Details:  Daytime Sleepiness: No " "  Work Schedule: First Shift  Drowsy Driving: No  Caffeine: Yes, 3 drinks/week  Alcohol Use: Yes, 1x/month  Substance Use: Yes, medical marijuana card (not using much currently)  Tobacco Use: No        PAST TREATMENTS:  -None     PRIOR SLEEP STUDIES:  \"Forever ago,\" remembers nothing else about it    OTHER RELEVANT LABS AND STUDIES:  -None    Sitting and reading: (Patient-Rptd) Slight chance of dozing  Watching TV: (Patient-Rptd) Slight chance of dozing  Sitting, inactive in a public place (e.g. a theatre or a meeting): (Patient-Rptd) Would never doze  As a passenger in a car for an hour without a break: (Patient-Rptd) Slight chance of dozing  Lying down to rest in the afternoon when circumstances permit: (Patient-Rptd) Moderate chance of dozing  Sitting and talking to someone: (Patient-Rptd) Would never doze  Sitting quietly after a lunch without alcohol: (Patient-Rptd) Would never doze  In a car, while stopped for a few minutes in traffic: (Patient-Rptd) Would never doze  Total score: (Patient-Rptd) 5     Review of Systems  Pertinent positives/negatives included in HPI and also as noted:     Current Outpatient Medications on File Prior to Visit   Medication Sig Dispense Refill    amitriptyline (ELAVIL) 50 mg tablet Take 1 tablet (50 mg total) by mouth daily at bedtime 90 tablet 3    B COMPLEX, FOLIC ACID, PO One tablet daily      Biotin 00711 MCG TABS       butalbital-acetaminophen-caffeine (Esgic) -40 mg per tablet Take 1 tablet by mouth every 4 (four) hours as needed for headaches 30 tablet 1    Coenzyme Q10 (Co Q-10) 200 MG CAPS       Cyanocobalamin (Vitamin B 12) 500 MCG TABS One daily      Magnesium Oxide -Mg Supplement ([employer] Magnesium) 500 MG TABS       metFORMIN (GLUCOPHAGE) 500 mg tablet Take 1 tablet (500 mg total) by mouth 2 (two) times a day with meals 180 tablet 1    rimegepant sulfate (Nurtec) 75 mg TBDP Place one NURTEC 75 mg under tongue at onset of headche. Limit 1 in 24 hours. 8 tablet 3    " "rimegepant sulfate (NURTEC) 75 mg TBDP Take 1 tablet (75 mg total) by mouth every other day 15 tablet 2     No current facility-administered medications on file prior to visit.      Objective   /90 (BP Location: Left arm, Patient Position: Sitting, Cuff Size: Large)   Pulse 101   Temp 98.1 °F (36.7 °C) (Temporal)   Resp 16   Ht 5' 3.75\" (1.619 m)   Wt (!) 140 kg (308 lb)   LMP 12/14/2021 (Approximate)   SpO2 96%   BMI 53.28 kg/m²     Neck Circumference: 14.5  Physical Exam  PHYSICAL EXAMINATION:  Vital Signs:  /90 (BP Location: Left arm, Patient Position: Sitting, Cuff Size: Large)   Pulse 101   Temp 98.1 °F (36.7 °C) (Temporal)   Resp 16   Ht 5' 3.75\" (1.619 m)   Wt (!) 140 kg (308 lb)   LMP 12/14/2021 (Approximate)   SpO2 96%   BMI 53.28 kg/m²  Body mass index is 53.28 kg/m².    Constitutional: NAD, well appearing   Mental Status: AAOx3  Neck Circumference: Neck Circumference: 14.5 inches  Skin: Warm, dry, no rashes noted   Eyes: PERRL, normal conjunctiva  ENT: Nasal congestion absent  Posterior Airspace:   Nieves Tongue Position: 4  Retrognathia: absent  Overbite: absent  High Arched Palate: absent  Tongue Scalloping/Ridging: present  Uvula:  Not visualized  Chest: No evidence of respiratory distress, no accessory muscle use; no evidence of peripheral cyanosis  Abdomen: Soft, NT/ND  Extremities: No digital clubbing or pedal edema    NEUROLOGICAL EXAM:  General: Awake, alert, speech fluent, comprehension, naming, repetition intact. Short and long term memory intact.  CN: PERRL, EOMI without nystagmus, facial sensation and strength are normal and symmetric, hearing is intact to conversational tone, palate and tongue movements are intact and symmetric.  Motor: Normal tone, bulk and strength bilaterally.   Sensation: LT intact throughout.  Gait: Able to walk without difficulty. Stance normal.    Data  Lab Results   Component Value Date    HGB 13.5 07/12/2024    HCT 42.7 07/12/2024    MCV " 85 07/12/2024      Lab Results   Component Value Date    GLUCOSE 85 06/03/2015    CALCIUM 9.3 07/12/2024     06/03/2015    K 3.8 07/12/2024    CO2 27 07/12/2024     07/12/2024    BUN 12 07/12/2024    CREATININE 0.68 07/12/2024     Lab Results   Component Value Date    IRON 69 05/17/2021    TIBC 301 05/17/2021    FERRITIN 83 05/17/2021     Lab Results   Component Value Date    AST 17 07/12/2024    ALT 36 07/12/2024           Electronically signed by:    Freddy Bustamante DO  Board-Certified Neurology and Sleep Medicine  [facility]  04/03/25

## 2025-04-03 NOTE — PATIENT INSTRUCTIONS
TERESA Evaluation:    I suspect you may have sleep apnea.  Sleep apnea is a serious sleep disorder that occurs when a person's breathing is interrupted during sleep. People with untreated sleep apnea stop breathing repeatedly during their sleep, sometimes hundreds of times during the night.  The most common type of sleep apnea is obstructive sleep apnea.  If left untreated, obstructive sleep apnea can result in a number of health problems including hypertension, stroke, arrhythmias, cardiomyopathy (enlargement of the muscle tissue of the heart), heart failure, diabetes, obesity, and heart attacks. It has also been found to make chronic medical conditions (such as high blood pressure, migraine headaches, and seizures (more difficult to manage.  Treatment options for obstructive sleep apnea may include positive airway pressure (PAP) therapy, oral appliance, hypoglossal nerve stimulator, nose/throat surgery, weight loss, side sleeping, or avoidance of medications or substances that can relax the airway muscles (alcohol, benzodiazepines, and opioids).  I have ordered a Home Sleep Apnea Test (HSAT) to evaluate for sleep apnea. If this is negative, I will order an in-lab polysomnogram (PSG) as discussed to be sure that sleep apnea is not present.  This test should be scheduled at your earliest convenience.   Sleep Clinic: This should be scheduled at the  before you leave today.  Avoid driving if drowsy. We recommend that if you are dozing off while driving, that you do not drive until your sleepiness is appropriately treated.  We encourage a healthy lifestyle with adequate sleep (7-9 hours per night), diet and exercise.  Recommend good sleep hygiene, as outlined below    Myself and/or my team will reach out to you via MyChart and/or phone call with the results and next steps.      Good Sleep Hygiene  Wake up at the same time every day, even on the weekends.  Use your bed for sleep and intimacy only.  If you have  been in bed awake for 30 minutes, get up and leave the bedroom. Choose a dull activity not involving a blue screen (TV, computer, handheld devices). Go back to bed when you feel sleepy.  Avoid caffeine, nicotine and alcohol before you go to bed.  Avoid large meals before you go to bed.  Avoid using screens (computers, tablets, smartphones, etc.) for at least 1 hour before bedtime  Exercise regularly, but do not exercise right before you go to bed.  Avoid daytime naps. If you do take a nap, sleep for 20-40 minutes, and not after 2pm.
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
## 2025-04-22 ENCOUNTER — HOSPITAL ENCOUNTER (OUTPATIENT)
Dept: SLEEP CENTER | Facility: HOSPITAL | Age: 35
Discharge: HOME | End: 2025-04-22
Attending: STUDENT IN AN ORGANIZED HEALTH CARE EDUCATION/TRAINING PROGRAM
Payer: COMMERCIAL

## 2025-04-22 ENCOUNTER — CLINICAL SUPPORT (OUTPATIENT)
Dept: BARIATRICS | Facility: CLINIC | Age: 35
End: 2025-04-22

## 2025-04-22 ENCOUNTER — PREP FOR PROCEDURE (OUTPATIENT)
Dept: BARIATRICS | Facility: CLINIC | Age: 35
End: 2025-04-22

## 2025-04-22 VITALS — BODY MASS INDEX: 53.66 KG/M2 | WEIGHT: 293 LBS

## 2025-04-22 DIAGNOSIS — E66.01 MORBID OBESITY (HCC): Primary | ICD-10-CM

## 2025-04-22 DIAGNOSIS — E66.01 MORBID (SEVERE) OBESITY DUE TO EXCESS CALORIES (HCC): ICD-10-CM

## 2025-04-22 DIAGNOSIS — Z01.818 PREOP TESTING: Primary | ICD-10-CM

## 2025-04-22 DIAGNOSIS — E66.813 CLASS 3 SEVERE OBESITY WITH BODY MASS INDEX (BMI) OF 50.0 TO 59.9 IN ADULT, UNSPECIFIED OBESITY TYPE, UNSPECIFIED WHETHER SERIOUS COMORBIDITY PRESENT: ICD-10-CM

## 2025-04-22 DIAGNOSIS — E55.9 VITAMIN D DEFICIENCY: ICD-10-CM

## 2025-04-22 DIAGNOSIS — Z71.89 ENCOUNTER FOR PRE-BARIATRIC SURGERY COUNSELING AND EDUCATION: Primary | ICD-10-CM

## 2025-04-22 DIAGNOSIS — G43.809 OTHER MIGRAINE WITHOUT STATUS MIGRAINOSUS, NOT INTRACTABLE: ICD-10-CM

## 2025-04-22 DIAGNOSIS — R06.83 SNORING: ICD-10-CM

## 2025-04-22 DIAGNOSIS — G47.00 FREQUENT NOCTURNAL AWAKENING: ICD-10-CM

## 2025-04-22 PROCEDURE — G0399 HOME SLEEP TEST/TYPE 3 PORTA: HCPCS

## 2025-04-22 PROCEDURE — RECHECK

## 2025-04-22 NOTE — PROGRESS NOTES
Home Sleep Study Documentation    HOME STUDY DEVICE: Noxturnal no                                           Marcia G3 yes device # 26      Pre-Sleep Home Study:    Set-up and instructions performed by: Carmen    Technician performed demonstration for Patient: yes    Return demonstration performed by Patient: yes    Written instructions provided to Patient: yes    Patient signed consent form: yes        Post-Sleep Home Study:    Additional comments by Patient: None    Home Sleep Study Failed:yes:     Failure reason: N/A    Reported or Detected: N/A    Scored by: JOCELYN Vargas

## 2025-04-22 NOTE — PROGRESS NOTES
"Bariatric Nutrition Assessment- Evaluation Note    Insurance: 4 required monthly weight checks     Type of surgery    Pt interested in the Laparoscopic gastric bypass possible sleeve gastrectomy.   Surgery Date: TBD  Surgeon: Amy Surgeons: Dr. Wing  - Consult on 3/12/2025    Nutrition Assessment   Andreia GINA Lee  35 y.o.  female     LMP 12/14/2021 (Approximate)     Initial Weight at Surgeon Consult: 310#   BMI: 53.6  Height: 5'3.8\"  Eval Weight: 310.2#   BMI: 53.7  Wt with BMI of 25: 145#  Pre-Op Excess Wt: 165#  BMI to Qualify at 40 = 231.5#  BMI to Qualify at 35 = 202.5#  PMH includes:  Obesity. PCOS (med), HTN. Risk for TERESA (SB=5/8)    Pt advised not to gain weight during preop process. Pt encouraged to lose weight via healthy eating and exercise. Pt may follow Liver Shrinking diet 2-4 weeks or more  prior to DOS depending on BMI at time. This diet will promote weight loss.      [place]- St. Jeor Equation:    WSY=5984  Weight Maintenance 2500  Estimated calories for weight loss 2077-6292 ( 1-2# per wk wt loss - sedentary )  Estimated protein needs 66-99 grams (1.0-1.5 gms/kg IBW )   Fluids:  132  oz total ( [place]-Segar method )   Fluid Requirement Calculator  Free Fluid  105  oz free fluid ([place]-Segar method; -20%)    NAFLD Fibrosis Score: -1.86 at 7/12/2024 10:33 AM  Calculated from:  SGOT/AST: 17 U/L at 7/12/2024 10:33 AM  SGPT/ALT: 36 U/L at 7/12/2024 10:33 AM  Serum Albumin: 3.8 g/dL at 7/12/2024 10:33 AM  Platelets: 344 Thousands/uL at 7/12/2024 10:33 AM  Age: 34 years  BMI: 53.4 at 6/26/2024  1:58 PM  Weight (recorded): 139.03 kg at 6/26/2024  1:58 PM  Height: 161.30 cm at 6/26/2024  1:58 PM  Has Diabetes: No      Weight History Reason for WLS: Considering for several years - not able to lose Tired and want to be a better me.  Onset of Obesity: Childhood  Family history of obesity: Yes  Wt Loss Attempts: Counseling with  MD  Exercise  Self Created Diets (Portion Control, Healthy Food " Choices, etc.)  Patient has tried the above for 6 months or more with insufficient weight loss or weight regain, which is why patient has requested to be evaluated for weight loss surgery today  Maximum Wt Lost: 20#      Review of History and Medications   OTC: B Complex, Folate, B12, Biotin, MagOxide, CoEnzyme Q  Past Medical History:   Diagnosis Date    Anemia     Anxiety     Asthma     Depression     Endometriosis     Fractures 2018     RIGHT TIB/FIB, RIBS , GREAT TOE     Hyperlipidemia     Hypertension     Only slightly elevated    Irregular menses     Migraines     Obesity     PCOS (polycystic ovarian syndrome)     Vitamin D deficiency      Past Surgical History:   Procedure Laterality Date    HYSTERECTOMY      KNEE ARTHROSCOPY      ORIF TIBIA & FIBULA FRACTURES Right     SC LAPS TOTAL HYSTERECT 250 GM/< W/RMVL TUBE/OVARY N/A 1/18/2022    Procedure: HYSTERECTOMY LAPAROSCOPIC TOTAL (LTH) W/ ROBOTICS, BILATERAL SALPINGECTOMY;  Surgeon: Aracely Carlos MD;  Location: [facility];  Service: Gynecology    WISDOM TOOTH EXTRACTION       Social History     Socioeconomic History    Marital status: Single     Spouse name: Not on file    Number of children: Not on file    Years of education: Not on file    Highest education level: Not on file   Occupational History    Occupation: Asst Manager   Tobacco Use    Smoking status: Never     Passive exposure: Never    Smokeless tobacco: Never   Vaping Use    Vaping status: Former    Substances: THC, CBD   Substance and Sexual Activity    Alcohol use: Yes     Comment: Drink very infrequently-maybe once a month    Drug use: Yes     Frequency: 3.0 times per week     Types: Marijuana     Comment: Use tintures for migraines    Sexual activity: Not Currently     Partners: Male     Birth control/protection: None   Other Topics Concern    Not on file   Social History Narrative    Single    Employed - Full Time     Social Drivers of Health     Financial Resource Strain: Not on file    Food Insecurity: Not on file   Transportation Needs: Not on file   Physical Activity: Not on file   Stress: Not on file   Social Connections: Not on file   Intimate Partner Violence: Not on file   Housing Stability: Not on file       Current Outpatient Medications:     amitriptyline (ELAVIL) 50 mg tablet, Take 1 tablet (50 mg total) by mouth daily at bedtime, Disp: 90 tablet, Rfl: 3    B COMPLEX, FOLIC ACID, PO, One tablet daily, Disp: , Rfl:     Biotin 20352 MCG TABS, , Disp: , Rfl:     butalbital-acetaminophen-caffeine (Esgic) -40 mg per tablet, Take 1 tablet by mouth every 4 (four) hours as needed for headaches, Disp: 30 tablet, Rfl: 1    Coenzyme Q10 (Co Q-10) 200 MG CAPS, , Disp: , Rfl:     Cyanocobalamin (Vitamin B 12) 500 MCG TABS, One daily, Disp: , Rfl:     Magnesium Oxide -Mg Supplement ([employer] Magnesium) 500 MG TABS, , Disp: , Rfl:     metFORMIN (GLUCOPHAGE) 500 mg tablet, Take 1 tablet (500 mg total) by mouth 2 (two) times a day with meals, Disp: 180 tablet, Rfl: 1    rimegepant sulfate (NURTEC) 75 mg TBDP, Take 1 tablet (75 mg total) by mouth every other day, Disp: 15 tablet, Rfl: 2    rimegepant sulfate (Nurtec) 75 mg TBDP, Place one NURTEC 75 mg under tongue at onset of headche. Limit 1 in 24 hours., Disp: 8 tablet, Rfl: 3  Food Intake and Lifestyle Assessment   Food Intake Assessment completed via usual diet recall  Breakfast: String cheeses  Snack: Grazes   Dinner: Chix or steak Starch, vegetable - will eat something quick - not a good cook per pt  Snack: Cheese, spinach with low carb dressing   Portions:  6oz Protein  1 c Starch   1/4 c Vegetable    Beverage intake: water 50 oz jug ( 1.5-2 daily)   No coffee occ milk or iced tea  Protein supplement: No  Estimated protein intake per day: 70-75grams  Estimated fluid intake per day:  oz   Meals eaten away from home: Dines or takes out   Typical meal pattern: 1-2 meals per day and several snacks per day  Eating Behaviors: Consumption of high  "calorie/ high fat foods, Large portion sizes, Frequent snacking/ grazing, Mindless eating, and Emotional eating (improved)  Food allergies or intolerances: Unsure - has stomach issues  Allergies   Allergen Reactions    Penicillins Anaphylaxis    Sulfamethoxazole-Trimethoprim Rash     Cultural or Shinto considerations: None    Physical Assessment  Physical Activity  On feet at work   Types of exercise: Walking or swims in summer - otherwise no formal   Current physical limitations: None    Psychosocial Assessment   Support systems: parent(s) sister lives nearby with her nieces and nephew, Friends  Socioeconomic factors: Works at [employer]  Has Degree in English and Fine Arts    Nutrition Diagnosis  Diagnosis: Overweight / Obesity (NC-3.3)  Related to: Physical inactivity and Excessive energy intake  As Evidenced by: BMI >25     Nutrition Prescription: Recommend the following diet  Regular    Interventions and Teaching   Discussed pre-op and post-op nutrition guidelines.       Patient educated and handouts provided.  Surgical changes to stomach / GI  Capacity of post-surgery stomach  Diet progression  Adequate hydration  Sugar and fat restriction to decrease \"dumping syndrome\"  Fat restriction to decrease steatorrhea  Expected weight loss  Weight loss plateaus/ possibility of weight regain  Exercise  Suggestions for pre-op diet  Nutrition considerations after surgery  Protein supplements  Meal planning and preparation  Appropriate carbohydrate, protein, and fat intake, and food/fluid choices to maximize safe weight loss, nutrient intake, and tolerance   Dietary and lifestyle changes  Possible problems with poor eating habits  Intuitive eating  Techniques for self monitoring and keeping daily food journal  Potential for food intolerance after surgery, and ways to deal with them including: lactose intolerance, nausea, reflux, vomiting, diarrhea, food intolerance, appetite changes, gas  Vitamin / Mineral " supplementation of Multivitamin with minerals, Calcium, Vitamin B12, Iron, Fat Soluble vitamins, and Vitamin D    Patient is not currently pregnant and doesn't desire to become pregnant a minimum of one year post-op    Education provided to: patient    Barriers to learning: No barriers identified    Readiness to change: preparation    Prior research on procedure: internet, discussed with provider, and friends or family    Comprehension: verbalizes understanding     Expected Compliance: good  Recommendations  Pt is an appropriate candidate for surgery. Yes    Evaluation / Monitoring  Dietitian to Monitor: Eating pattern as discussed Tolerance of nutrition prescription Body weight Lab values Physical activity Bowel pattern    Goals  Eliminate sugar sweetened beverages, Food journal, Exercise 30 minutes 5 times per week, Complete lession plans 1-6, Eat 3 meals per day, and Eliminate mindless snacking  Follow Pre-Surgery guidelines  > Trial Baritastic for food logging  > Establish regular meal pattern - include fruits, vegetables and whole grains  > Avoid skipping meals- Can use protein drink as meal replacement  > Decrease portions  > Focus on protein - include lean protein at each meal and snack - Learn to eat protein first  > Limit processed foodsand dining away from home  > Include meal prepping  > Limit snacks - healthier choices and portion; avoid grazing  > Slow pace of eating and sip fluids  - practice 30/60 minute rule  >Continue to avoid  caffeine and carbonation  > Maintain  water intake > 64 oz  > Increase physical activity and exercise as able  > Continue current vitamin regimen - may need to add Vitamin D Work on skills to cope with emotional eating/mindfull eating  Pre-op weight loss not required but advised not to gain weight  Glycemic Control: HgA1c 5.5 on 7/12/2024  Other Vitamin D Insufficiency 7/12/2024 - will recheck  F/U next month with bariatric provider      Time Spent:   1 Hour

## 2025-04-25 ENCOUNTER — OFFICE VISIT (OUTPATIENT)
Dept: NEUROLOGY | Facility: CLINIC | Age: 35
End: 2025-04-25
Payer: COMMERCIAL

## 2025-04-25 VITALS
SYSTOLIC BLOOD PRESSURE: 135 MMHG | BODY MASS INDEX: 50.02 KG/M2 | DIASTOLIC BLOOD PRESSURE: 86 MMHG | RESPIRATION RATE: 16 BRPM | HEIGHT: 64 IN | TEMPERATURE: 98.1 F | HEART RATE: 98 BPM | WEIGHT: 293 LBS | OXYGEN SATURATION: 98 %

## 2025-04-25 DIAGNOSIS — G43.809 OTHER MIGRAINE WITHOUT STATUS MIGRAINOSUS, NOT INTRACTABLE: Primary | ICD-10-CM

## 2025-04-25 PROBLEM — G47.33 OSA (OBSTRUCTIVE SLEEP APNEA): Status: ACTIVE | Noted: 2025-04-25

## 2025-04-25 PROCEDURE — 99213 OFFICE O/P EST LOW 20 MIN: CPT | Performed by: NURSE PRACTITIONER

## 2025-04-25 RX ORDER — TOPIRAMATE 50 MG/1
50 TABLET, FILM COATED ORAL
Qty: 30 TABLET | Refills: 11 | Status: SHIPPED | OUTPATIENT
Start: 2025-04-25
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
## 2025-04-25 NOTE — ASSESSMENT & PLAN NOTE
35 y.o. female, with HTN, HLD, obesity, PCOS, and anxiety who is presenting to [facility] for follow-up of her migraine headaches. MRI brain showed white matter changes consistent with history of migraine. Neurological exam is normal.     Diagnosis: Migraine headache, More severe headaches have improved in frequency. However, headaches continue to occur from 5-14 times a month.     Plan:  Prevention:  Continue Magnesium oxide supplement 400mg daily, B 12, and Co q10 supplements  Continue amitriptyline 50 mg at bedtime.   Add topiramate 50mg HS  Patient is not interested in injectable CGRP medications or Botox injections at this time.    Abortive:  Patient received Toradol injection during the office visit today  She was cautioned to not take NSAIDS for the next 3 days due to receiving the Toradol injection  Take Nurtec at onset of headache   Fioricet as needed, recommend to take Fioricet/Excedrin and Nurtec at onset of headache  Patient was cautioned to note take NSAIDS (Tylenol, Ibuprofen, Excedrin) more than 3 times a week as this can lead to rebound headaches.    Lifestyle modifications were also discussed, including drinking 48-64 oz of water daily, eating regular meals, getting adequate sleep, and regular exercise.     Follow up: on June 5th to coincide with sleep appointment

## 2025-04-25 NOTE — PATIENT INSTRUCTIONS
You were given a Toradol injection during the office visit today. Do not take other NSAIDS for the next 3 days.    For prevention  Continue amitriptyline 50mg at bedtime   Add topiramate 50mg at bedtime  Continue Magnesium, B12, Co q10     For abortive treatment   Take Nurtec and Fiorecet or Excedrin at onset of headache.

## 2025-04-27 ENCOUNTER — RESULTS FOLLOW-UP (OUTPATIENT)
Dept: SLEEP CENTER | Facility: CLINIC | Age: 35
End: 2025-04-27

## 2025-04-27 DIAGNOSIS — G47.33 OSA (OBSTRUCTIVE SLEEP APNEA): Primary | ICD-10-CM

## 2025-04-27 DIAGNOSIS — R06.83 SNORING: ICD-10-CM

## 2025-04-27 DIAGNOSIS — G43.809 OTHER MIGRAINE WITHOUT STATUS MIGRAINOSUS, NOT INTRACTABLE: ICD-10-CM

## 2025-04-27 DIAGNOSIS — E66.813 CLASS 3 SEVERE OBESITY WITH BODY MASS INDEX (BMI) OF 50.0 TO 59.9 IN ADULT, UNSPECIFIED OBESITY TYPE, UNSPECIFIED WHETHER SERIOUS COMORBIDITY PRESENT: ICD-10-CM

## 2025-04-27 DIAGNOSIS — G47.00 FREQUENT NOCTURNAL AWAKENING: ICD-10-CM

## 2025-04-27 PROCEDURE — G0399 HOME SLEEP TEST/TYPE 3 PORTA: HCPCS | Performed by: STUDENT IN AN ORGANIZED HEALTH CARE EDUCATION/TRAINING PROGRAM

## 2025-05-13 NOTE — TELEPHONE ENCOUNTER
Sleep study resulted and shows mild sleep apnea.     Patient read results and message from Dr. Bustamante on 5/2/2025.     [employer] message to follow up if patient has made a decision with how she wants to proceed in treating her sleep apnea.  Phone number given, [phone].

## 2025-05-14 ENCOUNTER — CLINICAL SUPPORT (OUTPATIENT)
Dept: BARIATRICS | Facility: CLINIC | Age: 35
End: 2025-05-14

## 2025-05-14 VITALS — WEIGHT: 293 LBS | BODY MASS INDEX: 52 KG/M2

## 2025-05-14 DIAGNOSIS — Z71.89 ENCOUNTER FOR PRE-BARIATRIC SURGERY COUNSELING AND EDUCATION: Primary | ICD-10-CM

## 2025-05-14 PROCEDURE — RECHECK

## 2025-05-14 NOTE — PROGRESS NOTES
3/4 weight check. Her parents went away for a few days to [place]. Has been taking care of the house and the pets while they are gone. Doing Meals on Wheels for her mom. Lost almost 10 lbs since her eval. Has been having 3 meals per day and has been food logging. Has stopped eating late at night. Still drinking  oz of water, significantly reduced milk to 2x in the last month. Has reduced snacking and will make a healthier choice.   Workflow reviewed:  Psych and/or D+A Clearance: N/A  PCP Letter: Referral in her chart  Support Group: No longer required, strongly encouraged  Surgeon Appt: 3/12/25  EGD: scheduled 5/23/25  Cardiac Risk Assessment: scheduled 6/6/25  Sleep Studies: home sleep study scheduled for 4/22- dx mild TERESA, ordered CPAP 4/27, but hasn't received yet- has follow up 6/5  Blood work: Needs to complete  Nicotine test: N/A  Weight loss meds: N/A  Required weight checks: 4 months required  Weight Loss: Not required, encouraged positive lifestyle changes.  Darline Ziegler LCSW

## 2025-05-16 ENCOUNTER — APPOINTMENT (OUTPATIENT)
Dept: LAB | Facility: CLINIC | Age: 35
End: 2025-05-16
Payer: COMMERCIAL

## 2025-05-16 DIAGNOSIS — E66.01 MORBID (SEVERE) OBESITY DUE TO EXCESS CALORIES (HCC): ICD-10-CM

## 2025-05-16 DIAGNOSIS — E55.9 VITAMIN D DEFICIENCY: ICD-10-CM

## 2025-05-16 DIAGNOSIS — Z01.818 PREOP TESTING: ICD-10-CM

## 2025-05-16 LAB
ALBUMIN SERPL BCG-MCNC: 4.1 G/DL (ref 3.5–5)
ALP SERPL-CCNC: 87 U/L (ref 34–104)
ALT SERPL W P-5'-P-CCNC: 26 U/L (ref 7–52)
ANION GAP SERPL CALCULATED.3IONS-SCNC: 11 MMOL/L (ref 4–13)
AST SERPL W P-5'-P-CCNC: 17 U/L (ref 13–39)
BASOPHILS # BLD AUTO: 0.06 THOUSANDS/ÂΜL (ref 0–0.1)
BASOPHILS NFR BLD AUTO: 1 % (ref 0–1)
BILIRUB SERPL-MCNC: 0.38 MG/DL (ref 0.2–1)
BUN SERPL-MCNC: 17 MG/DL (ref 5–25)
CALCIUM SERPL-MCNC: 9.1 MG/DL (ref 8.4–10.2)
CHLORIDE SERPL-SCNC: 105 MMOL/L (ref 96–108)
CHOLEST SERPL-MCNC: 235 MG/DL (ref ?–200)
CO2 SERPL-SCNC: 23 MMOL/L (ref 21–32)
CREAT SERPL-MCNC: 0.69 MG/DL (ref 0.6–1.3)
EOSINOPHIL # BLD AUTO: 0.1 THOUSAND/ÂΜL (ref 0–0.61)
EOSINOPHIL NFR BLD AUTO: 1 % (ref 0–6)
ERYTHROCYTE [DISTWIDTH] IN BLOOD BY AUTOMATED COUNT: 15.6 % (ref 11.6–15.1)
EST. AVERAGE GLUCOSE BLD GHB EST-MCNC: 123 MG/DL
GFR SERPL CREATININE-BSD FRML MDRD: 112 ML/MIN/1.73SQ M
GLUCOSE P FAST SERPL-MCNC: 90 MG/DL (ref 65–99)
HBA1C MFR BLD: 5.9 %
HCT VFR BLD AUTO: 41.6 % (ref 34.8–46.1)
HDLC SERPL-MCNC: 48 MG/DL
HGB BLD-MCNC: 13.4 G/DL (ref 11.5–15.4)
IMM GRANULOCYTES # BLD AUTO: 0.05 THOUSAND/UL (ref 0–0.2)
IMM GRANULOCYTES NFR BLD AUTO: 1 % (ref 0–2)
LDLC SERPL CALC-MCNC: 131 MG/DL (ref 0–100)
LYMPHOCYTES # BLD AUTO: 2.87 THOUSANDS/ÂΜL (ref 0.6–4.47)
LYMPHOCYTES NFR BLD AUTO: 27 % (ref 14–44)
MCH RBC QN AUTO: 26.9 PG (ref 26.8–34.3)
MCHC RBC AUTO-ENTMCNC: 32.2 G/DL (ref 31.4–37.4)
MCV RBC AUTO: 84 FL (ref 82–98)
MONOCYTES # BLD AUTO: 0.62 THOUSAND/ÂΜL (ref 0.17–1.22)
MONOCYTES NFR BLD AUTO: 6 % (ref 4–12)
NEUTROPHILS # BLD AUTO: 6.86 THOUSANDS/ÂΜL (ref 1.85–7.62)
NEUTS SEG NFR BLD AUTO: 64 % (ref 43–75)
NONHDLC SERPL-MCNC: 187 MG/DL
NRBC BLD AUTO-RTO: 0 /100 WBCS
PLATELET # BLD AUTO: 367 THOUSANDS/UL (ref 149–390)
PMV BLD AUTO: 12.1 FL (ref 8.9–12.7)
POTASSIUM SERPL-SCNC: 3.9 MMOL/L (ref 3.5–5.3)
PROT SERPL-MCNC: 7.6 G/DL (ref 6.4–8.4)
RBC # BLD AUTO: 4.98 MILLION/UL (ref 3.81–5.12)
SODIUM SERPL-SCNC: 139 MMOL/L (ref 135–147)
TRIGL SERPL-MCNC: 278 MG/DL (ref ?–150)
TSH SERPL DL<=0.05 MIU/L-ACNC: 2.95 UIU/ML (ref 0.45–4.5)
WBC # BLD AUTO: 10.56 THOUSAND/UL (ref 4.31–10.16)

## 2025-05-16 PROCEDURE — 82652 VIT D 1 25-DIHYDROXY: CPT

## 2025-05-16 PROCEDURE — 80061 LIPID PANEL: CPT

## 2025-05-16 PROCEDURE — 84443 ASSAY THYROID STIM HORMONE: CPT

## 2025-05-16 PROCEDURE — 80053 COMPREHEN METABOLIC PANEL: CPT

## 2025-05-16 PROCEDURE — 85025 COMPLETE CBC W/AUTO DIFF WBC: CPT

## 2025-05-16 PROCEDURE — 36415 COLL VENOUS BLD VENIPUNCTURE: CPT

## 2025-05-16 PROCEDURE — 83036 HEMOGLOBIN GLYCOSYLATED A1C: CPT

## 2025-05-17 ENCOUNTER — RESULTS FOLLOW-UP (OUTPATIENT)
Dept: OTHER | Facility: HOSPITAL | Age: 35
End: 2025-05-17

## 2025-05-19 LAB — 1,25(OH)2D SERPL-MCNC: 31.8 PG/ML (ref 5–200)

## 2025-05-23 ENCOUNTER — HOSPITAL ENCOUNTER (OUTPATIENT)
Dept: GASTROENTEROLOGY | Facility: HOSPITAL | Age: 35
Setting detail: OUTPATIENT SURGERY
Discharge: HOME | End: 2025-05-23
Attending: SURGERY
Payer: COMMERCIAL

## 2025-05-23 ENCOUNTER — ANESTHESIA (OUTPATIENT)
Dept: GASTROENTEROLOGY | Facility: HOSPITAL | Age: 35
End: 2025-05-23
Payer: COMMERCIAL

## 2025-05-23 ENCOUNTER — ANESTHESIA EVENT (OUTPATIENT)
Dept: GASTROENTEROLOGY | Facility: HOSPITAL | Age: 35
End: 2025-05-23
Payer: COMMERCIAL

## 2025-05-23 VITALS
HEART RATE: 86 BPM | DIASTOLIC BLOOD PRESSURE: 89 MMHG | TEMPERATURE: 98 F | WEIGHT: 293 LBS | SYSTOLIC BLOOD PRESSURE: 126 MMHG | HEIGHT: 63 IN | BODY MASS INDEX: 51.91 KG/M2 | OXYGEN SATURATION: 96 % | RESPIRATION RATE: 22 BRPM

## 2025-05-23 DIAGNOSIS — E66.01 MORBID OBESITY (HCC): ICD-10-CM

## 2025-05-23 PROCEDURE — 43239 EGD BIOPSY SINGLE/MULTIPLE: CPT | Performed by: SURGERY

## 2025-05-23 PROCEDURE — 88305 TISSUE EXAM BY PATHOLOGIST: CPT | Performed by: PATHOLOGY

## 2025-05-23 RX ORDER — PROPOFOL 10 MG/ML
INJECTION, EMULSION INTRAVENOUS AS NEEDED
Status: DISCONTINUED | OUTPATIENT
Start: 2025-05-23 | End: 2025-05-23

## 2025-05-23 RX ORDER — SODIUM CHLORIDE, SODIUM LACTATE, POTASSIUM CHLORIDE, CALCIUM CHLORIDE 600; 310; 30; 20 MG/100ML; MG/100ML; MG/100ML; MG/100ML
75 INJECTION, SOLUTION INTRAVENOUS CONTINUOUS
Status: DISCONTINUED | OUTPATIENT
Start: 2025-05-23 | End: 2025-05-27 | Stop reason: HOSPADM

## 2025-05-23 RX ORDER — LIDOCAINE HYDROCHLORIDE 20 MG/ML
INJECTION, SOLUTION EPIDURAL; INFILTRATION; INTRACAUDAL; PERINEURAL AS NEEDED
Status: DISCONTINUED | OUTPATIENT
Start: 2025-05-23 | End: 2025-05-23

## 2025-05-23 RX ORDER — SODIUM CHLORIDE, SODIUM LACTATE, POTASSIUM CHLORIDE, CALCIUM CHLORIDE 600; 310; 30; 20 MG/100ML; MG/100ML; MG/100ML; MG/100ML
INJECTION, SOLUTION INTRAVENOUS CONTINUOUS PRN
Status: DISCONTINUED | OUTPATIENT
Start: 2025-05-23 | End: 2025-05-23

## 2025-05-23 RX ADMIN — SODIUM CHLORIDE, SODIUM LACTATE, POTASSIUM CHLORIDE, AND CALCIUM CHLORIDE: .6; .31; .03; .02 INJECTION, SOLUTION INTRAVENOUS at 07:09

## 2025-05-23 RX ADMIN — PROPOFOL 50 MG: 10 INJECTION, EMULSION INTRAVENOUS at 07:51

## 2025-05-23 RX ADMIN — PROPOFOL 150 MG: 10 INJECTION, EMULSION INTRAVENOUS at 07:50

## 2025-05-23 RX ADMIN — LIDOCAINE HYDROCHLORIDE 50 MG: 20 INJECTION, SOLUTION EPIDURAL; INFILTRATION; INTRACAUDAL; PERINEURAL at 07:50

## 2025-05-23 NOTE — ANESTHESIA PREPROCEDURE EVALUATION
Procedure:  EGD    Relevant Problems   CARDIO   (+) Combined hyperlipidemia   (+) Hypertension   (+) Migraine   (+) Other migraine without status migrainosus, not intractable      MUSCULOSKELETAL   (+) Exercise-induced asthma      NEURO/PSYCH   (+) ADAM (generalized anxiety disorder)   (+) Migraine   (+) Other migraine without status migrainosus, not intractable      PULMONARY   (+) TERESA (obstructive sleep apnea)      Obesity    Anemia    Hyperlipidemia    Migraines    Vitamin D deficiency    Irregular menses    Fractures  RIGHT TIB/FIB, RIBS , GREAT TOE   Depression    PCOS (polycystic ovarian syndrome)    Endometriosis    Asthma    Anxiety    Hypertension Only slightly elevated     Pertinent Negatives Comments   History of transfusion      Super morbid obesity    Physical Exam    Airway     Mallampati score: III  TM Distance: >3 FB  Neck ROM: full      Cardiovascular  Cardiovascular exam normal    Dental       Pulmonary  Pulmonary exam normal     Neurological      Other Findings  post-pubertal.      Anesthesia Plan  ASA Score- 3     Anesthesia Type- IV sedation with anesthesia with ASA Monitors.         Additional Monitors:     Airway Plan:            Plan Factors-Exercise tolerance (METS): >4 METS.    Chart reviewed. EKG reviewed. Imaging results reviewed. Existing labs reviewed. Patient summary reviewed.                  Induction- intravenous.    Postoperative Plan- .   Monitoring Plan - Monitoring plan - standard ASA monitoring          Informed Consent- Anesthetic plan and risks discussed with patient.  I personally reviewed this patient with the CRNA. Discussed and agreed on the Anesthesia Plan with the CRNA..      NPO Status:  No vitals data found for the desired time range.

## 2025-05-23 NOTE — H&P
"This is a 35 y.o. female with a history of morbid obesity and Body mass index is 52.99 kg/m².  Here for an EGD to evaluate the anatomy of the GI tract and to rule out the presence of H. pylori.    Physical Exam    /74   Pulse 94   Temp 97.5 °F (36.4 °C) (Temporal)   Resp 18   Ht 5' 3\" (1.6 m)   Wt 136 kg (299 lb 2.6 oz)   LMP 12/14/2021 (Approximate)   SpO2 97%   BMI 52.99 kg/m²    AAOx3  RRR  CTA B  Abdomen obese. Benign.  Extremities warm and dry       A/P:    This is a 35 y.o. female with a history of morbid obesity and Body mass index is 52.99 kg/m²..    Will proceed with the EGD and biopsies.      Leroy Tolentino MD  5/23/2025  7:48 AM         "

## 2025-05-23 NOTE — ANESTHESIA POSTPROCEDURE EVALUATION
Post-Op Assessment Note    CV Status:  Stable  Pain Score: 0    Pain management: adequate       Mental Status:  Sleepy   Hydration Status:  Stable   PONV Controlled:  None   Airway Patency:  Patent     Post Op Vitals Reviewed: Yes    No anethesia notable event occurred.    Staff: CRNA           Last Filed PACU Vitals:  Vitals Value Taken Time   Temp 98    Pulse 90    /75    Resp 16    SpO2 98

## 2025-05-29 ENCOUNTER — RESULTS FOLLOW-UP (OUTPATIENT)
Dept: BARIATRICS | Facility: CLINIC | Age: 35
End: 2025-05-29

## 2025-05-29 PROCEDURE — 88305 TISSUE EXAM BY PATHOLOGIST: CPT | Performed by: PATHOLOGY

## 2025-05-29 NOTE — TELEPHONE ENCOUNTER
----- Message from Chichi Jennings PA-C sent at 5/29/2025  2:15 PM EDT -----  Pathology normal - continue pre-op process thank you  ----- Message -----  From: Lab, Background User  Sent: 5/29/2025   9:51 AM EDT  To: Leroy Tolentino MD

## 2025-05-30 ENCOUNTER — TELEPHONE (OUTPATIENT)
Dept: NEUROLOGY | Facility: CLINIC | Age: 35
End: 2025-05-30

## 2025-06-05 ENCOUNTER — OFFICE VISIT (OUTPATIENT)
Dept: NEUROLOGY | Facility: CLINIC | Age: 35
End: 2025-06-05
Payer: COMMERCIAL

## 2025-06-05 ENCOUNTER — OFFICE VISIT (OUTPATIENT)
Age: 35
End: 2025-06-05
Payer: COMMERCIAL

## 2025-06-05 VITALS
OXYGEN SATURATION: 98 % | DIASTOLIC BLOOD PRESSURE: 84 MMHG | HEIGHT: 63 IN | RESPIRATION RATE: 16 BRPM | HEART RATE: 101 BPM | BODY MASS INDEX: 51.91 KG/M2 | WEIGHT: 293 LBS | SYSTOLIC BLOOD PRESSURE: 111 MMHG | TEMPERATURE: 98.3 F

## 2025-06-05 VITALS
HEART RATE: 96 BPM | SYSTOLIC BLOOD PRESSURE: 118 MMHG | BODY MASS INDEX: 51.91 KG/M2 | HEIGHT: 63 IN | OXYGEN SATURATION: 96 % | WEIGHT: 293 LBS | DIASTOLIC BLOOD PRESSURE: 82 MMHG | TEMPERATURE: 98.2 F | RESPIRATION RATE: 16 BRPM

## 2025-06-05 DIAGNOSIS — G43.809 OTHER MIGRAINE WITHOUT STATUS MIGRAINOSUS, NOT INTRACTABLE: Primary | ICD-10-CM

## 2025-06-05 DIAGNOSIS — E66.813 CLASS 3 SEVERE OBESITY WITH BODY MASS INDEX (BMI) OF 50.0 TO 59.9 IN ADULT, UNSPECIFIED OBESITY TYPE, UNSPECIFIED WHETHER SERIOUS COMORBIDITY PRESENT: ICD-10-CM

## 2025-06-05 DIAGNOSIS — G47.33 OSA (OBSTRUCTIVE SLEEP APNEA): Primary | ICD-10-CM

## 2025-06-05 DIAGNOSIS — G43.809 OTHER MIGRAINE WITHOUT STATUS MIGRAINOSUS, NOT INTRACTABLE: ICD-10-CM

## 2025-06-05 DIAGNOSIS — G47.00 FREQUENT NOCTURNAL AWAKENING: ICD-10-CM

## 2025-06-05 PROCEDURE — 99214 OFFICE O/P EST MOD 30 MIN: CPT | Performed by: STUDENT IN AN ORGANIZED HEALTH CARE EDUCATION/TRAINING PROGRAM

## 2025-06-05 PROCEDURE — 99213 OFFICE O/P EST LOW 20 MIN: CPT | Performed by: NURSE PRACTITIONER

## 2025-06-05 RX ORDER — TOPIRAMATE 50 MG/1
50 TABLET, FILM COATED ORAL
Qty: 90 TABLET | Refills: 3 | Status: SHIPPED | OUTPATIENT
Start: 2025-06-05
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
## 2025-06-05 NOTE — PROGRESS NOTES
Name: Andriea Lee      : 1990      MRN: [id number]  Encounter Provider: Freddy Bustamante DO  Encounter Date: 2025   Encounter department: [facility] [place]  :  Assessment & Plan  TERESA (obstructive sleep apnea)  Andreia is a very pleasant 35-year-old woman with a PMHx of migraine, HTN, PCOS, ADAM, obesity, HLD who presents in follow up for TERESA (NEPTALI 8.2, O2 dot 86% 2025).    Discussed with patient the pathophysiology of OSAS and medical conditions associated with OSAS such as DM, HTN, CAD, Depression, Stroke, Headache.  Reviewed sleep study results at length with the patient today  Treatment options including surgery, Dental appliances, positional therapy, and CPAP were discussed.  Will start AutoPAP 5-20 cmH2O with a nasal mask..   Advised the patient that she should be receiving a call to schedule with their DME in ~2 weeks to receive the device.  Reviewed CMS/insurance criteria and resupply guidelines  Advised patient to avoid activities that could harm self or others when tired/sleepy, including driving.  Discussed importance of good sleep hygiene.    Orders:    Cpap DME    Class 3 severe obesity with body mass index (BMI) of 50.0 to 59.9 in adult, unspecified obesity type, unspecified whether serious comorbidity present    Discussed the importance of maintaining a healthy weight on SDB control/management, and vice versa.  Encouraged lifestyle practices to maintain a healthy weight (including diet, exercise).  Continue to follow with Weight Management per their recommendations    Orders:    Cpap DME    Frequent nocturnal awakening  See TERESA    Orders:    Cpap DME    Other migraine without status migrainosus, not intractable  See TERESA    Orders:    Cpap DME          Follow-up:  She will Return in about 2 months (around 2025) for Compliance Visit.      ________________________________________________________________________________________________    Per Last Visit Note (Date:  4/3/2025):  Snoring  Andreia is a very pleasant 35-year-old woman with PMHx of migraine, HTN, PCOS, ADAM, obesity, HLD who presents for severe snoring.  She does certainly have several symptoms concerning for sleep disordered breathing, thus meriting evaluation for and treatment of this if present.     Discussed with patient the pathophysiology of OSAS and medical conditions associated with OSAS such as DM, HTN, CAD, Depression, Stroke, Headache.  Treatment options including surgery, Dental appliances, positional therapy, and CPAP were discussed.  I have ordered a Home Sleep Apnea Test (HSAT) to evaluate for sleep-disordered breathing. Should this be negative/inconclusive, due to the known limitations of HSAT, I will order an in lab polysomnogram (PSG) to ensure that sleep apnea is not present.  Advised patient to avoid activities that could harm self or others when tired/sleepy, including driving.  Discussed importance of good sleep hygiene.  I will reach out to the patient via [employer] with the results of the sleep study and next steps.     Orders:    Ambulatory referral to Sleep Medicine    Home Study; Future     Class 3 severe obesity with body mass index (BMI) of 50.0 to 59.9 in adult, unspecified obesity type, unspecified whether serious comorbidity present (HCC)        Discussed the importance of maintaining a healthy weight on SDB control/management, and vice versa.  Encouraged lifestyle practices to maintain a healthy weight (including diet, exercise).  Continue to follow with Weight Management per their recommendations     Orders:    Home Study; Future      Sleep Studies:  -HSAT 4/25/2025: TRT: 474.2 minutes, NEPTALI: 8.2, O2 dto: 86%     ________________________________________________________________________________________________      Interval History: Andreia Lee is a 35 y.o. female with a PMHx of migraine, HTN, PCOS, ADAM, obesity, HLD who presents in follow up for TERESA (NEPTALI 8.2, O2 dot 86%  "4/2025).    SDB:  -Migraines have gotten better after starting Topamax at a higher dose. Still a constant \"ache,\" but \"more manageable.\"   -Still snoring      SLEEP SCHEDULE:  Bedtime: 2230   Time it takes to fall sleep: 16-30 minutes.  Can take longer a few times per week.  Wake up Time: 0700     Estimated total sleep time ( in a 24 hour period of time): ~6-7 hours       Changes to PMH, PSH, SH: See above         Sitting and reading: Would never doze  Watching TV: Would never doze  Sitting, inactive in a public place (e.g. a theatre or a meeting): Would never doze  As a passenger in a car for an hour without a break: Would never doze  Lying down to rest in the afternoon when circumstances permit: Slight chance of dozing  Sitting and talking to someone: Would never doze  Sitting quietly after a lunch without alcohol: Would never doze  In a car, while stopped for a few minutes in traffic: Would never doze  Total score: 1     Review of Systems  Pertinent positives/negatives included in HPI and also as noted:     Medications Ordered Prior to Encounter[1]   Objective   /84 (BP Location: Left arm, Patient Position: Sitting, Cuff Size: Large)   Pulse 101   Temp 98.3 °F (36.8 °C) (Temporal)   Resp 16   Ht 5' 3\" (1.6 m)   Wt 136 kg (299 lb 12.8 oz)   LMP 12/14/2021 (Approximate)   SpO2 98%   BMI 53.11 kg/m²     Neck Circumference: 14.25  Physical Exam  Visit Vitals  /84 (BP Location: Left arm, Patient Position: Sitting, Cuff Size: Large)   Pulse 101   Temp 98.3 °F (36.8 °C) (Temporal)   Resp 16   Ht 5' 3\" (1.6 m)   Wt 136 kg (299 lb 12.8 oz)   LMP 12/14/2021 (Approximate)   SpO2 98%   BMI 53.11 kg/m²   OB Status Hysterectomy   Smoking Status Never   BSA 2.3 m²     PHYSICAL EXAMINATION:  Vital Signs: /84 (BP Location: Left arm, Patient Position: Sitting, Cuff Size: Large)   Pulse 101   Temp 98.3 °F (36.8 °C) (Temporal)   Resp 16   Ht 5' 3\" (1.6 m)   Wt 136 kg (299 lb 12.8 oz)   LMP 12/14/2021 " (Approximate)   SpO2 98%   BMI 53.11 kg/m²     Constitutional: NAD, well appearing   Mental Status: AAOx3  Skin: Warm, dry, no rashes noted   Eyes: PERRL, normal conjunctiva  Chest: No evidence of respiratory distress, no accessory muscle use; no evidence of peripheral cyanosis  Abdomen: Soft, NT/ND  Extremities: No digital clubbing or pedal edema  Neuro: Strength 5/5 throughout, sensation grossly intact      Data  Lab Results   Component Value Date    HGB 13.4 05/16/2025    HCT 41.6 05/16/2025    MCV 84 05/16/2025      Lab Results   Component Value Date    GLUCOSE 85 06/03/2015    CALCIUM 9.1 05/16/2025     06/03/2015    K 3.9 05/16/2025    CO2 23 05/16/2025     05/16/2025    BUN 17 05/16/2025    CREATININE 0.69 05/16/2025     Lab Results   Component Value Date    IRON 69 05/17/2021    TIBC 301 05/17/2021    FERRITIN 83 05/17/2021     Lab Results   Component Value Date    AST 17 05/16/2025    ALT 26 05/16/2025       Administrative Statements   I have spent a total time of 30-33 minutes in caring for this patient on the day of the visit/encounter including Diagnostic results, Prognosis, Risks and benefits of tx options, Instructions for management, Patient and family education, Importance of tx compliance, Risk factor reductions, Documenting in the medical record, Reviewing/placing orders in the medical record (including tests, medications, and/or procedures), and Obtaining or reviewing history  .    Electronically signed by:    Freddy Bustamante DO  Board-Certified Neurology and Sleep Medicine  [facility]  06/05/25       [1]   Current Outpatient Medications on File Prior to Visit   Medication Sig Dispense Refill    amitriptyline (ELAVIL) 50 mg tablet Take 1 tablet (50 mg total) by mouth daily at bedtime 90 tablet 3    B COMPLEX, FOLIC ACID, PO One tablet daily      Biotin 61206 MCG TABS       butalbital-acetaminophen-caffeine (Esgic) -40 mg per tablet Take 1 tablet by  mouth every 4 (four) hours as needed for headaches 30 tablet 1    Coenzyme Q10 (Co Q-10) 200 MG CAPS       Cyanocobalamin (Vitamin B 12) 500 MCG TABS One daily      Magnesium Oxide -Mg Supplement ([employer] Magnesium) 500 MG TABS       metFORMIN (GLUCOPHAGE) 500 mg tablet Take 1 tablet (500 mg total) by mouth 2 (two) times a day with meals 180 tablet 1    rimegepant sulfate (Nurtec) 75 mg TBDP Place one NURTEC 75 mg under tongue at onset of headche. Limit 1 in 24 hours. 8 tablet 3    topiramate (Topamax) 50 MG tablet Take 1 tablet (50 mg total) by mouth daily at bedtime 30 tablet 11     No current facility-administered medications on file prior to visit.

## 2025-06-05 NOTE — PATIENT INSTRUCTIONS
PAP Therapy Initiation  I will place a prescription for AutoPAP 5-20 cmH2O with a nasal mask.  You will ultimately receive your machine and regular supplies from a DME (durable medical equipment) company.   After your visit today, at the  you will discuss the DME options and select your preferred supplier. My prescription will then be sent to the DME of your choice, and they should be reaching out to you within a few weeks to schedule initial device set-up.  If not done after your visit today, please call our [place] office ([phone] option 1, then option 3) to do so.  You should be eligible for new supplies approximately every 3-6 months, depending on your insurance coverage.  If your mask doesn't fit well, call our office to schedule a formal mask fitting.  Insurance requires regular usage and periodic office follow ups for PAP therapy, to continue to cover supplies.  Insurance requires a follow-up in the office within 90 days of starting your new PAP device.  This should have been scheduled today, otherwise can be scheduled when you call the office number listed above.      CMS/Insurance Requirements    Your insurance requires a face-to-face follow up visit within a 31-90 day period after starting CPAP.  Your insurance requires compliance with CPAP, which is at least 4 hours per night for 70% of the time. This must be done over a 30 day period and must occur within the initial 31-90 day period after starting CPAP.  Your insurance also requires at least yearly follow ups to continue to pay for CPAP supplies.    PAP Supply Guidelines    Below are the guidelines for reordering your supplies. You will be responsible for your deductible, co payments, and out of pocket expenses.    Item Frequency   Nasal Mask (no headgear) 1 every 3 months   Nasal Mask Cushion 1 every 2 weeks   Full Face Mask (no headgear) 1 every 3 months   Full Face Mask Cushion 1 every month   Nasal Pillows Cushion 1 every 2 weeks    Headgear 1 every 6 months   hin Strap 1 every 6 months   elaine 1 every 3 months   Filters: Reusable 1 every 6 months   Filters: Disposable 1 every 2 weeks   Humidifier Chamber(disposable) 1 every 6 months       About Your PAP Therapy    Continuous Positive Airway Pressure/Bilevel Therapy  You have been prescribed Positive Airway Pressure (PAP) therapy to treat sleep apnea. The most common type of sleep apnea is obstructive sleep apnea (TERESA), a condition in which the upper airway collapses during sleep. This obstruction keeps air from getting into your lungs. The prescribed PAP machine (CPAP or Bilevel or ASV) will smoothly blow air into your airway to prevent it from closing. The machine will use a mask to deliver the air through your nose and/or mouth. These devices are available in various sizes and styles.    It will take some time for you to get used to the new equipment and it may take a while for you to begin to feel the benefits of PAP therapy. Please be patient. If you are having problems adjusting to your machine, please contact us for help.    Beginning your PAP Therapy  Assembly:  Place your PAP machine on a level surface near your bed.  To prevent injury, do not place the machine higher than your head.  Keep the machine at least 12 inches away from anything that may block the vents.  Plug the machine into a properly grounded electrical outlet. It is better to avoid using an extension cord. If necessary, use a heavy-duty one.  If you are NOT using a humidifier with you PAP equipment:  Attach one end of your 6-foot tubing to the PAP unit outlet and the other end to your mask. (If your mask does not have a built-in exhalation port, a special exhalation valve should be used between the mask and the 6-foot tubing.)  Attach the headgear to your mask.  If you are using a humidifier with your PAP equipment:  Fill the humidifier with DISTILLED water to the maximum fill line.  Attach the humidifier to the PAP  "unit's outlet as instructed by the respiratory therapist with your home care company. Attach one end of your 6-foot tubing to the humidifier outlet and the other end to your mask. (If your mask does not have a built-in exhalation port, a special exhalation valve should be used between the mask and the 6-foot tubing.)  If you have been prescribed oxygen to be used with the PAP machine, you will be instructed on how to attach your oxygen tubing. Always turn off the oxygen tank before turning off your PAP machine.    Getting Started:  Wash your face and apply the mask and headgear as instructed by the sleep technologist or respiratory therapist. The mask should fit snugly to prevent air leaks, but not so tight as to cause skin irritation or discomfort.  Turn the PAP power switch to the ON position. You should feel air blowing through the mask. Breathe normally and adjust the mask gently if you feel an air leak.  If there are no leaks, activate the \"ramp\" feature on your PAP machine. The ramp allows a lower pressure to be delivered for a designated period of time (usually 5 to 45 minutes) to allow you to relax and  fall asleep more easily. The pressure will then gradually increase to the prescribed pressure that controls your apnea.  Remember to turn OFF your PAP unit when it is not in use.    Care and Maintenance    Headgear should be washed as needed. Daily inspection and weekly washings are recommended. Do not disassemble the straps. Machine wash in warm water, making sure to attach Velcro hooks and tabs before washing. Line dry or machine dry on a low setting.  Masks should be washed every day. Daily inspection is recommended. Leave the mask and tubing attached. Gently wash the mask with a soft cloth using warm water and mild detergent, concentrating on the mask cushion flaps. DO NOT use alcohol or bleach. Rinse thoroughly and air dry.  Tubing and headgear should be washed weekly. Daily inspection is recommended. " Wash in warm water and mild detergent and rinse thoroughly. Hook the tubing to the machine and blow until dry.  Humidifier should be washed daily and filled with DISTILLED water before use. Wash with warm water and mild detergent. Rinse thoroughly and air dry.  Disposable filters should be replaced once a month. Wash reusable foam filters with warm water and mild detergent at least once a month. Rinse thoroughly and dry with paper towels.  Avoid  that contain fragrance or conditioners, as these will leave a residue.  NEVER iron any soft goods.    Troubleshooting    If the machine fails to turn on:  Make sure that the PAP machine is plugged into a grounded outlet.  Make sure that the ON/OFF switch is in the ON position.  Make sure that the wall outlet has power.    If there is no pressure coming from your machine:  Make sure that the inlet filter is clean and unblocked.  Make sure that the cooling fan is unblocked and that air is flowing freely.  Make sure that all tubing is securely connected.    If your PAP machine still fails to operate, please call your DME for assistance.    What You Should Know About Insurance    There are many different insurance policies and coverage for PAP equipment will vary. Find out what your coverage provides and what your responsibilities are as a consumer. PAP therapy equipment is considered Durable Medical Equipment (DME). Here are a few questions to ask your insurance provider:  What benefits do I have for DME? Do I have a deductible and/or co pay for DME?  Is there a repair or replacement plan for durable medical equipment?  How often can I receive a replacement mask, tubing, headgear and filters?  Do I have to demonstrate that I am using my PAP device?  o How do I do that?    Important Information    It is very important to keep your PAP equipment and supplies clean. The life of the supplies depends on the care they receive. Under normal circumstances, expect the mask and  headgear to last 9-12 months. Refer to the owner's manual for more information.    Important Safety Reminders    Keep equipment free from obstruction.  Use your PAP equipment as directed.  DO NOT try to adjust your pressure setting.  DO NOT block the exhalation port or valve.  Keep filters clean to prevent overheating.  If a humidifier is being used, place it at a level lower than your head.  If using a heated humidifier, allow unit to cool before cleaning or refilling.  Follow safety guidelines regarding oxygen equipment.DO NOT operate multiple electrical devices from one outlet.  If you have a medical emergency, contact the Emergency Medical Services.

## 2025-06-05 NOTE — ASSESSMENT & PLAN NOTE
35 y.o. female, with HTN, HLD, obesity, PCOS, and anxiety who is presenting to [facility] for follow-up of her migraine headaches. MRI brain showed white matter changes consistent with history of migraine. Neurological exam is normal.     Diagnosis: Migraine headache, headaches have improved with the addition and increase in topiramate dose, only experiencing one bad headache since the last office visit. Will continue current plan of care as below.    Plan:  Prevention:  Continue Magnesium oxide supplement 400mg daily, B 12, and Co q10 supplements  Continue amitriptyline 50 mg at bedtime.   Continue topiramate 50mg HS    Abortive:  Nurtec at onset of headache   Fioricet as needed, recommend to take Fioricet or Excedrin and Nurtec at onset of headache  Patient was cautioned to not take NSAIDS (Tylenol, Ibuprofen, Excedrin) more than 3 times a week as this can lead to rebound headaches.    Continue with Lifestyle modifications  including drinking 48-64 oz of water daily, eating regular meals, getting adequate sleep, and regular exercise.     Patient has also been diagnosed with Sleep Apnea and will be beginning CPAP.  Orders:    topiramate (Topamax) 50 MG tablet; Take 1 tablet (50 mg total) by mouth daily at bedtime

## 2025-06-05 NOTE — PROGRESS NOTES
"  Bariatric Nutrition Assessment- PreOp Note    Insurance: 4 required monthly weight checks 4/4    Type of surgery    Pt interested in the Laparoscopic gastric bypass possible sleeve gastrectomy.   Surgery Date: TBD  Surgeon: Amy Surgeons: Dr. Wing  - Consult on 3/12/2025    Nutrition Assessment   Andreia Lee  35 y.o.  female     LMP 12/14/2021 (Approximate)   Current Weight: 299.2#   Initial Weight at Surgeon Consult: 310#   BMI: 53.6  Height: 5'3.8\"  Eval Weight: 310.2#   BMI: 53.7  Wt with BMI of 25: 145#  Pre-Op Excess Wt: 165#  BMI to Qualify at 34 = 202.5#  PMH includes:  Obesity. PCOS (med), HTN, TERESA mild    Pt advised not to gain weight during preop process. Pt encouraged to lose weight via healthy eating and exercise. Pt may follow Liver Shrinking diet 2-4 weeks or more  prior to DOS depending on BMI at time. This diet will promote weight loss.      [place]- St. Jeor Equation:    FWF=[id number]  Weight Maintenance 2500  Estimated calories for weight loss 4458-9715 ( 1-2# per wk wt loss - sedentary )  Estimated protein needs 66-99 grams (1.0-1.5 gms/kg IBW )   Fluids:  132  oz total ( Linda-Segar method )   Fluid Requirement Calculator  Free Fluid  105  oz free fluid ([place]-Segar method; -20%)    NAFLD Fibrosis Score: -2.28 at 5/16/2025  8:22 AM  Calculated from:  SGOT/AST: 17 U/L at 5/16/2025  8:22 AM  SGPT/ALT: 26 U/L at 5/16/2025  8:22 AM  Serum Albumin: 4.1 g/dL at 5/16/2025  8:22 AM  Platelets: 367 Thousands/uL at 5/16/2025  8:22 AM  Age: 35 years  BMI: 52.0 at 5/14/2025  1:37 PM  Weight (recorded): 136.35 kg at 5/14/2025  1:37 PM  Height: 161.90 cm at 4/25/2025 10:35 AM  Has Diabetes: No      Weight History Reason for WLS: Considering for several years - not able to lose Tired and want to be a better me.  Onset of Obesity: Childhood  Family history of obesity: Yes  Wt Loss Attempts: Counseling with  MD  Exercise  Self Created Diets (Portion Control, Healthy Food Choices, etc.)  Patient has " tried the above for 6 months or more with insufficient weight loss or weight regain, which is why patient has requested to be evaluated for weight loss surgery today  Maximum Wt Lost: 20#      Review of History and Medications   OTC: B Complex, Folate, B12, Biotin, MagOxide, CoEnzyme Q  Past Medical History:   Diagnosis Date    Anemia     Anxiety     Asthma     Depression     Endometriosis     Fractures 2018     RIGHT TIB/FIB, RIBS , GREAT TOE     Hyperlipidemia     Hypertension     Only slightly elevated    Irregular menses     Migraines     Obesity     PCOS (polycystic ovarian syndrome)     Vitamin D deficiency      Past Surgical History:   Procedure Laterality Date    HYSTERECTOMY      KNEE ARTHROSCOPY      ORIF TIBIA & FIBULA FRACTURES Right     plate and screws    GA LAPS TOTAL HYSTERECT 250 GM/< W/RMVL TUBE/OVARY N/A 01/18/2022    Procedure: HYSTERECTOMY LAPAROSCOPIC TOTAL (LTH) W/ ROBOTICS, BILATERAL SALPINGECTOMY;  Surgeon: Aracely Carlos MD;  Location: [facility];  Service: Gynecology    WISDOM TOOTH EXTRACTION       Social History     Socioeconomic History    Marital status: Single   Occupational History    Occupation: Asst Manager   Tobacco Use    Smoking status: Never     Passive exposure: Never    Smokeless tobacco: Never   Vaping Use    Vaping status: Former    Substances: THC, CBD   Substance and Sexual Activity    Alcohol use: Yes     Comment: Drink very infrequently-maybe once a month    Drug use: Yes     Frequency: 3.0 times per week     Types: Marijuana     Comment: Use tintures for migraines    Sexual activity: Not Currently     Partners: Male     Birth control/protection: None   Social History Narrative    Single    Employed - Full Time       Current Outpatient Medications:     amitriptyline (ELAVIL) 50 mg tablet, Take 1 tablet (50 mg total) by mouth daily at bedtime, Disp: 90 tablet, Rfl: 3    B COMPLEX, FOLIC ACID, PO, One tablet daily, Disp: , Rfl:     Biotin 77651 MCG TABS, , Disp: ,  Rfl:     butalbital-acetaminophen-caffeine (Esgic) -40 mg per tablet, Take 1 tablet by mouth every 4 (four) hours as needed for headaches, Disp: 30 tablet, Rfl: 1    Coenzyme Q10 (Co Q-10) 200 MG CAPS, , Disp: , Rfl:     Cyanocobalamin (Vitamin B 12) 500 MCG TABS, One daily, Disp: , Rfl:     Magnesium Oxide -Mg Supplement ([employer] Magnesium) 500 MG TABS, , Disp: , Rfl:     metFORMIN (GLUCOPHAGE) 500 mg tablet, Take 1 tablet (500 mg total) by mouth 2 (two) times a day with meals, Disp: 180 tablet, Rfl: 1    rimegepant sulfate (Nurtec) 75 mg TBDP, Place one NURTEC 75 mg under tongue at onset of headche. Limit 1 in 24 hours., Disp: 8 tablet, Rfl: 3    topiramate (Topamax) 50 MG tablet, Take 1 tablet (50 mg total) by mouth daily at bedtime, Disp: 90 tablet, Rfl: 3  Food Intake and Lifestyle Assessment   Food Intake Assessment completed via usual diet recall  Breakfast: String cheeses  Snack: Grazes   Dinner: Chix or steak Starch, vegetable - will eat something quick - not a good cook per pt  Snack: Cheese, spinach with low carb dressing   Portions:  6oz Protein  1 c Starch   1/4 c Vegetable    Beverage intake: water 50 oz jug ( 1.5-2 daily)   No coffee occ milk or iced tea  Protein supplement: No  Estimated protein intake per day: 70-75grams  Estimated fluid intake per day:  oz   Meals eaten away from home: Dines or takes out   Typical meal pattern: 1-2 meals per day and several snacks per day  Eating Behaviors: Consumption of high calorie/ high fat foods, Large portion sizes, Frequent snacking/ grazing, Mindless eating, and Emotional eating (improved)  Food allergies or intolerances: Unsure - has stomach issues  Allergies   Allergen Reactions    Penicillins Anaphylaxis    Sulfamethoxazole-Trimethoprim Rash     Cultural or Anabaptism considerations: None    Physical Assessment  Physical Activity  On feet at work   Types of exercise: Walking or swims in summer - otherwise no formal   Current physical limitations:  "None    Psychosocial Assessment   Support systems: parent(s) sister lives nearby with her nieces and nephew, Friends  Socioeconomic factors: Works at [employer] -   Has Degree in English and Fine Arts    Nutrition Diagnosis  Diagnosis: Overweight / Obesity (NC-3.3)  Related to: Physical inactivity and Excessive energy intake  As Evidenced by: BMI >25     Nutrition Prescription: Recommend the following diet  Regular    Interventions and Teaching   Discussed pre-op and post-op nutrition guidelines.       Patient educated and handouts provided.  Surgical changes to stomach / GI  Capacity of post-surgery stomach  Diet progression  Adequate hydration  Sugar and fat restriction to decrease \"dumping syndrome\"  Fat restriction to decrease steatorrhea  Expected weight loss  Weight loss plateaus/ possibility of weight regain  Exercise  Suggestions for pre-op diet  Nutrition considerations after surgery  Protein supplements  Meal planning and preparation  Appropriate carbohydrate, protein, and fat intake, and food/fluid choices to maximize safe weight loss, nutrient intake, and tolerance   Dietary and lifestyle changes  Possible problems with poor eating habits  Intuitive eating  Techniques for self monitoring and keeping daily food journal  Potential for food intolerance after surgery, and ways to deal with them including: lactose intolerance, nausea, reflux, vomiting, diarrhea, food intolerance, appetite changes, gas  Vitamin / Mineral supplementation of Multivitamin with minerals, Calcium, Vitamin B12, Iron, Fat Soluble vitamins, and Vitamin D    Patient is not currently pregnant and doesn't desire to become pregnant a minimum of one year post-op    Education provided to: patient    Barriers to learning: No barriers identified    Readiness to change: preparation    Prior research on procedure: internet, discussed with provider, and friends or family    Comprehension: verbalizes understanding     Expected Compliance: " good  Recommendations  Pt is an appropriate candidate for surgery. Yes    Evaluation / Monitoring  Dietitian to Monitor: Eating pattern as discussed Tolerance of nutrition prescription Body weight Lab values Physical activity Bowel pattern    4/4 Weight Check Visit Summary 6/6/2025  Near completion of workflow.  Reviewed her bloodwork. Reading manual and making changes. Eating 3 meals - making better choices and reduced portions. Trying 30/60 rule - notes challenge. . Started journal noting weight, food intake, mood, etc. Migraines better managed- seen neuro yesterday. Questions/concerns addressed. Pt reports that she has a job interview next week if insurance would change if she is offered the position. Advised to provide that information and will adjust her workflow if needed to accommodate any changes with a new insurance. Pt receptive    Workflow reviewed:  Psych and/or D+A Clearance: N/A  PCP Letter: Referral in her chart  Support Group: No longer required, strongly encouraged  Surgeon Appt: 3/12/25  EGD:  5/23/25  Cardiac Risk Assessment: scheduled 6/6/25  Sleep Studies: home sleep study scheduled for 4/22- dx mild TERESA, ordered CPAP 4/27, but hasn't received yet- has follow up 6/5 and now physician to but in order for the CPAP - TERESA mild  - and to f/u in August   Blood work: Completed  5/16/2025  Nicotine test: N/A  Weight loss meds: N/A  Required weight checks: 4 months required  Weight Loss: Not required, encouraged positive lifestyle changes.  Goals  Eliminate sugar sweetened beverages, Food journal, Exercise 30 minutes 5 times per week, Complete lession plans 1-6, Eat 3 meals per day, and Eliminate mindless snacking  Follow Pre-Surgery guidelines  > Trial Baritastic for food logging  > Establish regular meal pattern - include fruits, vegetables and whole grains  > Avoid skipping meals- Can use protein drink as meal replacement  > Decrease portions  > Focus on protein - include lean protein at each meal and  snack - Learn to eat protein first  > Limit processed foodsand dining away from home  > Include meal prepping  > Limit snacks - healthier choices and portion; avoid grazing  > Slow pace of eating and sip fluids  - practice 30/60 minute rule  >Continue to avoid  caffeine and carbonation  > Maintain  water intake > 64 oz  > Increase physical activity and exercise as able  > Continue current vitamin regimen - may need to add Vitamin D Work on skills to cope with emotional eating/mindfull eating  Pre-op weight loss not required but advised not to gain weight  Glycemic Control: HgA1c 5.5 on 7/12/2024  Other Vitamin D Insufficiency 7/12/2024 - will recheck  F/U next month with bariatric provider      Time Spent:   30 minutes

## 2025-06-05 NOTE — ASSESSMENT & PLAN NOTE
Andreia is a very pleasant 35-year-old woman with a PMHx of migraine, HTN, PCOS, ADAM, obesity, HLD who presents in follow up for TERESA (NEPTALI 8.2, O2 dot 86% 4/2025).    Discussed with patient the pathophysiology of OSAS and medical conditions associated with OSAS such as DM, HTN, CAD, Depression, Stroke, Headache.  Reviewed sleep study results at length with the patient today  Treatment options including surgery, Dental appliances, positional therapy, and CPAP were discussed.  Will start AutoPAP 5-20 cmH2O with a nasal mask..   Advised the patient that she should be receiving a call to schedule with their DME in ~2 weeks to receive the device.  Reviewed CMS/insurance criteria and resupply guidelines  Advised patient to avoid activities that could harm self or others when tired/sleepy, including driving.  Discussed importance of good sleep hygiene.    Orders:    Cpap DME

## 2025-06-06 ENCOUNTER — CONSULT (OUTPATIENT)
Dept: CARDIOLOGY CLINIC | Facility: CLINIC | Age: 35
End: 2025-06-06
Payer: COMMERCIAL

## 2025-06-06 ENCOUNTER — CLINICAL SUPPORT (OUTPATIENT)
Dept: BARIATRICS | Facility: CLINIC | Age: 35
End: 2025-06-06

## 2025-06-06 VITALS
WEIGHT: 293 LBS | SYSTOLIC BLOOD PRESSURE: 120 MMHG | HEIGHT: 63 IN | DIASTOLIC BLOOD PRESSURE: 84 MMHG | HEART RATE: 95 BPM | BODY MASS INDEX: 51.91 KG/M2

## 2025-06-06 DIAGNOSIS — Z01.810 PRE-OPERATIVE CARDIOVASCULAR EXAMINATION: Primary | ICD-10-CM

## 2025-06-06 DIAGNOSIS — G47.33 OSA (OBSTRUCTIVE SLEEP APNEA): ICD-10-CM

## 2025-06-06 DIAGNOSIS — E66.813 CLASS 3 SEVERE OBESITY DUE TO EXCESS CALORIES WITHOUT SERIOUS COMORBIDITY WITH BODY MASS INDEX (BMI) OF 50.0 TO 59.9 IN ADULT: ICD-10-CM

## 2025-06-06 DIAGNOSIS — E78.2 COMBINED HYPERLIPIDEMIA: ICD-10-CM

## 2025-06-06 DIAGNOSIS — E66.01 MORBID (SEVERE) OBESITY DUE TO EXCESS CALORIES (HCC): Primary | ICD-10-CM

## 2025-06-06 PROCEDURE — RECHECK

## 2025-06-06 PROCEDURE — 93000 ELECTROCARDIOGRAM COMPLETE: CPT | Performed by: INTERNAL MEDICINE

## 2025-06-06 PROCEDURE — 99204 OFFICE O/P NEW MOD 45 MIN: CPT | Performed by: INTERNAL MEDICINE

## 2025-06-06 NOTE — PROGRESS NOTES
Patient ID: Andreia Lee is a 35 y.o. female.        Plan:      Assessment & Plan  Pre-operative cardiovascular examination  - According to the revised cardiac risk index patient is intermediate risk for planned operative procedure.  Patient fully understands and is accepting of these risks and wishes to proceed with gastric bypass surgery as scheduled.  In that setting along with adequate functional status with no significant anginal symptoms patient is appropriate risk to proceed with surgery.  TERESA (obstructive sleep apnea)  - Counseled patient on dietary and lifestyle modifications  -Continue to follow with sleep medicine  Class 3 severe obesity due to excess calories without serious comorbidity with body mass index (BMI) of 50.0 to 59.9 in adult  - Counseled patient on dietary lifestyle modifications along with need for weight reduction  -Continue to monitor  -Continue to follow with weight management team  Combined hyperlipidemia  - Counseled patient on dietary and lifestyle modifications  - Continue to monitor      Follow up Plan/Other summary comments:  -Lipid panel 5/16/2025 showing total cholesterol 235, triglyceride 278, HDL 40,   - Counseled patient on dietary lifestyle modifications including following a low-salt, low-fat, heart healthy diet with sodium restriction of less than 18 mg sodium daily, DASH diet, NSAID avoidance, need for weight reduction goal BMI less than 29  -Patient can be seen on an as-needed basis or sooner if necessary.  - Patient counseled if she were to have any warning or alarm type symptoms she is to seek emergency medical care immediately.    HPI:   - Patient is a 35-year-old female with documented obstructive sleep apnea, hypertension, PCOS, hyperlipidemia, obesity who presents to the office today for perioperative risk stratification prior to bariatric surgery.  - Currently in the office today patient denies any chest pain, palpitation, lightheadedness dizziness,  "loss of consciousness, shortness of breath or lower extremity edema, orthopnea or bendopnea.  Patient notes that while she does not exercise on a regular basis she is very active at work walking well over 3 miles at a time with no chest pain or anginal symptoms.  She states that despite the arthropathies she has she can easily walk 4 city blocks on flat surface or up 2 flights of stairs with no chest pain or anginal symptoms.  -Patient denies any history of issue with anesthesia, history of MI, stroke, vascular disease, insulin-dependent diabetes mellitus, or chronic kidney disease or heart failure.  - Patient denies any tobacco or illicit drug use and notes occasional social alcohol use and very infrequent medical marijuana use.  - Patient denies any known family history of heart disease    Most recent or relevant cardiac/vascular testing:    - ECG performed in the office today shows sinus rhythm heart rate 95 bpm      Past Surgical History[1]        Review of Systems   Review of Systems   Constitutional:  Negative for chills, diaphoresis, fatigue and fever.   HENT:  Negative for trouble swallowing and voice change.    Eyes:  Negative for pain and redness.   Respiratory:  Negative for shortness of breath and wheezing.    Cardiovascular:  Negative for chest pain, palpitations and leg swelling.   Gastrointestinal:  Negative for abdominal pain, constipation, diarrhea, nausea and vomiting.   Genitourinary:  Negative for dysuria.   Musculoskeletal:  Positive for arthralgias. Negative for neck pain and neck stiffness.   Neurological:  Negative for dizziness, syncope, light-headedness and headaches.   Psychiatric/Behavioral:  Negative for agitation and confusion.    All other systems reviewed and are negative.         Objective:     /84   Pulse 95   Ht 5' 3\" (1.6 m)   Wt 136 kg (299 lb)   LMP 12/14/2021 (Approximate)   BMI 52.97 kg/m²     PHYSICAL EXAM:  Physical Exam  Vitals reviewed.   Constitutional:       " General: She is not in acute distress.     Appearance: She is obese. She is not diaphoretic.   HENT:      Head: Normocephalic and atraumatic.     Eyes:      General:         Right eye: No discharge.         Left eye: No discharge.     Neck:      Comments: Trachea midline, neck obese, difficult to assess JVD  Cardiovascular:      Rate and Rhythm: Normal rate and regular rhythm.      Heart sounds:      No friction rub.   Pulmonary:      Effort: Pulmonary effort is normal. No respiratory distress.      Breath sounds: No wheezing.   Chest:      Chest wall: No tenderness.   Abdominal:      General: There is no distension.      Palpations: Abdomen is soft.      Tenderness: There is no abdominal tenderness. There is no rebound.     Musculoskeletal:      Right lower leg: No edema.      Left lower leg: No edema.     Skin:     General: Skin is warm and dry.     Neurological:      Mental Status: She is alert.      Comments: Awake, alert, able to answer questions appropriately, able to move extremities bilaterally.   Psychiatric:         Mood and Affect: Mood normal.         Behavior: Behavior normal.          Meds reviewed.  Medications Ordered Prior to Encounter[2]   Past Medical History[3]        Tobacco Use History[4]    Family History[5]               [1]   Past Surgical History:  Procedure Laterality Date    HYSTERECTOMY      KNEE ARTHROSCOPY      ORIF TIBIA & FIBULA FRACTURES Right     plate and screws    VA LAPS TOTAL HYSTERECT 250 GM/< W/RMVL TUBE/OVARY N/A 01/18/2022    Procedure: HYSTERECTOMY LAPAROSCOPIC TOTAL (LTH) W/ ROBOTICS, BILATERAL SALPINGECTOMY;  Surgeon: Aracely Carlos MD;  Location: [facility];  Service: Gynecology    WISDOM TOOTH EXTRACTION     [2]   Current Outpatient Medications on File Prior to Visit   Medication Sig Dispense Refill    amitriptyline (ELAVIL) 50 mg tablet Take 1 tablet (50 mg total) by mouth daily at bedtime 90 tablet 3    B COMPLEX, FOLIC ACID, PO One tablet daily      Biotin  88009 MCG TABS       butalbital-acetaminophen-caffeine (Esgic) -40 mg per tablet Take 1 tablet by mouth every 4 (four) hours as needed for headaches 30 tablet 1    Coenzyme Q10 (Co Q-10) 200 MG CAPS       Cyanocobalamin (Vitamin B 12) 500 MCG TABS One daily      Magnesium Oxide -Mg Supplement ([employer] Magnesium) 500 MG TABS       metFORMIN (GLUCOPHAGE) 500 mg tablet Take 1 tablet (500 mg total) by mouth 2 (two) times a day with meals 180 tablet 1    rimegepant sulfate (Nurtec) 75 mg TBDP Place one NURTEC 75 mg under tongue at onset of headche. Limit 1 in 24 hours. 8 tablet 3    topiramate (Topamax) 50 MG tablet Take 1 tablet (50 mg total) by mouth daily at bedtime 90 tablet 3     No current facility-administered medications on file prior to visit.   [3]   Past Medical History:  Diagnosis Date    Anemia     Anxiety     Asthma     Depression     Endometriosis     Fractures 2018     RIGHT TIB/FIB, RIBS , GREAT TOE     Hyperlipidemia     Hypertension     Only slightly elevated    Irregular menses     Migraines     Obesity     PCOS (polycystic ovarian syndrome)     Vitamin D deficiency    [4]   Social History  Tobacco Use   Smoking Status Never    Passive exposure: Never   Smokeless Tobacco Never   [5]   Family History  Problem Relation Name Age of Onset    Hyperlipidemia Mother Dion-L     No Known Problems Father      Breast cancer Paternal Grandmother Tiffanie     No Known Problems Sister      No Known Problems Maternal Grandmother      No Known Problems Maternal Grandfather      No Known Problems Paternal Grandfather      No Known Problems Maternal Aunt      No Known Problems Paternal Aunt      No Known Problems Paternal Aunt

## 2025-06-06 NOTE — ASSESSMENT & PLAN NOTE
- Counseled patient on dietary and lifestyle modifications  -Continue to follow with sleep medicine

## 2025-06-06 NOTE — ASSESSMENT & PLAN NOTE
- Counseled patient on dietary lifestyle modifications along with need for weight reduction  -Continue to monitor  -Continue to follow with weight management team

## 2025-06-25 ENCOUNTER — TELEPHONE (OUTPATIENT)
Age: 35
End: 2025-06-25

## 2025-06-25 ENCOUNTER — OFFICE VISIT (OUTPATIENT)
Dept: INTERNAL MEDICINE CLINIC | Facility: CLINIC | Age: 35
End: 2025-06-25
Payer: COMMERCIAL

## 2025-06-25 ENCOUNTER — APPOINTMENT (OUTPATIENT)
Dept: URGENT CARE | Facility: CLINIC | Age: 35
End: 2025-06-25

## 2025-06-25 VITALS
HEIGHT: 63 IN | WEIGHT: 293 LBS | BODY MASS INDEX: 51.91 KG/M2 | SYSTOLIC BLOOD PRESSURE: 128 MMHG | OXYGEN SATURATION: 98 % | HEART RATE: 84 BPM | DIASTOLIC BLOOD PRESSURE: 80 MMHG

## 2025-06-25 DIAGNOSIS — Z00.00 ANNUAL PHYSICAL EXAM: Primary | ICD-10-CM

## 2025-06-25 DIAGNOSIS — Z23 ENCOUNTER FOR IMMUNIZATION: ICD-10-CM

## 2025-06-25 PROBLEM — R06.83 SNORING: Status: RESOLVED | Noted: 2025-04-27 | Resolved: 2025-06-25

## 2025-06-25 PROBLEM — R22.31 LUMP OF AXILLA, RIGHT: Status: RESOLVED | Noted: 2022-04-19 | Resolved: 2025-06-25

## 2025-06-25 PROBLEM — E66.813 CLASS 3 SEVERE OBESITY WITH BODY MASS INDEX (BMI) OF 50.0 TO 59.9 IN ADULT, UNSPECIFIED OBESITY TYPE, UNSPECIFIED WHETHER SERIOUS COMORBIDITY PRESENT: Status: RESOLVED | Noted: 2025-04-27 | Resolved: 2025-06-25

## 2025-06-25 PROBLEM — B35.4 TINEA CORPORIS: Status: RESOLVED | Noted: 2022-07-28 | Resolved: 2025-06-25

## 2025-06-25 PROCEDURE — 99395 PREV VISIT EST AGE 18-39: CPT | Performed by: PHYSICIAN ASSISTANT

## 2025-06-25 NOTE — PROGRESS NOTES
Adult Annual Physical  Name: Andreia Lee      : 1990      MRN: [id number]  Encounter Provider: Elsi Beckman PA-C  Encounter Date: 2025   Encounter department: [facility]    :  Assessment & Plan  Encounter for immunization         Annual physical exam             Preventive Screenings:  - Diabetes Screening: screening up-to-date  - Cholesterol Screening: screening not indicated and has hyperlipidemia   - Hepatitis C screening: screening up-to-date   - HIV screening: screening up-to-date   - Cervical cancer screening: screening not indicated   - Colon cancer screening: screening not indicated   - Lung cancer screening: screening not indicated     Immunizations:  - Immunizations due: Prevnar 20 and Tdap      Depression Screening and Follow-up Plan: Patient was screened for depression during today's encounter. They screened negative with a PHQ-2 score of 0.          History of Present Illness     Adult Annual Physical:  Patient presents for annual physical.     Diet and Physical Activity:  - Diet/Nutrition: portion control and low carb diet.  - Exercise: no formal exercise. I walk 2-4 miles a day while at work    Depression Screening:  - PHQ-2 Score: 0    General Health:  - Sleep: 4-6 hours of sleep on average.  - Hearing: normal hearing bilateral ears.  - Vision: no vision problems.  - Dental: no dental visits for > 1 year, brushes teeth once daily and floss regularly.    /GYN Health:  - Follows with GYN: yes.   - History of STDs: no  - Contraception: hysterectomy.      Advanced Care Planning:  - Has an advanced directive?: no    - Has a durable medical POA?: no    - ACP document given to patient?: no      Review of Systems   Constitutional:  Negative for chills and fever.   HENT:  Negative for congestion, ear pain, hearing loss, postnasal drip, rhinorrhea, sinus pressure, sinus pain, sore throat and trouble swallowing.    Eyes:  Negative for pain and visual disturbance.  "  Respiratory:  Negative for cough, chest tightness, shortness of breath and wheezing.    Cardiovascular: Negative.  Negative for chest pain, palpitations and leg swelling.   Gastrointestinal:  Negative for abdominal pain, blood in stool, constipation, diarrhea, nausea and vomiting.   Endocrine: Negative for cold intolerance, heat intolerance, polydipsia, polyphagia and polyuria.   Genitourinary:  Negative for difficulty urinating, dysuria, flank pain and urgency.   Musculoskeletal:  Negative for arthralgias, back pain, gait problem and myalgias.   Skin:  Negative for rash.   Allergic/Immunologic: Negative.    Neurological:  Negative for dizziness, weakness, light-headedness and headaches.   Hematological: Negative.    Psychiatric/Behavioral:  Negative for behavioral problems, dysphoric mood and sleep disturbance. The patient is not nervous/anxious.          Objective   /80   Pulse 84   Ht 5' 3\" (1.6 m)   Wt 134 kg (295 lb)   LMP 12/14/2021 (Approximate)   SpO2 98%   BMI 52.26 kg/m²     Physical Exam  Vitals and nursing note reviewed.   Constitutional:       General: She is not in acute distress.     Appearance: Normal appearance. She is well-developed. She is not diaphoretic.   HENT:      Head: Normocephalic and atraumatic.      Right Ear: External ear normal.      Left Ear: External ear normal.      Nose: Nose normal.      Mouth/Throat:      Pharynx: No oropharyngeal exudate.     Eyes:      General: No scleral icterus.        Right eye: No discharge.         Left eye: No discharge.      Conjunctiva/sclera: Conjunctivae normal.      Pupils: Pupils are equal, round, and reactive to light.     Neck:      Thyroid: No thyromegaly.     Cardiovascular:      Rate and Rhythm: Normal rate and regular rhythm.      Heart sounds: Normal heart sounds. No murmur heard.     No friction rub. No gallop.   Pulmonary:      Effort: Pulmonary effort is normal. No respiratory distress.      Breath sounds: Normal breath " sounds. No wheezing or rales.   Abdominal:      General: Bowel sounds are normal. There is no distension.      Palpations: Abdomen is soft.      Tenderness: There is no abdominal tenderness.     Musculoskeletal:         General: No tenderness or deformity. Normal range of motion.      Cervical back: Normal range of motion and neck supple.     Skin:     General: Skin is warm and dry.     Neurological:      Mental Status: She is alert and oriented to person, place, and time.      Cranial Nerves: No cranial nerve deficit.     Psychiatric:         Behavior: Behavior normal.         Thought Content: Thought content normal.         Judgment: Judgment normal.

## 2025-06-25 NOTE — TELEPHONE ENCOUNTER
Patient will be in between insurances at the time of appointment with Maureen. Patient is asking if she should cancel it, or how to proceed. Please advise.

## 2025-06-25 NOTE — PATIENT INSTRUCTIONS
"Patient Education     Routine physical for adults   The Basics   Written by the doctors and editors at [facility]   What is a physical? -- A physical is a routine visit, or \"check-up,\" with your doctor. You might also hear it called a \"wellness visit\" or \"preventive visit.\"  During each visit, the doctor will:   Ask about your physical and mental health   Ask about your habits, behaviors, and lifestyle   Do an exam   Give you vaccines if needed   Talk to you about any medicines you take   Give advice about your health   Answer your questions  Getting regular check-ups is an important part of taking care of your health. It can help your doctor find and treat any problems you have. But it's also important for preventing health problems.  A routine physical is different from a \"sick visit.\" A sick visit is when you see a doctor because of a health concern or problem. Since physicals are scheduled ahead of time, you can think about what you want to ask the doctor.  How often should I get a physical? -- It depends on your age and health. In general, for people age 21 years and older:   If you are younger than 50 years, you might be able to get a physical every 3 years.   If you are 50 years or older, your doctor might recommend a physical every year.  If you have an ongoing health condition, like diabetes or high blood pressure, your doctor will probably want to see you more often.  What happens during a physical? -- In general, each visit will include:   Physical exam - The doctor or nurse will check your height, weight, heart rate, and blood pressure. They will also look at your eyes and ears. They will ask about how you are feeling and whether you have any symptoms that bother you.   Medicines - It's a good idea to bring a list of all the medicines you take to each doctor visit. Your doctor will talk to you about your medicines and answer any questions. Tell them if you are having any side effects that bother you. You " "should also tell them if you are having trouble paying for any of your medicines.   Habits and behaviors - This includes:   Your diet   Your exercise habits   Whether you smoke, drink alcohol, or use drugs   Whether you are sexually active   Whether you feel safe at home  Your doctor will talk to you about things you can do to improve your health and lower your risk of health problems. They will also offer help and support. For example, if you want to quit smoking, they can give you advice and might prescribe medicines. If you want to improve your diet or get more physical activity, they can help you with this, too.   Lab tests, if needed - The tests you get will depend on your age and situation. For example, your doctor might want to check your:   Cholesterol   Blood sugar   Iron level   Vaccines - The recommended vaccines will depend on your age, health, and what vaccines you already had. Vaccines are very important because they can prevent certain serious or deadly infections.   Discussion of screening - \"Screening\" means checking for diseases or other health problems before they cause symptoms. Your doctor can recommend screening based on your age, risk, and preferences. This might include tests to check for:   Cancer, such as breast, prostate, cervical, ovarian, colorectal, prostate, lung, or skin cancer   Sexually transmitted infections, such as chlamydia and gonorrhea   Mental health conditions like depression and anxiety  Your doctor will talk to you about the different types of screening tests. They can help you decide which screenings to have. They can also explain what the results might mean.   Answering questions - The physical is a good time to ask the doctor or nurse questions about your health. If needed, they can refer you to other doctors or specialists, too.  Adults older than 65 years often need other care, too. As you get older, your doctor will talk to you about:   How to prevent falling at " home   Hearing or vision tests   Memory testing   How to take your medicines safely   Making sure that you have the help and support you need at home  All topics are updated as new evidence becomes available and our peer review process is complete.  This topic retrieved from [employer] [facility] on: May 02, 2024.  Topic 441098 Version 1.0  Release: 32.4.3 - C32.122  © 2024 UpToDate, Inc. and/or its affiliates. All rights reserved.  Consumer Information Use and Disclaimer   Disclaimer: This generalized information is a limited summary of diagnosis, treatment, and/or medication information. It is not meant to be comprehensive and should be used as a tool to help the user understand and/or assess potential diagnostic and treatment options. It does NOT include all information about conditions, treatments, medications, side effects, or risks that may apply to a specific patient. It is not intended to be medical advice or a substitute for the medical advice, diagnosis, or treatment of a health care provider based on the health care provider's examination and assessment of a patient's specific and unique circumstances. Patients must speak with a health care provider for complete information about their health, medical questions, and treatment options, including any risks or benefits regarding use of medications. This information does not endorse any treatments or medications as safe, effective, or approved for treating a specific patient. UpToDate, Inc. and its affiliates disclaim any warranty or liability relating to this information or the use thereof.The use of this information is governed by the Terms of Use, available at https://www.woltersStyleShareuwer.com/en/know/clinical-effectiveness-terms. 2024© UpToDate, Inc. and its affiliates and/or licensors. All rights reserved.  Copyright   © 2024 UpToDate, Inc. and/or its affiliates. All rights reserved.

## 2025-07-03 ENCOUNTER — TELEPHONE (OUTPATIENT)
Dept: SLEEP CENTER | Facility: CLINIC | Age: 35
End: 2025-07-03

## 2025-07-03 LAB
DME PARACHUTE DELIVERY DATE REQUESTED: NORMAL
DME PARACHUTE ITEM DESCRIPTION: NORMAL
DME PARACHUTE ORDER STATUS: NORMAL
DME PARACHUTE SUPPLIER NAME: NORMAL
DME PARACHUTE SUPPLIER PHONE: NORMAL

## 2025-07-03 NOTE — TELEPHONE ENCOUNTER
Per Robbin message:  After our last appointment I haven’t heard anything about my getting my cpap machine and would like to get that taken care of because I will be switching jobs soon as well as insurances.   -------------------------------    Rx for CPAP provided at 6/5/2025 office visit.    Call placed to patient.  Advised of above.    Patient amenable to using [employer] as DME supplier.  Added to the [employer] DME list for order to be processed with request to expedite.    Patient scheduled for follow-up with Dr. Bustamante 8/7/2025.  Unable to re-schedule that appointment to meet compliance, at this time.  Patient will re-schedule appropriate compliance (8/17/25-10/1/25).

## 2025-07-03 NOTE — TELEPHONE ENCOUNTER
Rx for APAP and clinicals sent to [facility] via [place]    Expedited

## 2025-07-07 ENCOUNTER — CLINICAL SUPPORT (OUTPATIENT)
Dept: BARIATRICS | Facility: CLINIC | Age: 35
End: 2025-07-07

## 2025-07-07 DIAGNOSIS — E66.01 MORBID (SEVERE) OBESITY DUE TO EXCESS CALORIES (HCC): Primary | ICD-10-CM

## 2025-07-07 PROCEDURE — RECHECK

## 2025-07-07 NOTE — PROGRESS NOTES
"  Bariatric Nutrition Assessment- PreOp Note    Insurance: 4 required monthly weight checks 4/4    Type of surgery    Pt interested in the Laparoscopic gastric bypass possible sleeve gastrectomy.   Surgery Date: TBD  Surgeon: Amy Surgeons: Dr. Wing  - Consult on 3/12/2025    Nutrition Assessment   Andreia Lee  35 y.o.  female     LMP 12/14/2021 (Approximate)   Current Weight: 299.2#   Initial Weight at Surgeon Consult: 310#   BMI: 53.6  Height: 5'3.8\"  Eval Weight: 310.2#   BMI: 53.7  Wt with BMI of 25: 145#  Pre-Op Excess Wt: 165#  BMI to Qualify at 34 = 202.5#  PMH includes:  Obesity. PCOS (med), HTN, TERESA mild    Pt advised not to gain weight during preop process. Pt encouraged to lose weight via healthy eating and exercise. Pt may follow Liver Shrinking diet 2-4 weeks or more  prior to DOS depending on BMI at time. This diet will promote weight loss.      [place]- St. Jeor Equation:    [id number]  Weight Maintenance 2500  Estimated calories for weight loss 4789-3379 ( 1-2# per wk wt loss - sedentary )  Estimated protein needs 66-99 grams (1.0-1.5 gms/kg IBW )   Fluids:  132  oz total ( Linda-Segar method )   Fluid Requirement Calculator  Free Fluid  105  oz free fluid ([place]-Segar method; -20%)    NAFLD Fibrosis Score: -2.28 at 5/16/2025  8:22 AM  Calculated from:  SGOT/AST: 17 U/L at 5/16/2025  8:22 AM  SGPT/ALT: 26 U/L at 5/16/2025  8:22 AM  Serum Albumin: 4.1 g/dL at 5/16/2025  8:22 AM  Platelets: 367 Thousands/uL at 5/16/2025  8:22 AM  Age: 35 years  BMI: 52.0 at 5/14/2025  1:37 PM  Weight (recorded): 136.35 kg at 5/14/2025  1:37 PM  Height: 161.90 cm at 4/25/2025 10:35 AM  Has Diabetes: No      Weight History Reason for WLS: Considering for several years - not able to lose Tired and want to be a better me.  Onset of Obesity: Childhood  Family history of obesity: Yes  Wt Loss Attempts: Counseling with  MD  Exercise  Self Created Diets (Portion Control, Healthy Food Choices, etc.)  Patient has " tried the above for 6 months or more with insufficient weight loss or weight regain, which is why patient has requested to be evaluated for weight loss surgery today  Maximum Wt Lost: 20#      Review of History and Medications   OTC: B Complex, Folate, B12, Biotin, MagOxide, CoEnzyme Q  Past Medical History:   Diagnosis Date    Anemia     Anxiety     Asthma     Depression     Endometriosis     Fractures 2018     RIGHT TIB/FIB, RIBS , GREAT TOE     Hyperlipidemia     Hypertension     Only slightly elevated    Irregular menses     Migraines     Obesity     PCOS (polycystic ovarian syndrome)     Vitamin D deficiency      Past Surgical History:   Procedure Laterality Date    HYSTERECTOMY      KNEE ARTHROSCOPY      ORIF TIBIA & FIBULA FRACTURES Right     plate and screws    IA LAPS TOTAL HYSTERECT 250 GM/< W/RMVL TUBE/OVARY N/A 01/18/2022    Procedure: HYSTERECTOMY LAPAROSCOPIC TOTAL (LTH) W/ ROBOTICS, BILATERAL SALPINGECTOMY;  Surgeon: Aracely Carlos MD;  Location: [facility];  Service: Gynecology    WISDOM TOOTH EXTRACTION       Social History     Socioeconomic History    Marital status: Single   Occupational History    Occupation: Asst Manager   Tobacco Use    Smoking status: Never     Passive exposure: Never    Smokeless tobacco: Never   Vaping Use    Vaping status: Former    Substances: THC, CBD   Substance and Sexual Activity    Alcohol use: Yes     Comment: Drink very infrequently-maybe once a month    Drug use: Yes     Frequency: 3.0 times per week     Types: Marijuana     Comment: Use tintures for migraines    Sexual activity: Not Currently     Partners: Male     Birth control/protection: None   Social History Narrative    Single    Employed - Full Time     Social Drivers of Health     Food Insecurity: No Food Insecurity (6/25/2025)    Nursing - Inadequate Food Risk Classification     Worried About Running Out of Food in the Last Year: Never true     Ran Out of Food in the Last Year: Never true    Transportation Needs: No Transportation Needs (6/25/2025)    PRAPARE - Transportation     Lack of Transportation (Medical): No     Lack of Transportation (Non-Medical): No   Housing Stability: Low Risk  (6/25/2025)    Housing Stability Vital Sign     Unable to Pay for Housing in the Last Year: No     Number of Times Moved in the Last Year: 0     Homeless in the Last Year: No       Current Outpatient Medications:     amitriptyline (ELAVIL) 50 mg tablet, Take 1 tablet (50 mg total) by mouth daily at bedtime, Disp: 90 tablet, Rfl: 3    B COMPLEX, FOLIC ACID, PO, One tablet daily, Disp: , Rfl:     Biotin 61669 MCG TABS, , Disp: , Rfl:     butalbital-acetaminophen-caffeine (Esgic) -40 mg per tablet, Take 1 tablet by mouth every 4 (four) hours as needed for headaches, Disp: 30 tablet, Rfl: 1    Coenzyme Q10 (Co Q-10) 200 MG CAPS, , Disp: , Rfl:     Cyanocobalamin (Vitamin B 12) 500 MCG TABS, One daily, Disp: , Rfl:     Magnesium Oxide -Mg Supplement ([employer] Magnesium) 500 MG TABS, , Disp: , Rfl:     metFORMIN (GLUCOPHAGE) 500 mg tablet, Take 1 tablet (500 mg total) by mouth 2 (two) times a day with meals, Disp: 180 tablet, Rfl: 1    rimegepant sulfate (Nurtec) 75 mg TBDP, Place one NURTEC 75 mg under tongue at onset of headche. Limit 1 in 24 hours., Disp: 8 tablet, Rfl: 3    topiramate (Topamax) 50 MG tablet, Take 1 tablet (50 mg total) by mouth daily at bedtime, Disp: 90 tablet, Rfl: 3  Food Intake and Lifestyle Assessment   Food Intake Assessment completed via usual diet recall  Breakfast: String cheeses  Snack: Grazes   Dinner: Chix or steak Starch, vegetable - will eat something quick - not a good cook per pt  Snack: Cheese, spinach with low carb dressing   Portions:  6oz Protein  1 c Starch   1/4 c Vegetable    Beverage intake: water 50 oz jug ( 1.5-2 daily)   No coffee occ milk or iced tea  Protein supplement: No  Estimated protein intake per day: 70-75grams  Estimated fluid intake per day:  oz   Meals  "eaten away from home: Dines or takes out   Typical meal pattern: 1-2 meals per day and several snacks per day  Eating Behaviors: Consumption of high calorie/ high fat foods, Large portion sizes, Frequent snacking/ grazing, Mindless eating, and Emotional eating (improved)  Food allergies or intolerances: Unsure - has stomach issues  Allergies   Allergen Reactions    Penicillins Anaphylaxis    Sulfamethoxazole-Trimethoprim Rash     Cultural or Druze considerations: None    Physical Assessment  Physical Activity  On feet at work   Types of exercise: Walking or swims in summer - otherwise no formal   Current physical limitations: None    Psychosocial Assessment   Support systems: parent(s) sister lives nearby with her nieces and nephew, Friends  Socioeconomic factors: Works at [employer] -   Has Degree in English and Fine Arts    Nutrition Diagnosis  Diagnosis: Overweight / Obesity (NC-3.3)  Related to: Physical inactivity and Excessive energy intake  As Evidenced by: BMI >25     Nutrition Prescription: Recommend the following diet  Regular    Interventions and Teaching   Discussed pre-op and post-op nutrition guidelines.       Patient educated and handouts provided.  Surgical changes to stomach / GI  Capacity of post-surgery stomach  Diet progression  Adequate hydration  Sugar and fat restriction to decrease \"dumping syndrome\"  Fat restriction to decrease steatorrhea  Expected weight loss  Weight loss plateaus/ possibility of weight regain  Exercise  Suggestions for pre-op diet  Nutrition considerations after surgery  Protein supplements  Meal planning and preparation  Appropriate carbohydrate, protein, and fat intake, and food/fluid choices to maximize safe weight loss, nutrient intake, and tolerance   Dietary and lifestyle changes  Possible problems with poor eating habits  Intuitive eating  Techniques for self monitoring and keeping daily food journal  Potential for food intolerance after surgery, and " ways to deal with them including: lactose intolerance, nausea, reflux, vomiting, diarrhea, food intolerance, appetite changes, gas  Vitamin / Mineral supplementation of Multivitamin with minerals, Calcium, Vitamin B12, Iron, Fat Soluble vitamins, and Vitamin D    Patient is not currently pregnant and doesn't desire to become pregnant a minimum of one year post-op    Education provided to: patient    Barriers to learning: No barriers identified    Readiness to change: preparation    Prior research on procedure: internet, discussed with provider, and friends or family    Comprehension: verbalizes understanding     Expected Compliance: good  Recommendations  Pt is an appropriate candidate for surgery. Yes    Evaluation / Monitoring  Dietitian to Monitor: Eating pattern as discussed Tolerance of nutrition prescription Body weight Lab values Physical activity Bowel pattern    4/4 Weight Check Visit Summary 6/6/2025  Near completion of workflow.  Reviewed her bloodwork. Reading manual and making changes. Eating 3 meals - making better choices and reduced portions. Trying 30/60 rule - notes challenge. . Started journal noting weight, food intake, mood, etc. Migraines better managed- seen neuro yesterday. Questions/concerns addressed. Pt reports that she has a job interview next week if insurance would change if she is offered the position. Advised to provide that information and will adjust her workflow if needed to accommodate any changes with a new insurance. Pt receptive      4/4 Weight Check Visit Summary 7/7/2025  Pt completed workflow though need to delay surgery as accepted new job so will not have insurance for 90 days. Will stay in process. Pt emotional today as stressors with family. Support provided.  Having stomach issues r/t to physical and stress. Saw neuro and migraines better. Pt expects new job will alllow for better exercise regimen as able to get into routine . ( Work hours steady 8-4) Will also be able  to pack meals and snacks for work and able to take consistent breaks. Has also incorporated protein bars and protein drinks  to her diet. Questions answered during discussion. Pt to continue monthly visits until obtains new insurance. Will complete updates to workflow as needed  Workflow reviewed:  Psych and/or D+A Clearance: N/A  PCP Letter: Referral in her chart  Support Group: No longer required, strongly encouraged  Surgeon Appt: 3/12/25  EGD:  5/23/25  Cardiac Risk Assessment:  6/6/25 completed   Sleep Studies: home sleep study scheduled for 4/22- dx mild TERESA, ordered CPAP 4/27, but hasn't received yet- has follow up 6/5 and now physician to but in order for the CPAP - TERESA mild  - and to f/u in August   Blood work: Completed  5/16/2025  Nicotine test: N/A  Weight loss meds: N/A  Required weight checks: 4 months required  Weight Loss: Not required, encouraged positive lifestyle changes.  Goals  Eliminate sugar sweetened beverages, Food journal, Exercise 30 minutes 5 times per week, Complete lession plans 1-6, Eat 3 meals per day, and Eliminate mindless snacking  Follow Pre-Surgery guidelines  > Trial Baritastic for food logging  > Establish regular meal pattern - include fruits, vegetables and whole grains  > Avoid skipping meals- Can use protein drink as meal replacement  > Decrease portions  > Focus on protein - include lean protein at each meal and snack - Learn to eat protein first  > Limit processed foodsand dining away from home  > Include meal prepping  > Limit snacks - healthier choices and portion; avoid grazing  > Slow pace of eating and sip fluids  - practice 30/60 minute rule  >Continue to avoid  caffeine and carbonation  > Maintain  water intake > 64 oz  > Increase physical activity and exercise as able  > Continue current vitamin regimen - may need to add Vitamin D Work on skills to cope with emotional eating/mindfull eating  Pre-op weight loss not required but advised not to gain weight  Glycemic  Control: HgA1c 5.5 on 7/12/2024  Other Vitamin D Insufficiency 7/12/2024 - will recheck  F/U next month with bariatric provider      Time Spent:   30 minutes

## 2025-07-09 LAB

## 2025-07-18 LAB

## (undated) DEVICE — VISUALIZATION SYSTEM: Brand: CLEARIFY

## (undated) DEVICE — OCCLUDER COLPO-PNEUMO

## (undated) DEVICE — LARGE NEEDLE DRIVER: Brand: ENDOWRIST

## (undated) DEVICE — 1820 FOAM BLOCK NEEDLE COUNTER: Brand: DEVON

## (undated) DEVICE — TIP COVER ACCESSORY

## (undated) DEVICE — UTERINE MANIPULATOR RUMI 6.7 X 10 CM

## (undated) DEVICE — SUT STRATAFIX SPIRAL 2-0 CT-1 30 CM SXPP1B410

## (undated) DEVICE — TROCAR PORT ACCESS 12 X120MM W/BLDLS OPTICAL TIP AIRSEAL

## (undated) DEVICE — COLUMN DRAPE

## (undated) DEVICE — LUBRICANT INST ELECTROLUBE ANTISTK WO PAD

## (undated) DEVICE — TRAY FOLEY 16FR URIMETER SILICONE SURESTEP

## (undated) DEVICE — SUT MONOCRYL 4-0 PS-2 27 IN Y426H

## (undated) DEVICE — PROGRASP FORCEPS: Brand: ENDOWRIST

## (undated) DEVICE — ENDOPATH PNEUMONEEDLE INSUFFLATION NEEDLES WITH LUER LOCK CONNECTORS 120MM: Brand: ENDOPATH

## (undated) DEVICE — SYRINGE 50ML LL

## (undated) DEVICE — INTENDED FOR TISSUE SEPARATION, AND OTHER PROCEDURES THAT REQUIRE A SHARP SURGICAL BLADE TO PUNCTURE OR CUT.: Brand: BARD-PARKER SAFETY BLADES SIZE 11, STERILE

## (undated) DEVICE — GLOVE INDICATOR PI UNDERGLOVE SZ 7 BLUE

## (undated) DEVICE — 40595 XL TRENDELENBURG POSITIONING KIT: Brand: 40595 XL TRENDELENBURG POSITIONING KIT

## (undated) DEVICE — GLOVE INDICATOR PI UNDERGLOVE SZ 6.5 BLUE

## (undated) DEVICE — RETRACT ENDO ENDORETRACT 36CM 10MM STR HNDL LF

## (undated) DEVICE — CLAMP TOWEL TUBING DISPOSABLE

## (undated) DEVICE — PREMIUM DRY TRAY LF: Brand: MEDLINE INDUSTRIES, INC.

## (undated) DEVICE — TROCAR PORT ACCESS 5 X120MML W/BLDLS OPTICAL TIP AIRSEAL

## (undated) DEVICE — SYNCHROSEAL: Brand: DA VINCI ENERGY

## (undated) DEVICE — BLADELESS OBTURATOR: Brand: WECK VISTA

## (undated) DEVICE — KIT, BETHLEHEM ROBOTIC PROST: Brand: CARDINAL HEALTH

## (undated) DEVICE — NEEDLE 25G X 1 1/2

## (undated) DEVICE — ADHESIVE SKIN HIGH VISCOSITY EXOFIN 1ML

## (undated) DEVICE — CANNULA SEAL

## (undated) DEVICE — SUT VICRYL 0 UR-6 27 IN J603H

## (undated) DEVICE — MAYO STAND COVER: Brand: CONVERTORS

## (undated) DEVICE — MONOPOLAR CURVED SCISSORS: Brand: ENDOWRIST

## (undated) DEVICE — CHLORHEXIDINE 4PCT 4 OZ

## (undated) DEVICE — AIRSEAL TUBE SMOKE EVAC LUMENX3 FILTERED

## (undated) DEVICE — GLOVE PI ULTRA TOUCH SZ.6.5

## (undated) DEVICE — ARM DRAPE

## (undated) DEVICE — CHLORAPREP HI-LITE 26ML ORANGE